# Patient Record
Sex: MALE | Race: BLACK OR AFRICAN AMERICAN | NOT HISPANIC OR LATINO | Employment: OTHER | URBAN - METROPOLITAN AREA
[De-identification: names, ages, dates, MRNs, and addresses within clinical notes are randomized per-mention and may not be internally consistent; named-entity substitution may affect disease eponyms.]

---

## 2019-10-23 ENCOUNTER — HOSPITAL ENCOUNTER (EMERGENCY)
Facility: HOSPITAL | Age: 64
Discharge: HOME | End: 2019-10-23
Attending: EMERGENCY MEDICINE
Payer: MEDICARE

## 2019-10-23 VITALS
BODY MASS INDEX: 21.34 KG/M2 | HEIGHT: 64 IN | OXYGEN SATURATION: 99 % | HEART RATE: 104 BPM | RESPIRATION RATE: 21 BRPM | SYSTOLIC BLOOD PRESSURE: 150 MMHG | WEIGHT: 125 LBS | DIASTOLIC BLOOD PRESSURE: 89 MMHG | TEMPERATURE: 99 F

## 2019-10-23 DIAGNOSIS — R40.4 TRANSIENT ALTERATION OF AWARENESS: ICD-10-CM

## 2019-10-23 DIAGNOSIS — R40.4 ALTERED CONSCIOUSNESS: Primary | ICD-10-CM

## 2019-10-23 LAB
ALBUMIN SERPL BCP-MCNC: 4.1 G/DL (ref 3.5–5.2)
ALBUMIN SERPL BCP-MCNC: 4.1 G/DL (ref 3.5–5.2)
ALP SERPL-CCNC: 95 U/L (ref 55–135)
ALP SERPL-CCNC: 95 U/L (ref 55–135)
ALT SERPL W/O P-5'-P-CCNC: 31 U/L (ref 10–44)
ALT SERPL W/O P-5'-P-CCNC: 31 U/L (ref 10–44)
ANION GAP SERPL CALC-SCNC: 12 MMOL/L (ref 8–16)
ANION GAP SERPL CALC-SCNC: 12 MMOL/L (ref 8–16)
AST SERPL-CCNC: 26 U/L (ref 10–40)
AST SERPL-CCNC: 26 U/L (ref 10–40)
BASOPHILS # BLD AUTO: 0.04 K/UL (ref 0–0.2)
BASOPHILS # BLD AUTO: 0.04 K/UL (ref 0–0.2)
BASOPHILS NFR BLD: 0.4 % (ref 0–1.9)
BASOPHILS NFR BLD: 0.4 % (ref 0–1.9)
BILIRUB SERPL-MCNC: 0.3 MG/DL (ref 0.1–1)
BILIRUB SERPL-MCNC: 0.3 MG/DL (ref 0.1–1)
BILIRUB UR QL STRIP: NEGATIVE
BUN SERPL-MCNC: 12 MG/DL (ref 8–23)
BUN SERPL-MCNC: 12 MG/DL (ref 8–23)
CALCIUM SERPL-MCNC: 10.2 MG/DL (ref 8.7–10.5)
CALCIUM SERPL-MCNC: 10.2 MG/DL (ref 8.7–10.5)
CHLORIDE SERPL-SCNC: 101 MMOL/L (ref 95–110)
CHLORIDE SERPL-SCNC: 101 MMOL/L (ref 95–110)
CHOLEST SERPL-MCNC: 151 MG/DL (ref 120–199)
CHOLEST SERPL-MCNC: 151 MG/DL (ref 120–199)
CHOLEST/HDLC SERPL: 2.3 {RATIO} (ref 2–5)
CHOLEST/HDLC SERPL: 2.3 {RATIO} (ref 2–5)
CLARITY UR: CLEAR
CO2 SERPL-SCNC: 26 MMOL/L (ref 23–29)
CO2 SERPL-SCNC: 26 MMOL/L (ref 23–29)
COLOR UR: YELLOW
CREAT SERPL-MCNC: 1.1 MG/DL (ref 0.5–1.4)
CREAT SERPL-MCNC: 1.2 MG/DL (ref 0.5–1.4)
CREAT SERPL-MCNC: 1.2 MG/DL (ref 0.5–1.4)
DIFFERENTIAL METHOD: ABNORMAL
DIFFERENTIAL METHOD: ABNORMAL
EOSINOPHIL # BLD AUTO: 0.2 K/UL (ref 0–0.5)
EOSINOPHIL # BLD AUTO: 0.2 K/UL (ref 0–0.5)
EOSINOPHIL NFR BLD: 2 % (ref 0–8)
EOSINOPHIL NFR BLD: 2 % (ref 0–8)
ERYTHROCYTE [DISTWIDTH] IN BLOOD BY AUTOMATED COUNT: 14.6 % (ref 11.5–14.5)
ERYTHROCYTE [DISTWIDTH] IN BLOOD BY AUTOMATED COUNT: 14.6 % (ref 11.5–14.5)
EST. GFR  (AFRICAN AMERICAN): >60 ML/MIN/1.73 M^2
EST. GFR  (AFRICAN AMERICAN): >60 ML/MIN/1.73 M^2
EST. GFR  (NON AFRICAN AMERICAN): >60 ML/MIN/1.73 M^2
EST. GFR  (NON AFRICAN AMERICAN): >60 ML/MIN/1.73 M^2
GLUCOSE SERPL-MCNC: 159 MG/DL (ref 70–110)
GLUCOSE SERPL-MCNC: 159 MG/DL (ref 70–110)
GLUCOSE UR QL STRIP: NEGATIVE
HCT VFR BLD AUTO: 40.4 % (ref 40–54)
HCT VFR BLD AUTO: 40.4 % (ref 40–54)
HDLC SERPL-MCNC: 65 MG/DL (ref 40–75)
HDLC SERPL-MCNC: 65 MG/DL (ref 40–75)
HDLC SERPL: 43 % (ref 20–50)
HDLC SERPL: 43 % (ref 20–50)
HGB BLD-MCNC: 12.7 G/DL (ref 14–18)
HGB BLD-MCNC: 12.7 G/DL (ref 14–18)
HGB UR QL STRIP: NEGATIVE
INR PPP: 1 (ref 0.8–1.2)
INR PPP: 1 (ref 0.8–1.2)
KETONES UR QL STRIP: NEGATIVE
LDLC SERPL CALC-MCNC: 74.8 MG/DL (ref 63–159)
LDLC SERPL CALC-MCNC: 74.8 MG/DL (ref 63–159)
LEUKOCYTE ESTERASE UR QL STRIP: NEGATIVE
LYMPHOCYTES # BLD AUTO: 3.2 K/UL (ref 1–4.8)
LYMPHOCYTES # BLD AUTO: 3.2 K/UL (ref 1–4.8)
LYMPHOCYTES NFR BLD: 30 % (ref 18–48)
LYMPHOCYTES NFR BLD: 30 % (ref 18–48)
MCH RBC QN AUTO: 31 PG (ref 27–31)
MCH RBC QN AUTO: 31 PG (ref 27–31)
MCHC RBC AUTO-ENTMCNC: 31.4 G/DL (ref 32–36)
MCHC RBC AUTO-ENTMCNC: 31.4 G/DL (ref 32–36)
MCV RBC AUTO: 99 FL (ref 82–98)
MCV RBC AUTO: 99 FL (ref 82–98)
MONOCYTES # BLD AUTO: 0.8 K/UL (ref 0.3–1)
MONOCYTES # BLD AUTO: 0.8 K/UL (ref 0.3–1)
MONOCYTES NFR BLD: 7 % (ref 4–15)
MONOCYTES NFR BLD: 7 % (ref 4–15)
NEUTROPHILS # BLD AUTO: 6.5 K/UL (ref 1.8–7.7)
NEUTROPHILS # BLD AUTO: 6.5 K/UL (ref 1.8–7.7)
NEUTROPHILS NFR BLD: 60.6 % (ref 38–73)
NEUTROPHILS NFR BLD: 60.6 % (ref 38–73)
NITRITE UR QL STRIP: NEGATIVE
NONHDLC SERPL-MCNC: 86 MG/DL
NONHDLC SERPL-MCNC: 86 MG/DL
PH UR STRIP: 6 [PH] (ref 5–8)
PLATELET # BLD AUTO: 236 K/UL (ref 150–350)
PLATELET # BLD AUTO: 236 K/UL (ref 150–350)
PMV BLD AUTO: 9.6 FL (ref 9.2–12.9)
PMV BLD AUTO: 9.6 FL (ref 9.2–12.9)
POC PTINR: 1 (ref 0.9–1.2)
POC PTWBT: 12.3 SEC (ref 9.7–14.3)
POCT GLUCOSE: 145 MG/DL (ref 70–110)
POTASSIUM SERPL-SCNC: 3.6 MMOL/L (ref 3.5–5.1)
POTASSIUM SERPL-SCNC: 3.6 MMOL/L (ref 3.5–5.1)
PROT SERPL-MCNC: 7.5 G/DL (ref 6–8.4)
PROT SERPL-MCNC: 7.5 G/DL (ref 6–8.4)
PROT UR QL STRIP: NEGATIVE
PROTHROMBIN TIME: 10.3 SEC (ref 9–12.5)
PROTHROMBIN TIME: 10.3 SEC (ref 9–12.5)
RBC # BLD AUTO: 4.1 M/UL (ref 4.6–6.2)
RBC # BLD AUTO: 4.1 M/UL (ref 4.6–6.2)
SAMPLE: NORMAL
SAMPLE: NORMAL
SODIUM SERPL-SCNC: 139 MMOL/L (ref 136–145)
SODIUM SERPL-SCNC: 139 MMOL/L (ref 136–145)
SP GR UR STRIP: 1.01 (ref 1–1.03)
TRIGL SERPL-MCNC: 56 MG/DL (ref 30–150)
TRIGL SERPL-MCNC: 56 MG/DL (ref 30–150)
TROPONIN I SERPL DL<=0.01 NG/ML-MCNC: 0.01 NG/ML (ref 0–0.03)
TSH SERPL DL<=0.005 MIU/L-ACNC: 0.74 UIU/ML (ref 0.4–4)
TSH SERPL DL<=0.005 MIU/L-ACNC: 0.74 UIU/ML (ref 0.4–4)
URN SPEC COLLECT METH UR: NORMAL
UROBILINOGEN UR STRIP-ACNC: NEGATIVE EU/DL
WBC # BLD AUTO: 10.69 K/UL (ref 3.9–12.7)
WBC # BLD AUTO: 10.69 K/UL (ref 3.9–12.7)

## 2019-10-23 PROCEDURE — 80053 COMPREHEN METABOLIC PANEL: CPT

## 2019-10-23 PROCEDURE — 63600175 PHARM REV CODE 636 W HCPCS: Performed by: EMERGENCY MEDICINE

## 2019-10-23 PROCEDURE — 99285 EMERGENCY DEPT VISIT HI MDM: CPT | Mod: 25

## 2019-10-23 PROCEDURE — 93005 ELECTROCARDIOGRAM TRACING: CPT

## 2019-10-23 PROCEDURE — 99900035 HC TECH TIME PER 15 MIN (STAT)

## 2019-10-23 PROCEDURE — 93010 ELECTROCARDIOGRAM REPORT: CPT | Mod: ,,, | Performed by: INTERNAL MEDICINE

## 2019-10-23 PROCEDURE — 85610 PROTHROMBIN TIME: CPT | Mod: 91

## 2019-10-23 PROCEDURE — G0425 PR INPT TELEHEALTH CONSULT 30M: ICD-10-PCS | Mod: GT,,, | Performed by: PSYCHIATRY & NEUROLOGY

## 2019-10-23 PROCEDURE — 96360 HYDRATION IV INFUSION INIT: CPT | Mod: 59

## 2019-10-23 PROCEDURE — 82565 ASSAY OF CREATININE: CPT | Mod: 91

## 2019-10-23 PROCEDURE — 81003 URINALYSIS AUTO W/O SCOPE: CPT

## 2019-10-23 PROCEDURE — G0425 INPT/ED TELECONSULT30: HCPCS | Mod: GT,,, | Performed by: PSYCHIATRY & NEUROLOGY

## 2019-10-23 PROCEDURE — 96361 HYDRATE IV INFUSION ADD-ON: CPT

## 2019-10-23 PROCEDURE — 93010 EKG 12-LEAD: ICD-10-PCS | Mod: ,,, | Performed by: INTERNAL MEDICINE

## 2019-10-23 PROCEDURE — 96372 THER/PROPH/DIAG INJ SC/IM: CPT | Mod: 59

## 2019-10-23 PROCEDURE — 84484 ASSAY OF TROPONIN QUANT: CPT

## 2019-10-23 PROCEDURE — 85025 COMPLETE CBC W/AUTO DIFF WBC: CPT

## 2019-10-23 PROCEDURE — 25500020 PHARM REV CODE 255: Performed by: EMERGENCY MEDICINE

## 2019-10-23 PROCEDURE — 80061 LIPID PANEL: CPT

## 2019-10-23 PROCEDURE — 85610 PROTHROMBIN TIME: CPT

## 2019-10-23 PROCEDURE — 84443 ASSAY THYROID STIM HORMONE: CPT

## 2019-10-23 RX ORDER — LISINOPRIL 20 MG/1
20 TABLET ORAL DAILY
COMMUNITY
End: 2023-01-01 | Stop reason: ALTCHOICE

## 2019-10-23 RX ORDER — ATORVASTATIN CALCIUM 40 MG/1
40 TABLET, FILM COATED ORAL NIGHTLY
COMMUNITY
End: 2023-01-01 | Stop reason: ALTCHOICE

## 2019-10-23 RX ORDER — TRAZODONE HYDROCHLORIDE 100 MG/1
100 TABLET ORAL NIGHTLY
COMMUNITY
End: 2023-01-01 | Stop reason: ALTCHOICE

## 2019-10-23 RX ORDER — MULTIVITAMIN
1 TABLET ORAL DAILY
COMMUNITY

## 2019-10-23 RX ORDER — OLANZAPINE 10 MG/1
10 TABLET ORAL 2 TIMES DAILY
COMMUNITY
End: 2023-01-01 | Stop reason: ALTCHOICE

## 2019-10-23 RX ORDER — ZIPRASIDONE MESYLATE 20 MG/ML
10 INJECTION, POWDER, LYOPHILIZED, FOR SOLUTION INTRAMUSCULAR
Status: COMPLETED | OUTPATIENT
Start: 2019-10-23 | End: 2019-10-23

## 2019-10-23 RX ORDER — SODIUM CHLORIDE 9 MG/ML
1000 INJECTION, SOLUTION INTRAVENOUS
Status: COMPLETED | OUTPATIENT
Start: 2019-10-23 | End: 2019-10-23

## 2019-10-23 RX ORDER — ZIPRASIDONE MESYLATE 20 MG/ML
10 INJECTION, POWDER, LYOPHILIZED, FOR SOLUTION INTRAMUSCULAR EVERY 12 HOURS PRN
COMMUNITY
End: 2023-01-01 | Stop reason: ALTCHOICE

## 2019-10-23 RX ORDER — BUSPIRONE HYDROCHLORIDE 5 MG/1
10 TABLET ORAL 3 TIMES DAILY
COMMUNITY
End: 2023-01-01 | Stop reason: ALTCHOICE

## 2019-10-23 RX ADMIN — ZIPRASIDONE MESYLATE 10 MG: 20 INJECTION, POWDER, LYOPHILIZED, FOR SOLUTION INTRAMUSCULAR at 10:10

## 2019-10-23 RX ADMIN — SODIUM CHLORIDE 1000 ML: 0.9 INJECTION, SOLUTION INTRAVENOUS at 11:10

## 2019-10-23 RX ADMIN — IOHEXOL 100 ML: 350 INJECTION, SOLUTION INTRAVENOUS at 11:10

## 2019-10-23 NOTE — ED NOTES
Pt is awake, alert, speaking, answering questing somewhat appropriately, and asking for food. Dr. Lefort informed.

## 2019-10-23 NOTE — ED NOTES
Spoke with nurse Radha from [facility]. Reports pt ambulates independently and does not require WC transportation. This RN ambulated with pt. Pt ambulated independently with no assistance and steady gait.

## 2019-10-23 NOTE — CONSULTS
[facility]  Vascular Neurology  Comprehensive Stroke Center  Tele-Consultation Note      Consults    Consulting Provider: LEFORT, GUY J.  Current Providers  No providers found    Patient Location:  [facility] EMERGENCY DEPARTMENT Emergency Department  Spoke hospital nurse at bedside with patient assisting consultant.     Patient information was obtained from relative(s), EMS personnel and ED staff.         Assessment/Plan:   63 y/o with HTN, HLD, PTSD, schizophrenia, brought in due altered mental status and L face droop.  NIHSS 26, CTH without acute abnormality.  Suspect encephalopathy, thus will not treat with iv alteplase.  Recommend STAT CTA head and neck.  Admit. MRI brain. Neurology consult.          STROKE DOCUMENTATION     Acute Stroke Times:   Acute Stroke Times   Last Known Normal Date: 10/23/19  Last Known Normal Time: 0800  Symptom Onset Date: 10/23/19  Symptom Onset Time: 0800  Stroke Team Called Date: 10/23/19  Stroke Team Called Time: 1003  Stroke Team Arrival Date: 10/23/19  Stroke Team Arrival Time: 1006  CT Interpretation Time: 1006  Decision to Treat Time for Alteplase: (No IV alteplase)  Decision to Treat Time for IR: (No IR candidate)    NIH Scale:  Interval: baseline  1a. Level of Consciousness: 0-->Alert, keenly responsive  1b. LOC Questions: 2-->Answers neither question correctly  1c. LOC Commands: 2-->Performs neither task correctly  2. Best Gaze: 0-->Normal  3. Visual: 0-->No visual loss  4. Facial Palsy: 1-->Minor paralysis (flattened nasolabial fold, asymmetry on smiling)  5a. Motor Arm, Left: 4-->No movement  5b. Motor Arm, Right: 4-->No movement  6a. Motor Leg, Left: 4-->No movement  6b. Motor Leg, Right: 4-->No movement  7. Limb Ataxia: 0-->Absent  8. Sensory: 0-->Normal, no sensory loss  9. Best Language: 3-->Mute, global aphasia, no usable speech or auditory comprehension  10. Dysarthria: 2-->Severe dysarthria, patients speech is so slurred as to be  "unintelligible in the absence of or out of proportion to any dysphasia, or is mute/anarthric  11. Extinction and Inattention (formerly Neglect): 0-->No abnormality  Total (NIH Stroke Scale): 26     Modified Isabela Score: 1  [place] Coma Scale:3   ABCD2 Score:    [id number] Score:   HAS -BLED Score:   ICH Score:   Hunt & Santiago Classification:       Diagnoses: altered mental status.  No new Assessment & Plan notes have been filed under this hospital service since the last note was generated.  Service: Vascular Neurology      Temperature 98.4 °F (36.9 °C), temperature source Oral, height 5' 4" (1.626 m), weight 56.7 kg (125 lb).  Alteplase Eligible?: No  Alteplase Recommendation: Alteplase not recommended due to Suspected stroke mimic   Possible Interventional Revascularization Candidate? suspected encephalopathy    Disposition Recommendation: admit to inpatient    Subjective:     History of Present Illness: 65 y/o with HTN, HLD, PTSD, schizophrenia, brought in due altered mental status and L face droop.      No notes on file      Woke up with symptoms?: no    Recent bleeding noted: unknown  Does the patient take any Blood Thinners? no  Medications: Antiplatelets:  none and Anticoagulants:  none      Past Medical History: hypertension, hyperlipidemia and PTSD, schizophrenia    Past Surgical History: unable to obtain    Family History: no relevant history    Social History: unable to obtain    Allergies: No Known Allergies No known drug allergies    Review of Systems   Unable to perform ROS: Patient unresponsive     Objective:   Vitals: Height 5' 4" (1.626 m), weight 56.7 kg (125 lb). BP: 191/86, Respiratory Rate: 17 and Heart Rate: 78    CT READ: Yes  No hemmorhage. No mass effect. No early infarct signs.     Physical Exam   Constitutional: He appears well-developed and well-nourished.   HENT:   Head: Normocephalic and atraumatic.   Eyes: Pupils are equal, round, and reactive to light. EOM are normal.   Neck: Normal " range of motion. Neck supple.   Cardiovascular: Regular rhythm.   Pulmonary/Chest: No respiratory distress.   Abdominal: He exhibits no mass.   Genitourinary:   Genitourinary Comments: No performed   Musculoskeletal: He exhibits no edema or deformity.   Neurological: A cranial nerve deficit is present.   Open eyes, unresponsive, no spontaneous movements   Skin: No rash noted. He is not diaphoretic. No erythema.   Psychiatric:   Unable to test     Nursing note and vitals reviewed.            Recommended the emergency room physician to have a brief discussion with the patient and/or family if available regarding the risks and benefits of treatment, and to briefly document the occurrence of that discussion in his clinical encounter note.     The attending portion of this evaluation, treatment, and documentation was performed per Power Juárez MD via audiovisual.    Billing code:  (non-intervention mild to moderate stroke, TIA, some mimics)    · This patient has a critical neurological condition/illness, with some potential for high morbidity and mortality.  · There is a moderate probability for acute neurological change leading to clinical and possibly life-threatening deterioration requiring highest level of physician preparedness for urgent intervention.  · Care was coordinated with other physicians involved in the patient's care.  · Radiologic studies and laboratory data were reviewed and interpreted, and plan of care was re-assessed based on the results.  · Diagnosis, treatment options and prognosis may have been discussed with the patient and/or family members or caregiver.      In your opinion, this was a: Tier 1 N/A    Consult End Time: 1022 am    Power Juárez MD  Comprehensive Stroke Center  Vascular Neurology   [facility]

## 2019-10-23 NOTE — ED NOTES
Pt removing cardiac monitor and blood pressure cuff. Spoke with nurse Radha from [facility] pt did not receive daily medication prior to departure of facility. Dr. Lefort informed. Orders to be placed.

## 2019-10-23 NOTE — SUBJECTIVE & OBJECTIVE
"  Woke up with symptoms?: no    Recent bleeding noted: unknown  Does the patient take any Blood Thinners? no  Medications: Antiplatelets:  none and Anticoagulants:  none      Past Medical History: hypertension, hyperlipidemia and PTSD, schizophrenia    Past Surgical History: unable to obtain    Family History: no relevant history    Social History: unable to obtain    Allergies: No Known Allergies No known drug allergies    Review of Systems   Unable to perform ROS: Patient unresponsive     Objective:   Vitals: Height 5' 4" (1.626 m), weight 56.7 kg (125 lb). BP: 191/86, Respiratory Rate: 17 and Heart Rate: 78    CT READ: Yes  No hemmorhage. No mass effect. No early infarct signs.     Physical Exam   Constitutional: He appears well-developed and well-nourished.   HENT:   Head: Normocephalic and atraumatic.   Eyes: Pupils are equal, round, and reactive to light. EOM are normal.   Neck: Normal range of motion. Neck supple.   Cardiovascular: Regular rhythm.   Pulmonary/Chest: No respiratory distress.   Abdominal: He exhibits no mass.   Genitourinary:   Genitourinary Comments: No performed   Musculoskeletal: He exhibits no edema or deformity.   Neurological: A cranial nerve deficit is present.   Open eyes, unresponsive, no spontaneous movements   Skin: No rash noted. He is not diaphoretic. No erythema.   Psychiatric:   Unable to test     Nursing note and vitals reviewed.        "

## 2019-10-23 NOTE — ED NOTES
SPD arrived for transportation. Informed pt is CEC'd and a flight risk. Security x 2 at bedside. Pt escorted in WC to secured vehicle.

## 2019-10-23 NOTE — ED PROVIDER NOTES
Encounter Date: 10/23/2019    SCRIBE #1 NOTE: I, Christina Noguera , am scribing for, and in the presence of,  Dr. Lefort . I have scribed the entire note.       History     Chief Complaint   Patient presents with    Facial Droop     LKW 0930. Acute AMS with L side facial droop. Sent from [facility] with PEC in place.      Time seen by provider: 9: 53 AM    This is a 64 y.o. male who presents with complaint of left-sided facial droop that started PTA this morning. EMS reports the pt is currently PEC'd at [facility]. Per care staff, pt was last known well at 9:30 AM this morning. Staff notes the pt exhibits sudden onset confusion.    The history is provided by the EMS personnel. The history is limited by the condition of the patient (pt is unreponsive ).     Review of patient's allergies indicates:  No Known Allergies  Past Medical History:   Diagnosis Date    Hyperlipidemia     Hypertension     PTSD (post-traumatic stress disorder)     Schizophrenia      No past surgical history on file.  No family history on file.  Social History     Tobacco Use    Smoking status: Current Every Day Smoker   Substance Use Topics    Alcohol use: Not Currently     Frequency: Never    Drug use: Not Currently     Review of Systems   Unable to perform ROS: Patient unresponsive       Physical Exam     Initial Vitals   BP Pulse Resp Temp SpO2   10/23/19 1013 10/23/19 1013 10/23/19 1013 10/23/19 1020 10/23/19 1016   (!) 191/86 (!) 130 (!) 24 98.4 °F (36.9 °C) 100 %      MAP       --                Physical Exam    Nursing note and vitals reviewed.  Constitutional: He appears well-developed and well-nourished. He is not diaphoretic. No distress.   Largely non-verbal    HENT:   Head: Normocephalic and atraumatic.   Nose: Nose normal.   Eyes: Conjunctivae and EOM are normal.   Neck: Normal range of motion. Neck supple.   Cardiovascular: Regular rhythm.   No murmur heard.  Tachycardic   Pulmonary/Chest: Breath sounds normal. No  respiratory distress. He has no wheezes.   Abdominal: Soft. He exhibits no distension. There is no tenderness.   Musculoskeletal:   Moves all extremities without difficulty   Neurological: A cranial nerve deficit is present.   subtle left facial weakness when smiling   Skin: Skin is warm and dry. No rash noted.         ED Course   Critical Care  Date/Time: 10/23/2019 10:43 AM  Performed by: Guy J. Lefort, MD  Authorized by: Guy J. Lefort, MD   Total critical care time (exclusive of procedural time) : 30 minutes  Critical care was necessary to treat or prevent imminent or life-threatening deterioration of the following conditions: CNS failure or compromise.  Critical care was time spent personally by me on the following activities: development of treatment plan with patient or surrogate, discussions with consultants, interpretation of cardiac output measurements, evaluation of patient's response to treatment, examination of patient, obtaining history from patient or surrogate, ordering and performing treatments and interventions, ordering and review of laboratory studies, ordering and review of radiographic studies, review of old charts and re-evaluation of patient's condition.        Labs Reviewed   CBC W/ AUTO DIFFERENTIAL - Abnormal; Notable for the following components:       Result Value    RBC 4.10 (*)     Hemoglobin 12.7 (*)     Mean Corpuscular Volume 99 (*)     Mean Corpuscular Hemoglobin Conc 31.4 (*)     RDW 14.6 (*)     All other components within normal limits   COMPREHENSIVE METABOLIC PANEL - Abnormal; Notable for the following components:    Glucose 159 (*)     All other components within normal limits   CBC W/ AUTO DIFFERENTIAL - Abnormal; Notable for the following components:    RBC 4.10 (*)     Hemoglobin 12.7 (*)     Mean Corpuscular Volume 99 (*)     Mean Corpuscular Hemoglobin Conc 31.4 (*)     RDW 14.6 (*)     All other components within normal limits   COMPREHENSIVE METABOLIC PANEL - Abnormal;  Notable for the following components:    Glucose 159 (*)     All other components within normal limits   POCT GLUCOSE - Abnormal; Notable for the following components:    POCT Glucose 145 (*)     All other components within normal limits   PROTIME-INR   TSH   LIPID PANEL   PROTIME-INR   TSH   LIPID PANEL   TROPONIN I   URINALYSIS, REFLEX TO URINE CULTURE    Narrative:     Preferred Collection Type->Urine, Clean Catch   ISTAT PROCEDURE   ISTAT CREATININE     EKG Readings: (Independently Interpreted)   Sinus Tachycardia; rate of 130 bpm; no ectopy; no STEMI        Imaging Results          CTA Head and Neck (xpd) (Final result)  Result time 10/23/19 13:09:52   Procedure changed from CTA Brain     Final result by Gaurav Thomas MD (10/23/19 13:09:52)                 Impression:      Bilateral carotid bifurcation and proximal internal carotid artery soft and calcified atherosclerosis, more prominent on the left.    Otherwise, unremarkable MRA head and neck.    No acute intracranial abnormality.      Electronically signed by: Gaurav Thomas MD  Date:    10/23/2019  Time:    13:09             Narrative:    EXAMINATION:  CTA HEAD AND NECK (XPD)    CLINICAL HISTORY:  Stroke;    TECHNIQUE:  Non contrast low dose axial images were obtained through the head.  CT angiogram was performed from the level of the asiya to the top of the head following the IV administration of 100mL of Omnipaque 350.   Sagittal and coronal reconstructions and maximum intensity projection reconstructions were performed. Arterial stenosis percentages are based on NASCET measurement criteria.    COMPARISON:  CT head October 23, 2019    FINDINGS:  No intracranial hemorrhage or acute infarction.  No intracranial mass or mass effect.  No hydrocephalus.    No abnormal intracranial enhancement on the postcontrast images.    Limited images of the upper chest demonstrate bilateral centrilobular emphysema.    No acute bone abnormality.    CTA neck:    The  left carotid bifurcation and proximal left internal carotid artery demonstrate soft and calcified atherosclerosis with approximately 50% stenosis.    The right carotid bifurcation and proximal right internal carotid artery demonstrate soft and calcified atherosclerosis with less than 50% stenosis.    The bilateral vertebral arteries are patent without significant flow-limiting stenosis.    CTA head:    No significant flow-limiting stenosis in the intracranial arteries.  No intracranial aneurysm or vascular malformation.                               X-Ray Chest AP Portable (Final result)  Result time 10/23/19 10:53:45    Final result by Bjorn Suarez MD (10/23/19 10:53:45)                 Impression:      As above.      Electronically signed by: Bjorn Suarez  Date:    10/23/2019  Time:    10:53             Narrative:    EXAMINATION:  XR CHEST AP PORTABLE    CLINICAL HISTORY:  Stroke;    TECHNIQUE:  Single frontal view of the chest was performed.    COMPARISON:  None available    FINDINGS:  Increased pulmonary interstitial attenuation.  No lobar consolidation, pleural effusion, or pneumothorax.  Cardiomediastinal silhouette is within normal limits.  No acute osseous abnormality.  Metallic clips noted along the right neck.                               CT Head Without Contrast (Final result)  Result time 10/23/19 10:36:30    Final result by Bjorn Suarez MD (10/23/19 10:36:30)                 Impression:      No acute intracranial abnormality.    Prominent right subinsular CSF space versus remote lacune.      Electronically signed by: Bjorn Suarez  Date:    10/23/2019  Time:    10:36             Narrative:    EXAMINATION:  CT HEAD WITHOUT CONTRAST    CLINICAL HISTORY:  Focal neuro deficit, new, fixed or worsening, <6 hours;    TECHNIQUE:  Low dose axial CT images obtained throughout the head without intravenous contrast. Sagittal and coronal reconstructions were performed.    COMPARISON:  None  available    FINDINGS:  Hypoattenuating 6 mm right subinsular focus, possible remote lacune versus prominent CSF space (series 400, image 60).  Otherwise, gray-white differentiation appears maintained.  No midline shift, mass effect or extra-axial collection.  No abnormal ventricular enlargement.  Paranasal sinuses and bilateral mastoid air cells are clear.  No acute calvarial abnormality.                                 Medical Decision Making:   Differential Diagnosis:   Differential Diagnosis includes, but is not limited to:  CVA/TIA, intracranial mass/hemorrhage, head trauma, seizure, status epilepticus, post-ictal state, meningitis/encephalitis, sepsis, MI/ACS, arrhythmia, syncope,   anaphylaxis, thyroid disease, neuroleptic malignant syndrome, serotonin syndrome, CO poisoning, hypoxia/hypercapnea, uremic/hepatic encephalopathy, medication reaction, intentional overdose, metabolic derangement, psychiatric disturbance, substance abuse, alcohol intoxication/withdrawal, hypoglycemia/hyperglycemia, complicated migraine.    Independently Interpreted Test(s):   I have ordered and independently interpreted X-rays - see prior notes.  I have ordered and independently interpreted EKG Reading(s) - see prior notes  Clinical Tests:   Lab Tests: Ordered and Reviewed  Radiological Study: Ordered and Reviewed  Medical Tests: Ordered and Reviewed                   ED Course as of Oct 23 1347   Wed Oct 23, 2019   1010 Spoke to Dr. Simon (neurology) who states the patient is not a tPA candidate. He recommends CTA of the head and neck.     [AJ]   1333 Patient received CTA head and neck which was negative. Discussed with Dr. Kent who recommends PT/OT at [facility] and discharge of patient in stable condition.    [AJ]      ED Course User Index  [AJ] Christina Noguera     Clinical Impression:       ICD-10-CM ICD-9-CM   1. Altered consciousness R40.4 780.09   2. Transient alteration of awareness R40.4 780.02     Scribe Attestation I,  Dr. Guy Lefort, personally performed the services described in this documentation. All medical record entries made by the scribe were at my direction and in my presence. I have reviewed the chart and agree that the record reflects my personal performance and is accurate and complete. Guy Lefort, MD.  2:42 AM 10/25/2019      Disposition:   Disposition: Discharged  Condition: Stable                        Guy J. Lefort, MD  10/25/19 0243

## 2021-10-27 ENCOUNTER — HOSPITAL ENCOUNTER (OUTPATIENT)
Dept: MEDSURG UNIT | Facility: HOSPITAL | Age: 66
End: 2021-10-29
Attending: INTERNAL MEDICINE | Admitting: INTERNAL MEDICINE

## 2021-10-27 LAB
ABS NEUT (OLG): 6.66 X10(3)/MCL (ref 2.1–9.2)
ABS NEUT (OLG): 7.03 X10(3)/MCL (ref 2.1–9.2)
ALBUMIN SERPL-MCNC: 3.3 GM/DL (ref 3.4–4.8)
ALBUMIN SERPL-MCNC: 3.5 GM/DL (ref 3.4–4.8)
ALBUMIN/GLOB SERPL: 1 RATIO (ref 1.1–2)
ALBUMIN/GLOB SERPL: 1 RATIO (ref 1.1–2)
ALP SERPL-CCNC: 73 UNIT/L (ref 40–150)
ALP SERPL-CCNC: 83 UNIT/L (ref 40–150)
ALT SERPL-CCNC: 14 UNIT/L (ref 0–55)
ALT SERPL-CCNC: 16 UNIT/L (ref 0–55)
APPEARANCE, UA: CLEAR
AST SERPL-CCNC: 38 UNIT/L (ref 5–34)
AST SERPL-CCNC: 41 UNIT/L (ref 5–34)
BACTERIA #/AREA URNS AUTO: ABNORMAL /HPF
BASOPHILS # BLD AUTO: 0 X10(3)/MCL (ref 0–0.2)
BASOPHILS # BLD AUTO: 0.1 X10(3)/MCL (ref 0–0.2)
BASOPHILS NFR BLD AUTO: 0 %
BASOPHILS NFR BLD AUTO: 0 %
BILIRUB SERPL-MCNC: 0.3 MG/DL
BILIRUB SERPL-MCNC: 0.4 MG/DL
BILIRUB UR QL STRIP: NEGATIVE
BILIRUBIN DIRECT+TOT PNL SERPL-MCNC: 0.1 MG/DL (ref 0–0.8)
BILIRUBIN DIRECT+TOT PNL SERPL-MCNC: 0.2 MG/DL (ref 0–0.5)
BILIRUBIN DIRECT+TOT PNL SERPL-MCNC: 0.2 MG/DL (ref 0–0.5)
BILIRUBIN DIRECT+TOT PNL SERPL-MCNC: 0.2 MG/DL (ref 0–0.8)
BUN SERPL-MCNC: 11.5 MG/DL (ref 8.4–25.7)
BUN SERPL-MCNC: 14.3 MG/DL (ref 8.4–25.7)
CALCIUM SERPL-MCNC: 10 MG/DL (ref 8.7–10.5)
CALCIUM SERPL-MCNC: 9.7 MG/DL (ref 8.7–10.5)
CHLORIDE SERPL-SCNC: 104 MMOL/L (ref 98–107)
CHLORIDE SERPL-SCNC: 104 MMOL/L (ref 98–107)
CO2 SERPL-SCNC: 24 MMOL/L (ref 23–31)
CO2 SERPL-SCNC: 26 MMOL/L (ref 23–31)
COLOR UR: ABNORMAL
CREAT SERPL-MCNC: 0.99 MG/DL (ref 0.73–1.18)
CREAT SERPL-MCNC: 1.09 MG/DL (ref 0.73–1.18)
EOSINOPHIL # BLD AUTO: 0.2 X10(3)/MCL (ref 0–0.9)
EOSINOPHIL # BLD AUTO: 0.3 X10(3)/MCL (ref 0–0.9)
EOSINOPHIL NFR BLD AUTO: 2 %
EOSINOPHIL NFR BLD AUTO: 2 %
ERYTHROCYTE [DISTWIDTH] IN BLOOD BY AUTOMATED COUNT: 13.8 % (ref 11.5–14.5)
ERYTHROCYTE [DISTWIDTH] IN BLOOD BY AUTOMATED COUNT: 13.9 % (ref 11.5–14.5)
GLOBULIN SER-MCNC: 3.2 GM/DL (ref 2.4–3.5)
GLOBULIN SER-MCNC: 3.4 GM/DL (ref 2.4–3.5)
GLUCOSE (UA): NEGATIVE
GLUCOSE SERPL-MCNC: 83 MG/DL (ref 82–115)
GLUCOSE SERPL-MCNC: 97 MG/DL (ref 82–115)
HCT VFR BLD AUTO: 36 % (ref 40–51)
HCT VFR BLD AUTO: 37.5 % (ref 40–51)
HGB BLD-MCNC: 11.9 GM/DL (ref 13.5–17.5)
HGB BLD-MCNC: 12.9 GM/DL (ref 13.5–17.5)
HGB UR QL STRIP: 0.5
HYALINE CASTS #/AREA URNS LPF: ABNORMAL /LPF
IMM GRANULOCYTES # BLD AUTO: 0.03 10*3/UL
IMM GRANULOCYTES # BLD AUTO: 0.03 10*3/UL
IMM GRANULOCYTES NFR BLD AUTO: 0 %
IMM GRANULOCYTES NFR BLD AUTO: 0 %
INR PPP: 1.07 (ref 0.9–1.2)
KETONES UR QL STRIP: NEGATIVE
LEUKOCYTE ESTERASE UR QL STRIP: 250 LEU/UL
LYMPHOCYTES # BLD AUTO: 3.3 X10(3)/MCL (ref 0.6–4.6)
LYMPHOCYTES # BLD AUTO: 3.4 X10(3)/MCL (ref 0.6–4.6)
LYMPHOCYTES NFR BLD AUTO: 29 %
LYMPHOCYTES NFR BLD AUTO: 30 %
MAGNESIUM SERPL-MCNC: 1.9 MG/DL (ref 1.6–2.6)
MCH RBC QN AUTO: 30.9 PG (ref 26–34)
MCH RBC QN AUTO: 31.3 PG (ref 26–34)
MCHC RBC AUTO-ENTMCNC: 33.1 GM/DL (ref 31–37)
MCHC RBC AUTO-ENTMCNC: 34.4 GM/DL (ref 31–37)
MCV RBC AUTO: 91 FL (ref 80–100)
MCV RBC AUTO: 93.5 FL (ref 80–100)
MONOCYTES # BLD AUTO: 0.8 X10(3)/MCL (ref 0.1–1.3)
MONOCYTES # BLD AUTO: 0.8 X10(3)/MCL (ref 0.1–1.3)
MONOCYTES NFR BLD AUTO: 7 %
MONOCYTES NFR BLD AUTO: 7 %
NEUTROPHILS # BLD AUTO: 6.66 X10(3)/MCL (ref 2.1–9.2)
NEUTROPHILS # BLD AUTO: 7.03 X10(3)/MCL (ref 2.1–9.2)
NEUTROPHILS NFR BLD AUTO: 60 %
NEUTROPHILS NFR BLD AUTO: 61 %
NITRITE UR QL STRIP: NEGATIVE
NRBC BLD AUTO-RTO: 0 % (ref 0–0.2)
NRBC BLD AUTO-RTO: 0 % (ref 0–0.2)
PH UR STRIP: 5.5 [PH] (ref 4.5–8)
PLATELET # BLD AUTO: 195 X10(3)/MCL (ref 130–400)
PLATELET # BLD AUTO: 214 X10(3)/MCL (ref 130–400)
PMV BLD AUTO: 9.5 FL (ref 7.4–10.4)
PMV BLD AUTO: 9.8 FL (ref 7.4–10.4)
POTASSIUM SERPL-SCNC: 4.2 MMOL/L (ref 3.5–5.1)
POTASSIUM SERPL-SCNC: 4.2 MMOL/L (ref 3.5–5.1)
PROT SERPL-MCNC: 6.5 GM/DL (ref 5.8–7.6)
PROT SERPL-MCNC: 6.9 GM/DL (ref 5.8–7.6)
PROT UR QL STRIP: NEGATIVE
PROTHROMBIN TIME: 13.7 SECOND(S) (ref 11.9–14.4)
RBC # BLD AUTO: 3.85 X10(6)/MCL (ref 4.5–5.9)
RBC # BLD AUTO: 4.12 X10(6)/MCL (ref 4.5–5.9)
RBC #/AREA URNS AUTO: ABNORMAL /HPF
SARS-COV-2 AG RESP QL IA.RAPID: NEGATIVE
SODIUM SERPL-SCNC: 136 MMOL/L (ref 136–145)
SODIUM SERPL-SCNC: 140 MMOL/L (ref 136–145)
SP GR UR STRIP: 1 (ref 1–1.03)
SQUAMOUS #/AREA URNS LPF: ABNORMAL /LPF
TROPONIN I SERPL-MCNC: 0.02 NG/ML (ref 0–0.04)
UROBILINOGEN UR STRIP-ACNC: NORMAL
WBC # SPEC AUTO: 11.1 X10(3)/MCL (ref 4.5–11)
WBC # SPEC AUTO: 11.5 X10(3)/MCL (ref 4.5–11)
WBC #/AREA URNS AUTO: ABNORMAL /HPF

## 2021-10-28 LAB
ABS NEUT (OLG): 5.62 X10(3)/MCL (ref 2.1–9.2)
ALBUMIN SERPL-MCNC: 3.4 GM/DL (ref 3.4–4.8)
ALBUMIN/GLOB SERPL: 1 RATIO (ref 1.1–2)
ALP SERPL-CCNC: 76 UNIT/L (ref 40–150)
ALT SERPL-CCNC: 18 UNIT/L (ref 0–55)
AST SERPL-CCNC: 38 UNIT/L (ref 5–34)
BASOPHILS # BLD AUTO: 0.1 X10(3)/MCL (ref 0–0.2)
BASOPHILS NFR BLD AUTO: 1 %
BILIRUB SERPL-MCNC: 0.6 MG/DL
BILIRUBIN DIRECT+TOT PNL SERPL-MCNC: 0.3 MG/DL (ref 0–0.5)
BILIRUBIN DIRECT+TOT PNL SERPL-MCNC: 0.3 MG/DL (ref 0–0.8)
BUN SERPL-MCNC: 9.6 MG/DL (ref 8.4–25.7)
CALCIUM SERPL-MCNC: 9.4 MG/DL (ref 8.7–10.5)
CHLORIDE SERPL-SCNC: 103 MMOL/L (ref 98–107)
CO2 SERPL-SCNC: 26 MMOL/L (ref 23–31)
CREAT SERPL-MCNC: 0.98 MG/DL (ref 0.73–1.18)
EOSINOPHIL # BLD AUTO: 0.4 X10(3)/MCL (ref 0–0.9)
EOSINOPHIL NFR BLD AUTO: 4 %
ERYTHROCYTE [DISTWIDTH] IN BLOOD BY AUTOMATED COUNT: 14.1 % (ref 11.5–14.5)
GLOBULIN SER-MCNC: 3.4 GM/DL (ref 2.4–3.5)
GLUCOSE SERPL-MCNC: 107 MG/DL (ref 82–115)
HAV IGM SERPL QL IA: NONREACTIVE
HBV CORE IGM SERPL QL IA: NONREACTIVE
HBV SURFACE AG SERPL QL IA: NONREACTIVE
HCT VFR BLD AUTO: 38.1 % (ref 40–51)
HCV AB SERPL QL IA: NONREACTIVE
HGB BLD-MCNC: 12.6 GM/DL (ref 13.5–17.5)
HIV 1+2 AB+HIV1 P24 AG SERPL QL IA: NONREACTIVE
IMM GRANULOCYTES # BLD AUTO: 0.03 10*3/UL
IMM GRANULOCYTES NFR BLD AUTO: 0 %
LYMPHOCYTES # BLD AUTO: 3.3 X10(3)/MCL (ref 0.6–4.6)
LYMPHOCYTES NFR BLD AUTO: 33 %
MCH RBC QN AUTO: 30.9 PG (ref 26–34)
MCHC RBC AUTO-ENTMCNC: 33.1 GM/DL (ref 31–37)
MCV RBC AUTO: 93.4 FL (ref 80–100)
MONOCYTES # BLD AUTO: 0.6 X10(3)/MCL (ref 0.1–1.3)
MONOCYTES NFR BLD AUTO: 6 %
NEUTROPHILS # BLD AUTO: 5.62 X10(3)/MCL (ref 2.1–9.2)
NEUTROPHILS NFR BLD AUTO: 56 %
NRBC BLD AUTO-RTO: 0 % (ref 0–0.2)
PLATELET # BLD AUTO: 211 X10(3)/MCL (ref 130–400)
PMV BLD AUTO: 9.6 FL (ref 7.4–10.4)
POTASSIUM SERPL-SCNC: 4.1 MMOL/L (ref 3.5–5.1)
PROT SERPL-MCNC: 6.8 GM/DL (ref 5.8–7.6)
RBC # BLD AUTO: 4.08 X10(6)/MCL (ref 4.5–5.9)
RPR SER QL: REACTIVE
SODIUM SERPL-SCNC: 139 MMOL/L (ref 136–145)
T PALLIDUM AB SER QL: REACTIVE
T4 FREE SERPL-MCNC: 1.3 NG/DL (ref 0.7–1.48)
TSH SERPL-ACNC: 1.21 UIU/ML (ref 0.35–4.94)
WBC # SPEC AUTO: 10 X10(3)/MCL (ref 4.5–11)

## 2021-10-29 LAB
ABS NEUT (OLG): 3.77 X10(3)/MCL (ref 2.1–9.2)
ALBUMIN SERPL-MCNC: 2.9 GM/DL (ref 3.4–4.8)
ALBUMIN/GLOB SERPL: 1 RATIO (ref 1.1–2)
ALP SERPL-CCNC: 60 UNIT/L (ref 40–150)
ALT SERPL-CCNC: 13 UNIT/L (ref 0–55)
AST SERPL-CCNC: 30 UNIT/L (ref 5–34)
BASOPHILS # BLD AUTO: 0.1 X10(3)/MCL (ref 0–0.2)
BASOPHILS NFR BLD AUTO: 1 %
BILIRUB SERPL-MCNC: 0.3 MG/DL
BILIRUBIN DIRECT+TOT PNL SERPL-MCNC: 0.1 MG/DL (ref 0–0.8)
BILIRUBIN DIRECT+TOT PNL SERPL-MCNC: 0.2 MG/DL (ref 0–0.5)
BUN SERPL-MCNC: 8.6 MG/DL (ref 8.4–25.7)
CALCIUM SERPL-MCNC: 8.7 MG/DL (ref 8.7–10.5)
CHLORIDE SERPL-SCNC: 106 MMOL/L (ref 98–107)
CO2 SERPL-SCNC: 26 MMOL/L (ref 23–31)
CREAT SERPL-MCNC: 0.83 MG/DL (ref 0.73–1.18)
EOSINOPHIL # BLD AUTO: 0.4 X10(3)/MCL (ref 0–0.9)
EOSINOPHIL NFR BLD AUTO: 5 %
ERYTHROCYTE [DISTWIDTH] IN BLOOD BY AUTOMATED COUNT: 14 % (ref 11.5–14.5)
GLOBULIN SER-MCNC: 2.9 GM/DL (ref 2.4–3.5)
GLUCOSE SERPL-MCNC: 91 MG/DL (ref 82–115)
HCT VFR BLD AUTO: 31.9 % (ref 40–51)
HGB BLD-MCNC: 10.4 GM/DL (ref 13.5–17.5)
IMM GRANULOCYTES # BLD AUTO: 0.02 10*3/UL
IMM GRANULOCYTES NFR BLD AUTO: 0 %
LYMPHOCYTES # BLD AUTO: 3 X10(3)/MCL (ref 0.6–4.6)
LYMPHOCYTES NFR BLD AUTO: 37 %
MCH RBC QN AUTO: 30.2 PG (ref 26–34)
MCHC RBC AUTO-ENTMCNC: 32.6 GM/DL (ref 31–37)
MCV RBC AUTO: 92.7 FL (ref 80–100)
MONOCYTES # BLD AUTO: 0.7 X10(3)/MCL (ref 0.1–1.3)
MONOCYTES NFR BLD AUTO: 9 %
NEUTROPHILS # BLD AUTO: 3.77 X10(3)/MCL (ref 2.1–9.2)
NEUTROPHILS NFR BLD AUTO: 47 %
NRBC BLD AUTO-RTO: 0 % (ref 0–0.2)
PLATELET # BLD AUTO: 182 X10(3)/MCL (ref 130–400)
PMV BLD AUTO: 10.1 FL (ref 7.4–10.4)
POTASSIUM SERPL-SCNC: 4.2 MMOL/L (ref 3.5–5.1)
PROT SERPL-MCNC: 5.8 GM/DL (ref 5.8–7.6)
RBC # BLD AUTO: 3.44 X10(6)/MCL (ref 4.5–5.9)
SODIUM SERPL-SCNC: 138 MMOL/L (ref 136–145)
WBC # SPEC AUTO: 8 X10(3)/MCL (ref 4.5–11)

## 2021-10-30 LAB — FINAL CULTURE: NORMAL

## 2021-11-01 LAB
FINAL CULTURE: NORMAL
FINAL CULTURE: NORMAL

## 2022-02-19 ENCOUNTER — HISTORICAL (OUTPATIENT)
Dept: ADMINISTRATIVE | Facility: HOSPITAL | Age: 67
End: 2022-02-19
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
## 2023-01-01 ENCOUNTER — PATIENT OUTREACH (OUTPATIENT)
Dept: ADMINISTRATIVE | Facility: HOSPITAL | Age: 68
End: 2023-01-01
Payer: MEDICARE

## 2023-01-01 ENCOUNTER — HOSPITAL ENCOUNTER (OUTPATIENT)
Dept: RADIOLOGY | Facility: HOSPITAL | Age: 68
Discharge: HOME | End: 2023-11-22
Payer: OTHER GOVERNMENT

## 2023-01-01 ENCOUNTER — OFFICE VISIT (OUTPATIENT)
Dept: VASCULAR SURGERY | Facility: CLINIC | Age: 68
End: 2023-01-01
Payer: MEDICARE

## 2023-01-01 ENCOUNTER — HOSPITAL ENCOUNTER (EMERGENCY)
Facility: HOSPITAL | Age: 68
Discharge: HOME | End: 2023-08-31
Attending: INTERNAL MEDICINE
Payer: OTHER GOVERNMENT

## 2023-01-01 ENCOUNTER — HOSPITAL ENCOUNTER (OUTPATIENT)
Dept: RADIOLOGY | Facility: HOSPITAL | Age: 68
Discharge: HOME | End: 2023-08-31
Attending: STUDENT IN AN ORGANIZED HEALTH CARE EDUCATION/TRAINING PROGRAM
Payer: MEDICARE

## 2023-01-01 ENCOUNTER — OFFICE VISIT (OUTPATIENT)
Dept: FAMILY MEDICINE | Facility: CLINIC | Age: 68
End: 2023-01-01
Payer: MEDICARE

## 2023-01-01 ENCOUNTER — OFFICE VISIT (OUTPATIENT)
Dept: VASCULAR SURGERY | Facility: CLINIC | Age: 68
End: 2023-01-01
Payer: OTHER GOVERNMENT

## 2023-01-01 ENCOUNTER — PATIENT OUTREACH (OUTPATIENT)
Dept: ADMINISTRATIVE | Facility: HOSPITAL | Age: 68
End: 2023-01-01
Payer: OTHER GOVERNMENT

## 2023-01-01 VITALS
HEART RATE: 61 BPM | BODY MASS INDEX: 20.96 KG/M2 | HEIGHT: 65 IN | DIASTOLIC BLOOD PRESSURE: 69 MMHG | OXYGEN SATURATION: 100 % | TEMPERATURE: 98 F | SYSTOLIC BLOOD PRESSURE: 106 MMHG | RESPIRATION RATE: 18 BRPM | WEIGHT: 125.81 LBS

## 2023-01-01 VITALS
HEIGHT: 64 IN | HEART RATE: 76 BPM | WEIGHT: 128.38 LBS | BODY MASS INDEX: 21.92 KG/M2 | OXYGEN SATURATION: 98 % | RESPIRATION RATE: 18 BRPM | DIASTOLIC BLOOD PRESSURE: 68 MMHG | TEMPERATURE: 98 F | SYSTOLIC BLOOD PRESSURE: 106 MMHG

## 2023-01-01 VITALS
RESPIRATION RATE: 16 BRPM | DIASTOLIC BLOOD PRESSURE: 74 MMHG | WEIGHT: 131 LBS | SYSTOLIC BLOOD PRESSURE: 121 MMHG | HEART RATE: 62 BPM | TEMPERATURE: 98 F | OXYGEN SATURATION: 100 % | BODY MASS INDEX: 21.8 KG/M2

## 2023-01-01 VITALS
WEIGHT: 129 LBS | HEIGHT: 65 IN | TEMPERATURE: 98 F | OXYGEN SATURATION: 98 % | DIASTOLIC BLOOD PRESSURE: 57 MMHG | RESPIRATION RATE: 20 BRPM | HEART RATE: 90 BPM | BODY MASS INDEX: 21.49 KG/M2 | SYSTOLIC BLOOD PRESSURE: 88 MMHG

## 2023-01-01 VITALS
HEIGHT: 64 IN | OXYGEN SATURATION: 100 % | DIASTOLIC BLOOD PRESSURE: 65 MMHG | SYSTOLIC BLOOD PRESSURE: 98 MMHG | BODY MASS INDEX: 22.36 KG/M2 | HEART RATE: 89 BPM | RESPIRATION RATE: 17 BRPM | TEMPERATURE: 98 F | WEIGHT: 131 LBS

## 2023-01-01 VITALS
TEMPERATURE: 98 F | RESPIRATION RATE: 18 BRPM | HEIGHT: 65 IN | HEART RATE: 93 BPM | OXYGEN SATURATION: 99 % | SYSTOLIC BLOOD PRESSURE: 97 MMHG | WEIGHT: 126.81 LBS | BODY MASS INDEX: 21.13 KG/M2 | DIASTOLIC BLOOD PRESSURE: 65 MMHG

## 2023-01-01 VITALS
SYSTOLIC BLOOD PRESSURE: 127 MMHG | WEIGHT: 126 LBS | OXYGEN SATURATION: 100 % | DIASTOLIC BLOOD PRESSURE: 82 MMHG | HEIGHT: 65 IN | BODY MASS INDEX: 20.99 KG/M2 | HEART RATE: 74 BPM | TEMPERATURE: 98 F | RESPIRATION RATE: 19 BRPM

## 2023-01-01 DIAGNOSIS — E78.5 HYPERLIPIDEMIA, UNSPECIFIED HYPERLIPIDEMIA TYPE: ICD-10-CM

## 2023-01-01 DIAGNOSIS — I71.40 ABDOMINAL AORTIC ANEURYSM (AAA) WITHOUT RUPTURE, UNSPECIFIED PART: ICD-10-CM

## 2023-01-01 DIAGNOSIS — Z00.00 HEALTH MAINTENANCE EXAMINATION: ICD-10-CM

## 2023-01-01 DIAGNOSIS — I71.43 INFRARENAL ABDOMINAL AORTIC ANEURYSM (AAA) WITHOUT RUPTURE: Primary | ICD-10-CM

## 2023-01-01 DIAGNOSIS — I10 HYPERTENSION, UNSPECIFIED TYPE: ICD-10-CM

## 2023-01-01 DIAGNOSIS — I71.40 ABDOMINAL AORTIC ANEURYSM (AAA) WITHOUT RUPTURE, UNSPECIFIED PART: Primary | ICD-10-CM

## 2023-01-01 DIAGNOSIS — Z00.00 HEALTH MAINTENANCE EXAMINATION: Primary | ICD-10-CM

## 2023-01-01 DIAGNOSIS — G62.9 PERIPHERAL POLYNEUROPATHY: Primary | ICD-10-CM

## 2023-01-01 DIAGNOSIS — Z86.79 HISTORY OF ABDOMINAL AORTIC ANEURYSM (AAA): ICD-10-CM

## 2023-01-01 DIAGNOSIS — G62.9 PERIPHERAL POLYNEUROPATHY: ICD-10-CM

## 2023-01-01 DIAGNOSIS — I71.43 INFRARENAL ABDOMINAL AORTIC ANEURYSM (AAA) WITHOUT RUPTURE: ICD-10-CM

## 2023-01-01 DIAGNOSIS — H25.13 AGE-RELATED NUCLEAR CATARACT, BILATERAL: Primary | ICD-10-CM

## 2023-01-01 DIAGNOSIS — L21.9 SEBORRHEIC DERMATITIS: ICD-10-CM

## 2023-01-01 DIAGNOSIS — Z86.79 HISTORY OF ABDOMINAL AORTIC ANEURYSM (AAA): Primary | ICD-10-CM

## 2023-01-01 DIAGNOSIS — F17.200 NEEDS SMOKING CESSATION EDUCATION: ICD-10-CM

## 2023-01-01 DIAGNOSIS — F25.0 SCHIZOAFFECTIVE DISORDER, BIPOLAR TYPE: ICD-10-CM

## 2023-01-01 DIAGNOSIS — R73.01 IMPAIRED FASTING GLUCOSE: Primary | ICD-10-CM

## 2023-01-01 LAB
ALBUMIN SERPL-MCNC: 3.6 G/DL (ref 3.4–4.8)
ALBUMIN/GLOB SERPL: 0.8 RATIO (ref 1.1–2)
ALP SERPL-CCNC: 70 UNIT/L (ref 40–150)
ALT SERPL-CCNC: 17 UNIT/L (ref 0–55)
AST SERPL-CCNC: 24 UNIT/L (ref 5–34)
BASOPHILS # BLD AUTO: 0.06 X10(3)/MCL
BASOPHILS NFR BLD AUTO: 0.7 %
BILIRUB SERPL-MCNC: 0.3 MG/DL
BUN SERPL-MCNC: 15.4 MG/DL (ref 8.4–25.7)
CALCIUM SERPL-MCNC: 9.7 MG/DL (ref 8.8–10)
CHLORIDE SERPL-SCNC: 102 MMOL/L (ref 98–107)
CHOLEST SERPL-MCNC: 107 MG/DL
CHOLEST/HDLC SERPL: 3 {RATIO} (ref 0–5)
CO2 SERPL-SCNC: 28 MMOL/L (ref 23–31)
CREAT SERPL-MCNC: 1.33 MG/DL (ref 0.73–1.18)
CREAT SERPL-MCNC: 1.5 MG/DL (ref 0.5–1.4)
EOSINOPHIL # BLD AUTO: 0.78 X10(3)/MCL (ref 0–0.9)
EOSINOPHIL NFR BLD AUTO: 9.2 %
ERYTHROCYTE [DISTWIDTH] IN BLOOD BY AUTOMATED COUNT: 13.8 % (ref 11.5–17)
EST. AVERAGE GLUCOSE BLD GHB EST-MCNC: 108.3 MG/DL
GFR SERPLBLD CREATININE-BSD FMLA CKD-EPI: 58 MLS/MIN/1.73/M2
GLOBULIN SER-MCNC: 4.4 GM/DL (ref 2.4–3.5)
GLUCOSE SERPL-MCNC: 107 MG/DL (ref 82–115)
HBA1C MFR BLD: 5.4 %
HCT VFR BLD AUTO: 37.5 % (ref 42–52)
HDLC SERPL-MCNC: 33 MG/DL (ref 35–60)
HGB BLD-MCNC: 11.5 G/DL (ref 14–18)
HIV 1+2 AB+HIV1 P24 AG SERPL QL IA: NONREACTIVE
IMM GRANULOCYTES # BLD AUTO: 0.03 X10(3)/MCL (ref 0–0.04)
IMM GRANULOCYTES NFR BLD AUTO: 0.4 %
LDLC SERPL CALC-MCNC: 61 MG/DL (ref 50–140)
LYMPHOCYTES # BLD AUTO: 2.36 X10(3)/MCL (ref 0.6–4.6)
LYMPHOCYTES NFR BLD AUTO: 28 %
MCH RBC QN AUTO: 29 PG (ref 27–31)
MCHC RBC AUTO-ENTMCNC: 30.7 G/DL (ref 33–36)
MCV RBC AUTO: 94.7 FL (ref 80–94)
MONOCYTES # BLD AUTO: 0.73 X10(3)/MCL (ref 0.1–1.3)
MONOCYTES NFR BLD AUTO: 8.6 %
NEUTROPHILS # BLD AUTO: 4.48 X10(3)/MCL (ref 2.1–9.2)
NEUTROPHILS NFR BLD AUTO: 53.1 %
NRBC BLD AUTO-RTO: 0 %
PLATELET # BLD AUTO: 219 X10(3)/MCL (ref 130–400)
PMV BLD AUTO: 11 FL (ref 7.4–10.4)
POTASSIUM SERPL-SCNC: 4.2 MMOL/L (ref 3.5–5.1)
PROT SERPL-MCNC: 8 GM/DL (ref 5.8–7.6)
PSA SERPL-MCNC: 2.04 NG/ML
RBC # BLD AUTO: 3.96 X10(6)/MCL (ref 4.7–6.1)
RPR SER QL: NORMAL
RPR SER-TITR: NORMAL {TITER}
SAMPLE: ABNORMAL
SODIUM SERPL-SCNC: 139 MMOL/L (ref 136–145)
T PALLIDUM AB SER QL: REACTIVE
T PALLIDUM AB SER QL: REACTIVE
TRIGL SERPL-MCNC: 65 MG/DL (ref 34–140)
VLDLC SERPL CALC-MCNC: 13 MG/DL
WBC # SPEC AUTO: 8.44 X10(3)/MCL (ref 4.5–11.5)

## 2023-01-01 PROCEDURE — 83036 HEMOGLOBIN GLYCOSYLATED A1C: CPT | Performed by: STUDENT IN AN ORGANIZED HEALTH CARE EDUCATION/TRAINING PROGRAM

## 2023-01-01 PROCEDURE — 36415 COLL VENOUS BLD VENIPUNCTURE: CPT

## 2023-01-01 PROCEDURE — 99214 OFFICE O/P EST MOD 30 MIN: CPT | Mod: PBBFAC

## 2023-01-01 PROCEDURE — 99281 EMR DPT VST MAYX REQ PHY/QHP: CPT

## 2023-01-01 PROCEDURE — 36415 COLL VENOUS BLD VENIPUNCTURE: CPT | Performed by: STUDENT IN AN ORGANIZED HEALTH CARE EDUCATION/TRAINING PROGRAM

## 2023-01-01 PROCEDURE — 87389 HIV-1 AG W/HIV-1&-2 AB AG IA: CPT | Performed by: STUDENT IN AN ORGANIZED HEALTH CARE EDUCATION/TRAINING PROGRAM

## 2023-01-01 PROCEDURE — 86780 TREPONEMA PALLIDUM: CPT | Performed by: STUDENT IN AN ORGANIZED HEALTH CARE EDUCATION/TRAINING PROGRAM

## 2023-01-01 PROCEDURE — 75635 CT ANGIO ABDOMINAL ARTERIES: CPT | Mod: TC

## 2023-01-01 PROCEDURE — 99215 OFFICE O/P EST HI 40 MIN: CPT | Mod: PBBFAC | Performed by: STUDENT IN AN ORGANIZED HEALTH CARE EDUCATION/TRAINING PROGRAM

## 2023-01-01 PROCEDURE — 80061 LIPID PANEL: CPT | Performed by: STUDENT IN AN ORGANIZED HEALTH CARE EDUCATION/TRAINING PROGRAM

## 2023-01-01 PROCEDURE — 85025 COMPLETE CBC W/AUTO DIFF WBC: CPT | Performed by: STUDENT IN AN ORGANIZED HEALTH CARE EDUCATION/TRAINING PROGRAM

## 2023-01-01 PROCEDURE — 80053 COMPREHEN METABOLIC PANEL: CPT | Performed by: STUDENT IN AN ORGANIZED HEALTH CARE EDUCATION/TRAINING PROGRAM

## 2023-01-01 PROCEDURE — 84153 ASSAY OF PSA TOTAL: CPT

## 2023-01-01 PROCEDURE — 76775 US EXAM ABDO BACK WALL LIM: CPT | Mod: TC

## 2023-01-01 PROCEDURE — 86592 SYPHILIS TEST NON-TREP QUAL: CPT | Performed by: STUDENT IN AN ORGANIZED HEALTH CARE EDUCATION/TRAINING PROGRAM

## 2023-01-01 PROCEDURE — 25500020 PHARM REV CODE 255

## 2023-01-01 PROCEDURE — 99215 OFFICE O/P EST HI 40 MIN: CPT | Mod: PBBFAC

## 2023-01-01 RX ORDER — ROSUVASTATIN CALCIUM 20 MG/1
20 TABLET, COATED ORAL DAILY
COMMUNITY
End: 2023-01-01 | Stop reason: SDUPTHER

## 2023-01-01 RX ORDER — ROSUVASTATIN CALCIUM 20 MG/1
20 TABLET, COATED ORAL DAILY
Qty: 90 TABLET | Refills: 1 | Status: SHIPPED | OUTPATIENT
Start: 2023-01-01 | End: 2024-04-14

## 2023-01-01 RX ORDER — GABAPENTIN 300 MG/1
300 CAPSULE ORAL NIGHTLY
COMMUNITY
End: 2023-01-01 | Stop reason: SDUPTHER

## 2023-01-01 RX ORDER — OLANZAPINE 20 MG/1
0.5 TABLET ORAL 2 TIMES DAILY
Status: ON HOLD | COMMUNITY
End: 2024-01-01 | Stop reason: HOSPADM

## 2023-01-01 RX ORDER — GABAPENTIN 300 MG/1
300 CAPSULE ORAL NIGHTLY
Qty: 90 CAPSULE | Refills: 1 | Status: SHIPPED | OUTPATIENT
Start: 2023-01-01 | End: 2024-04-14

## 2023-01-01 RX ORDER — METOPROLOL SUCCINATE 50 MG/1
50 TABLET, EXTENDED RELEASE ORAL DAILY
Qty: 90 TABLET | Refills: 1 | Status: SHIPPED | OUTPATIENT
Start: 2023-01-01 | End: 2023-01-01 | Stop reason: SDUPTHER

## 2023-01-01 RX ORDER — GABAPENTIN 300 MG/1
300 CAPSULE ORAL NIGHTLY
Qty: 90 CAPSULE | Refills: 1 | Status: SHIPPED | OUTPATIENT
Start: 2023-01-01 | End: 2023-01-01 | Stop reason: SDUPTHER

## 2023-01-01 RX ORDER — HALOPERIDOL DECANOATE 100 MG/ML
1.5 INJECTION INTRAMUSCULAR
COMMUNITY

## 2023-01-01 RX ORDER — LORATADINE 10 MG/1
10 TABLET ORAL DAILY
COMMUNITY
Start: 2021-10-29

## 2023-01-01 RX ORDER — ROSUVASTATIN CALCIUM 20 MG/1
20 TABLET, COATED ORAL DAILY
Qty: 90 TABLET | Refills: 1 | Status: SHIPPED | OUTPATIENT
Start: 2023-01-01 | End: 2023-01-01 | Stop reason: SDUPTHER

## 2023-01-01 RX ORDER — METOPROLOL SUCCINATE 50 MG/1
50 TABLET, EXTENDED RELEASE ORAL DAILY
Qty: 90 TABLET | Refills: 1 | Status: SHIPPED | OUTPATIENT
Start: 2023-01-01 | End: 2024-04-14

## 2023-01-01 RX ORDER — NAPROXEN SODIUM 220 MG/1
81 TABLET, FILM COATED ORAL DAILY
COMMUNITY
Start: 2021-10-29

## 2023-01-01 RX ORDER — BENZTROPINE MESYLATE 1 MG/1
0.5 TABLET ORAL 2 TIMES DAILY
COMMUNITY

## 2023-01-01 RX ORDER — LISINOPRIL 10 MG/1
5 TABLET ORAL
COMMUNITY
Start: 2022-12-21 | End: 2023-01-01 | Stop reason: SDUPTHER

## 2023-01-01 RX ORDER — METOPROLOL SUCCINATE 50 MG/1
50 TABLET, EXTENDED RELEASE ORAL DAILY
COMMUNITY
End: 2023-01-01 | Stop reason: SDUPTHER

## 2023-01-01 RX ADMIN — IOPAMIDOL 150 ML: 755 INJECTION, SOLUTION INTRAVENOUS at 11:11

## 2023-05-23 PROBLEM — F43.10 POSTTRAUMATIC STRESS DISORDER: Status: ACTIVE | Noted: 2023-01-01

## 2023-05-23 PROBLEM — Z72.0 TOBACCO USER: Status: ACTIVE | Noted: 2023-01-01

## 2023-05-23 PROBLEM — Z91.89 AT RISK FOR KNOWLEDGE DEFICIT: Status: ACTIVE | Noted: 2023-01-01

## 2023-05-23 PROBLEM — F99 CHRONIC MENTAL DISORDER: Status: ACTIVE | Noted: 2023-01-01

## 2023-05-23 NOTE — PROGRESS NOTES
[facility] Progress Note    ID:  Red Aaron Jr.   MRN:  [id number]     5/23/2023    Chief Complaint:      History of Present Illness:  Red Aaron Jr. is a 68 y.o. male who presents to [facility] FM clinic for     Interval Hx:  Pt requested referral to outside provider was [facility].    Acute Issues:  Denies    Chronic Issues:  Schizophrenia/PTSD- Dr. Wong with [facility] in [place], [place]. On haloperidol injection, Zyprexa 10 mg b.i.d., Cogentin 0.5 mg b.i.d.  HTN- med compliant with lisinopril 10 and metoprolol 50 mg b.i.d.  HLD- med compliant with rosuvastatin 20 mg daily  History of AAA- most recent available imaging CTA 2017 4.7 cm, includes infrarenal. Pt thinks he had abdominal US 1-2 years ago  Tobacco Use- 50+ pack-year smoker as well as daily chew. Not ready to quit  BL- cataracts. Scheduled for right cataract removal 06/01/2023    Assistive devices- in need of dentures     (per pt)  Colorectal cancer screening 2019, Dr. Brenda sethi gastro. ?10 year follow up  PSA-informed denial   CT lung cancer- informed refusal today  AAA- see above  Immunizations- needs PCV and zoster (did not discuss today)      Health Maintenance   Topic Date Due    TETANUS VACCINE  Never done    Abdominal Aortic Aneurysm Screening  Never done    Lipid Panel  10/23/2024    Hepatitis C Screening  Completed       Past Medical History:   Diagnosis Date    Hyperlipidemia     Hypertension     PTSD (post-traumatic stress disorder)     Schizophrenia        Past Surgical History:   Procedure Laterality Date    TONSILLECTOMY         Social History     Tobacco Use    Smoking status: Every Day     Packs/day: 1.00     Types: Cigarettes     Start date: 1973    Smokeless tobacco: Current     Types: Chew   Substance Use Topics    Alcohol use: Yes     Alcohol/week: 1.0 standard drink     Types: 1 Glasses of wine per week    Drug use: Not Currently     Frequency: 2.0 times per week     Types: Marijuana       History  "reviewed. No pertinent family history.      Current Outpatient Medications:     aspirin 81 MG Chew, Take 81 mg by mouth once daily., Disp: , Rfl:     benztropine (COGENTIN) 1 MG tablet, Take 0.5 mg by mouth 2 (two) times a day., Disp: , Rfl:     haloperidol decanoate (HALDOL DECANOATE) 100 mg/mL injection, Inject 1.5 mLs into the muscle every 21 days., Disp: , Rfl:     loratadine (CLARITIN) 10 mg tablet, Take 10 mg by mouth once daily., Disp: , Rfl:     OLANZapine (ZYPREXA) 20 MG tablet, Take 0.5 tablets by mouth 2 (two) times a day., Disp: , Rfl:     gabapentin (NEURONTIN) 300 MG capsule, Take 1 capsule (300 mg total) by mouth nightly., Disp: 90 capsule, Rfl: 1    metoprolol succinate (TOPROL-XL) 50 MG 24 hr tablet, Take 1 tablet (50 mg total) by mouth once daily., Disp: 90 tablet, Rfl: 1    multivitamin (ONE DAILY MULTIVITAMIN) per tablet, Take 1 tablet by mouth once daily., Disp: , Rfl:     rosuvastatin (CRESTOR) 20 MG tablet, Take 1 tablet (20 mg total) by mouth once daily., Disp: 90 tablet, Rfl: 1    Review of patient's allergies indicates:  No Known Allergies      Review of Systems:  See HPI      Physical Exam:  BP 98/65 (BP Location: Right arm, Patient Position: Sitting, BP Method: Medium (Automatic))   Pulse 89   Temp 98.1 °F (36.7 °C) (Oral)   Resp 17   Ht 5' 4.02" (1.626 m)   Wt 59.4 kg (131 lb)   SpO2 100%   BMI 22.47 kg/m²     Gen- appears comfortable  HEENT- PERRLA, EOM intact.  Bilateral cataracts.  Nares patent without rhinorrhea.  Oropharynx clear without oropharyngeal erythema or edema.  Edentulous  CV- regular rate and rhythm no murmurs rubs or gallops, pulsatile abdominal aorta with bruit.  Palpable radial and DP pulses bilaterally  Resp- lungs CTA bilaterally  GI- abdomen is soft, nontender, nondistended  - no suprapubic tenderness  Neuro- alert and responding appropriately to questions commands  Skin- yellow oily scale bl eyebrows, dry skin bilateral lower legs and " feet      Assessment/Plan:    1. History of abdominal aortic aneurysm (AAA)  - see above, no recent imaging since 2017  - Plans for follow up abdominal CTA pending evaluation of Creatine     2. Hyperlipidemia, unspecified hyperlipidemia type  - [facility]  - med refills sent upon request  - rosuvastatin (CRESTOR) 20 MG tablet; Take 1 tablet (20 mg total) by mouth once daily.  Dispense: 90 tablet; Refill: 1    3. Hypertension, unspecified type  - Stop lisinopril as BP low range today  - metoprolol succinate (TOPROL-XL) 50 MG 24 hr tablet; Take 1 tablet (50 mg total) by mouth once daily.  Dispense: 90 tablet; Refill: 1    4. Peripheral polyneuropathy  - well controlled  - meds reviewed and appropriate, refills as below   - gabapentin (NEURONTIN) 300 MG capsule; Take 1 capsule (300 mg total) by mouth nightly.  Dispense: 90 capsule; Refill: 1    5. Seborrheic dermatitis  - prescribed ketoconazole cream    HM  See above      Rip Ansari MD  [employer]  Resident HO2

## 2023-05-24 NOTE — PROGRESS NOTES
Faculty Attestation: Red Aaron Jr.  was seen in Family Medicine Clinic. Discussed with resident at the time of the visit. History of Present Illness, Physical Exam, and Assessment and Plan reviewed. Treatment plan is reasonable and appropriate. Compliance with treatment recommendations is important.        Danilo Griggs MD

## 2023-06-14 PROBLEM — R73.01 IMPAIRED FASTING GLUCOSE: Status: ACTIVE | Noted: 2023-01-01

## 2023-06-14 PROBLEM — H25.13 AGE-RELATED NUCLEAR CATARACT, BILATERAL: Status: ACTIVE | Noted: 2023-01-01

## 2023-06-14 PROBLEM — F99 CHRONIC MENTAL DISORDER: Status: RESOLVED | Noted: 2023-01-01 | Resolved: 2023-01-01

## 2023-06-14 PROBLEM — Z91.89 AT RISK FOR KNOWLEDGE DEFICIT: Status: RESOLVED | Noted: 2023-01-01 | Resolved: 2023-01-01

## 2023-06-14 PROBLEM — I25.10 ARTERIOSCLEROSIS OF CORONARY ARTERY: Status: ACTIVE | Noted: 2023-01-01

## 2023-06-14 PROBLEM — F17.200 TOBACCO DEPENDENCE SYNDROME: Status: ACTIVE | Noted: 2023-01-01

## 2023-06-14 PROBLEM — Z77.090 H/O: ASBESTOS EXPOSURE: Status: ACTIVE | Noted: 2023-01-01

## 2023-06-14 PROBLEM — I10 HYPERTENSION: Status: ACTIVE | Noted: 2023-01-01

## 2023-06-14 PROBLEM — F31.0: Status: ACTIVE | Noted: 2023-01-01

## 2023-06-14 PROBLEM — F25.0 SCHIZOAFFECTIVE DISORDER, BIPOLAR TYPE: Status: ACTIVE | Noted: 2023-01-01

## 2023-06-14 PROBLEM — E78.5 HYPERLIPIDEMIA: Status: ACTIVE | Noted: 2023-01-01

## 2023-06-14 NOTE — PROGRESS NOTES
Population Health Outreach.  [facility] Outreach   Spoke with friend Hayley, she will call [facility] and give PCP name, Rip Ansari.

## 2023-06-14 NOTE — PROGRESS NOTES
[facility] Clinic Note    Subjective:     Patient ID: Red Aaron Jr. is a 68 y.o. male    Chief Complaint:   Chief Complaint   Patient presents with    Surgery Clearance         HPI  69 yo M presents for Surgery Clearance for Cataract Surgery    Acute Concerns:  Patient accompanied by wife, who gives Hx. Wife reports patient will be scheduled for cataract surgery once he receives surgical clearance    Patient denies any cardiovascular history, no history of MI or CVA per patient. He does have Hx of HTN and tobacco use, reports trying to quit smoking now. Denies any chest pain at rest or with exertion, dyspnea at rest or with exertion. Able to perform all ADLs with minimal to no assistance.      Review of Systems  As per HPI    Objective:     Vitals:    06/14/23 1026   BP: 106/68   Pulse: 76   Resp: 18   Temp: 98.1 °F (36.7 °C)       Physical Exam    General: Well developed, no acute distress  CV: Regular rate rhythm, no edema, 2+ peripheral pulses  Resp: Non-labored breathing, symmetrical chest expansion bilaterally  Abd: soft, non-distended, non-tender to palpation, +BS, no organomegaly  MSK: no gross deformities  Skin: warm, dry  Neuro: AAOx4, no focal motor deficits, no focal sensory deficits    Assessment:     Problem List Items Addressed This Visit          Unprioritized    Age-related nuclear cataract, bilateral - Primary       Plan:       Surgical Clearance for Cataract Surgery  - Patient is low risk for major adverse  cardiac event for this low risk procedure  - Patient may continue with daily 81mg Aspirin  - Advised to continue with smoking cessation issues      RTC in 1 month for already scheduled routine follow up with PCP    Keara Johansen MD  [employer]   PGY-3

## 2023-07-17 NOTE — PROGRESS NOTES
"[facility] OFFICE VISIT NOTE  Red Aaron Jr.  [id number]  07/18/2023    Chief Complaint   Patient presents with    Follow-up    Hypertension     6 month follow-up  No c/o voiced.       Red Aaron Jr. is a 68 y.o. male  presenting to [facility] for follow up. Reports no issues    Chronic issues  Schizophrenia/PTSD  -[facility] [place] [place] On haloperidol injection, Zyprexa 10 mg b.i.d., Cogentin 0.5 mg b.i.d.  -Denies visual/auditory hallucinations    HTN  -On  lisinopril 10 and metoprolol 50 mg b.i.d.  -Home BP reading 120's/70's  -Denies chest pain, SOB, HA, vision changes    HLD  -On rosuvastatin 20 mg daily  -Denies myalgias    HM  -History of AAA- most recent available imaging CTA 2017 4.7 cm, includes infrarenal. Has abdominal US ordered, pending completion  -[facility]      Review of Systems  Const: no fever or chills  Heart: no chest pain or palpitations  Lungs: no SOB, cough  Abd: no nausea, vomiting, diarrhea  Neuro: no visual/auditory hallucinations  Skin: no rash      Blood pressure 106/69, pulse 61, temperature 98.1 °F (36.7 °C), temperature source Oral, resp. rate 18, height 5' 5" (1.651 m), weight 57.1 kg (125 lb 12.8 oz), SpO2 100 %.   Physical Exam  Gen: no acute distress  CVS: RRR, no r/g/m  Lungs: CTABL  Abd: NTND, +BS  Ext: no edema  Neuro: no HA, dizziness      Current Medications:   Current Outpatient Medications   Medication Sig Dispense Refill    aspirin 81 MG Chew Take 81 mg by mouth once daily.      benztropine (COGENTIN) 1 MG tablet Take 0.5 mg by mouth 2 (two) times a day.      gabapentin (NEURONTIN) 300 MG capsule Take 1 capsule (300 mg total) by mouth nightly. 90 capsule 1    haloperidol decanoate (HALDOL DECANOATE) 100 mg/mL injection Inject 1.5 mLs into the muscle every 21 days.      lisinopriL 10 MG tablet 5 mg.      loratadine (CLARITIN) 10 mg tablet Take 10 mg by mouth once daily.      metoprolol succinate (TOPROL-XL) 50 MG " 24 hr tablet Take 1 tablet (50 mg total) by mouth once daily. 90 tablet 1    multivitamin (ONE DAILY MULTIVITAMIN) per tablet Take 1 tablet by mouth once daily.      OLANZapine (ZYPREXA) 20 MG tablet Take 0.5 tablets by mouth 2 (two) times a day.      rosuvastatin (CRESTOR) 20 MG tablet Take 1 tablet (20 mg total) by mouth once daily. 90 tablet 1     No current facility-administered medications for this visit.       Assessment:   1. Health maintenance examination    2. Hypertension, unspecified type    3. Hyperlipidemia, unspecified hyperlipidemia type    4. Schizoaffective disorder, bipolar type        Plan:  -CT abd/pelvis 1/2017: heterogeneous enlarged prostate which encroaches the base of the urinary bladder, malignancy can not be excluded. PSA ordered today's visit negative  -No labs today, awaiting [facility]  -Has abd  scheduled august, 2023    Orders Placed This Encounter    PSA, Screening       Return to clinic in 3 months for follow up, or sooner if needed.     Kristian Lawson  [facility] Resident

## 2023-07-18 NOTE — PROGRESS NOTES
Discussed with resident at time of encounter 07-17-23.  [facility]  Hx. of infrarenal aortic aneurysm.  Prostate enlargement noted on past imaging study.    Resident's note reviewed 07-18-23.  Agree with assessment; plan of care appropriate.  Professional services provided in an outpatient primary care center affiliated with a teaching institution.

## 2023-08-31 NOTE — ED PROVIDER NOTES
Encounter Date: 8/31/2023       History     Chief Complaint   Patient presents with    abnormal ultrasound     Pt brought down from US for LARGE AAA.  PT VOICES NO COMPLAINTS. VSS.      The history is provided by the patient and a relative.       68 year old male with history of Schizophrenia/PTSD, HTN, HLD, extensive smoking history presents to ED ultrasound results today showing an enlarged AAA. Patient denies any chest pain, shortness of breath, abdominal pain, rectal bleeding, hematuria. US Abdominal Aorta report today shows infrarenal abdominal aortic aneurysm measuring up to 6.4 cm. On the prior CT the abdominal aorta measured up to 5.2 cm distally.    Review of patient's allergies indicates:  No Known Allergies  Past Medical History:   Diagnosis Date    Hyperlipidemia     Hypertension     PTSD (post-traumatic stress disorder)     Schizophrenia      Past Surgical History:   Procedure Laterality Date    TONSILLECTOMY       History reviewed. No pertinent family history.  Social History     Tobacco Use    Smoking status: Every Day     Current packs/day: 0.50     Average packs/day: 0.5 packs/day for 50.7 years (25.3 ttl pk-yrs)     Types: Cigarettes     Start date: 1973    Smokeless tobacco: Former     Types: Chew   Substance Use Topics    Alcohol use: Not Currently     Alcohol/week: 1.0 standard drink of alcohol     Types: 1 Glasses of wine per week    Drug use: Not Currently     Frequency: 2.0 times per week     Types: Marijuana     Review of Systems   Constitutional:  Negative for fever.   Respiratory:  Negative for chest tightness and shortness of breath.    Cardiovascular:  Negative for chest pain, palpitations and leg swelling.   Gastrointestinal:  Negative for abdominal distention, abdominal pain, anal bleeding, blood in stool, constipation, diarrhea, nausea, rectal pain and vomiting.   Genitourinary:  Negative for dysuria and hematuria.   Neurological:  Negative for dizziness, light-headedness and  headaches.   All other systems reviewed and are negative.      Physical Exam     Initial Vitals [08/31/23 0734]   BP Pulse Resp Temp SpO2   113/74 69 16 97.9 °F (36.6 °C) 99 %      MAP       --         Physical Exam    Nursing note and vitals reviewed.  Constitutional: He appears well-developed and well-nourished. No distress.   HENT:   Head: Normocephalic and atraumatic.   Eyes: Conjunctivae and EOM are normal. Pupils are equal, round, and reactive to light.   Neck: Neck supple. No JVD present.   Normal range of motion.  Cardiovascular:  Normal rate, regular rhythm, normal heart sounds and intact distal pulses.           No murmur heard.  Pulmonary/Chest: Breath sounds normal. No respiratory distress. He has no wheezes. He exhibits no tenderness.   Abdominal: Abdomen is soft. Bowel sounds are normal. He exhibits no distension. There is no abdominal tenderness.   Pulsatile mass appreciated diffusely in lower abdominal area There is no rebound and no guarding.   Musculoskeletal:         General: No edema. Normal range of motion.      Cervical back: Normal range of motion and neck supple.     Neurological: He is alert and oriented to person, place, and time. He has normal strength. GCS score is 15. GCS eye subscore is 4. GCS verbal subscore is 5. GCS motor subscore is 6.   Skin: Skin is warm and dry. No rash noted.   Psychiatric: He has a normal mood and affect. His behavior is normal. Judgment and thought content normal.         ED Course   Procedures  Labs Reviewed - No data to display       Imaging Results    None          Medications - No data to display  Medical Decision Making  68 year old male with history of HTN, HLD, and extensive smoking presents from US abd session for enlarged AAA. Patient denies any symptoms including chest pain, shortness of breath, rectal bleeding. Physical examination was unremarkable other than a palpable pulsatile mass over the lower abdominal area. US Abdominal Aorta report today  shows infrarenal abdominal aortic aneurysm measuring up to 6.4 cm. On the prior CT the abdominal aorta measured up to 5.2 cm distally. Surgery consulted and will follow up with patient. VSS, patient stable for discharge for follow up.       Amount and/or Complexity of Data Reviewed  External Data Reviewed: radiology.     Details: US Abdominal Aorta report today shows infrarenal abdominal aortic aneurysm measuring up to 6.4 cm. Prior CT of abdominal aorta in 2017 measured up to 5.2 cm distally  Radiology: independent interpretation performed.  Discussion of management or test interpretation with external provider(s): Discussed case with Dr. Wright (general surgery) who agrees to see patient.     Risk  Prescription drug management.              Attending Attestation:   Physician Attestation Statement for Resident:  As the supervising MD   Physician Attestation Statement: I have personally seen and examined this patient.   I agree with the above history.  -:   As the supervising MD I agree with the above PE.     As the supervising MD I agree with the above treatment, course, plan, and disposition.     I have reviewed the following: old records at this facility.                                Clinical Impression:   Final diagnoses:  [I71.40] Abdominal aortic aneurysm (AAA) without rupture, unspecified part (Primary)        ED Disposition Condition    Discharge Stable          ED Prescriptions    None       Follow-up Information       Follow up With Specialties Details Why Contact Info    Rip Ansari MD Family Medicine In 2 weeks  [facility] [facility] [address] [phone]      [facility] Emergency Medicine  If symptoms worsen [address] [place] [id number] [phone]    [facility] Vascular Surgery Schedule an appointment as soon as possible for a visit in 1 month  [address] [place] [phone]             Jonny  MD Timmy  Resident  08/31/23 0913       Murray Servin MD  08/31/23 1823

## 2023-09-13 NOTE — PROGRESS NOTES
Population Health Outreach.  Follow up [facility] PCP correction, spoke with Hayley, stated she will call [facility] again and give correct PCP info.

## 2023-10-17 NOTE — PROGRESS NOTES
[facility] FM Clinic Progress Note    ID:  Red Aaron Jr.   MRN:  [id number]     10/17/2023    Chief Complaint:      History of Present Illness:  Red Aaron Jr. is a 68 y.o. male who presents to [facility] FM clinic for     Interval Hx:  Pt requested referral to outside provider [facility]    Acute Issues:  Denies    Chronic Issues Addressed today:    HTN- med compliant with lisinopril 5 and metoprolol 50 mg b.i.d.  HLD- med compliant with rosuvastatin 20 mg daily, no muscle aches  History of AAA- See US report. Appt with Vasc surgery upcoming 10/24/23.   Peripheral Neuropathy- well controlled with gabapentin 300      Sch[facility]ia[facility] [place], [place]. On haloperidol injection, Zyprexa 10 mg b.i.d., Cogentin 0.5 mg b.i.d.  Tobacco Use- 50+ pack-year smoker as well as daily chew. Not ready to quit  BL- cataracts. Scheduled for right cataract removal 06/01/2023    Assistive devices- in need of dentures    HM (per pt)  Colorectal cancer screening 2019, Dr. Brenda sethi gastro. ?10 year follow up  PSA- wnl 7/2023   CT lung cancer- informed refusal today  AAA- see above  Immunizations- UTD      Health Maintenance   Topic Date Due    Colorectal Cancer Screening  Never done    LDCT Lung Screen  Never done    Shingles Vaccine (1 of 2) Never done    TETANUS VACCINE  07/21/2021    Lipid Panel  10/23/2024    Hepatitis C Screening  Completed    Abdominal Aortic Aneurysm Screening  Completed       Past Medical History:   Diagnosis Date    Hyperlipidemia     Hypertension     PTSD (post-traumatic stress disorder)     Schizophrenia        Past Surgical History:   Procedure Laterality Date    TONSILLECTOMY         Social History     Tobacco Use    Smoking status: Every Day     Current packs/day: 0.50     Average packs/day: 0.5 packs/day for 50.8 years (25.4 ttl pk-yrs)     Types: Cigarettes     Start date: 1973    Smokeless tobacco: Former     Types: Chew   Substance Use Topics     "Alcohol use: Not Currently     Alcohol/week: 1.0 standard drink of alcohol     Types: 1 Glasses of wine per week    Drug use: Not Currently     Frequency: 2.0 times per week     Types: Marijuana       No family history on file.      Current Outpatient Medications:     aspirin 81 MG Chew, Take 81 mg by mouth once daily., Disp: , Rfl:     benztropine (COGENTIN) 1 MG tablet, Take 0.5 mg by mouth 2 (two) times a day., Disp: , Rfl:     gabapentin (NEURONTIN) 300 MG capsule, Take 1 capsule (300 mg total) by mouth nightly., Disp: 90 capsule, Rfl: 1    haloperidol decanoate (HALDOL DECANOATE) 100 mg/mL injection, Inject 1.5 mLs into the muscle every 21 days., Disp: , Rfl:     lisinopriL (PRINIVIL,ZESTRIL) 5 MG Tab, Take 1 tablet (5 mg total) by mouth once daily., Disp: 90 tablet, Rfl: 1    loratadine (CLARITIN) 10 mg tablet, Take 10 mg by mouth once daily., Disp: , Rfl:     metoprolol succinate (TOPROL-XL) 50 MG 24 hr tablet, Take 1 tablet (50 mg total) by mouth once daily., Disp: 90 tablet, Rfl: 1    multivitamin (ONE DAILY MULTIVITAMIN) per tablet, Take 1 tablet by mouth once daily., Disp: , Rfl:     OLANZapine (ZYPREXA) 20 MG tablet, Take 0.5 tablets by mouth 2 (two) times a day., Disp: , Rfl:     rosuvastatin (CRESTOR) 20 MG tablet, Take 1 tablet (20 mg total) by mouth once daily., Disp: 90 tablet, Rfl: 1    Review of patient's allergies indicates:  No Known Allergies      Review of Systems:  See HPI      Physical Exam:  BP 97/65 (BP Location: Right arm, Patient Position: Sitting, BP Method: Medium (Automatic))   Pulse 93   Temp 97.5 °F (36.4 °C) (Oral)   Resp 18   Ht 5' 5" (1.651 m)   Wt 57.5 kg (126 lb 12.8 oz)   SpO2 99%   BMI 21.10 kg/m²     Gen- appears comfortable  HEENT- PERRLA, EOM intact.  Bilateral cataracts.  Nares patent without rhinorrhea.  Oropharynx clear without oropharyngeal erythema or edema.  Edentulous  CV- regular rate and rhythm no murmurs rubs or gallops, pulsatile abdominal aorta with " bruit.  Palpable radial and DP pulses bilaterally  Resp- lungs CTA bilaterally  GI- abdomen is soft, nontender, nondistended. Pulsatile abdomen, AA roughly 6 cm, no bruit  - no suprapubic tenderness  Neuro- alert and responding appropriately to questions commands  Skin- No wound or rash      Assessment/Plan:    1. History of abdominal aortic aneurysm (AAA)  - Increased size of AAA, see report from prior US  - discussed importance of smoking cessation, blood pressure control, avoidance of strenuous activity including heavy lifting  - keep scheduled appointment with vascular surgery clinic on 10/24/2023    2. Hyperlipidemia, unspecified hyperlipidemia type  -no recent lab were   -panel today   -Crestor 20 mg refill, continue current dose    3. Hypertension, unspecified type  Blood pressure well controlled   -continue metoprolol succinate 50 mg daily, lisinopril 5 mg daily  -BMP today    4. Peripheral polyneuropathy  - well controlled  - meds reviewed and appropriate, refills as below   - gabapentin (NEURONTIN) 300 MG capsule; Take 1 capsule (300 mg total) by mouth nightly.  Dispense: 90 capsule; Refill: 1    HM  See above  HIV, RPR screening    Rip Ansari MD  [employer] FM Resident HO3

## 2023-10-24 NOTE — PROGRESS NOTES
[facility] GENERAL SURGERY   Clinic Note     HPI:   Red Aaron Jr. is a 68 y.o. male with PMHx of HTN, dementia, HLD, neuropathy who presents to clinic for evaluation of AAA. Abdominal US 8/31/2023 showed infrarenal abdominal aortic aneurysm measuring up to 6.4 cm. He reports intermittent mild flank pain bilaterally. He denies chest pain, SOB, dizziness, abdominal pain, leg pain, leg numbness. He reports he is able to walk long distances without leg pain or dizziness. He has cut down to smoking 3-4 cigarettes per day. No surgical history.    Review of Systems:  10 point review of systems denied unless otherwise indicated above HPI    Allergies:   Review of patient's allergies indicates:  No Known Allergies    Meds:     Current Outpatient Medications:     aspirin 81 MG Chew, Take 81 mg by mouth once daily., Disp: , Rfl:     benztropine (COGENTIN) 1 MG tablet, Take 0.5 mg by mouth 2 (two) times a day., Disp: , Rfl:     gabapentin (NEURONTIN) 300 MG capsule, Take 1 capsule (300 mg total) by mouth nightly., Disp: 90 capsule, Rfl: 1    haloperidol decanoate (HALDOL DECANOATE) 100 mg/mL injection, Inject 1.5 mLs into the muscle every 21 days., Disp: , Rfl:     lisinopriL (PRINIVIL,ZESTRIL) 5 MG Tab, Take 1 tablet (5 mg total) by mouth once daily., Disp: 90 tablet, Rfl: 1    loratadine (CLARITIN) 10 mg tablet, Take 10 mg by mouth once daily., Disp: , Rfl:     metoprolol succinate (TOPROL-XL) 50 MG 24 hr tablet, Take 1 tablet (50 mg total) by mouth once daily., Disp: 90 tablet, Rfl: 1    multivitamin (ONE DAILY MULTIVITAMIN) per tablet, Take 1 tablet by mouth once daily., Disp: , Rfl:     OLANZapine (ZYPREXA) 20 MG tablet, Take 0.5 tablets by mouth 2 (two) times a day., Disp: , Rfl:     rosuvastatin (CRESTOR) 20 MG tablet, Take 1 tablet (20 mg total) by mouth once daily., Disp: 90 tablet, Rfl: 1    PMH:   Past Medical History:   Diagnosis Date    Hyperlipidemia     Hypertension     PTSD (post-traumatic stress disorder)      Schizophrenia         Social History:   Social History     Tobacco Use    Smoking status: Every Day     Current packs/day: 0.50     Average packs/day: 0.5 packs/day for 50.8 years (25.4 ttl pk-yrs)     Types: Cigarettes     Start date: 1973    Smokeless tobacco: Former     Types: Chew   Substance Use Topics    Alcohol use: Not Currently     Alcohol/week: 1.0 standard drink of alcohol     Types: 1 Glasses of wine per week    Drug use: Not Currently     Frequency: 2.0 times per week     Types: Marijuana       Family History:   History reviewed. No pertinent family history.    Surgical History:   Past Surgical History:   Procedure Laterality Date    TONSILLECTOMY         Objective:  Vitals:  Vitals:    10/24/23 1524   BP: 127/82   Pulse: 74   Resp: 19   Temp: 98.2 °F (36.8 °C)        Physical Exam:  Gen: NAD  Neuro: awake, alert, answering questions appropriately  CV: RRR  Resp: non-labored breathing  Abd: soft, ND, NT; palpable abdominal aortic aneurysm on abdominal exam; pulsatile abdominal mass visible when patient lies flat  Ext: moves all 4 spontaneously and purposefully; 2+ femoral pulses bilaterally   Skin: warm, well perfused    Pertinent Labs:  No new pertinent labs    Imaging:  Abdominal US 8/31/2023  Infrarenal abdominal aortic aneurysm measuring up to 6.4 cm.  Previously measured up to 5.2 cm.    Assessment/Plan:  Red Aaron Jr. is a 68 y.o. with PMHx of HTN, dementia, HLD, neuropathy who presents to clinic for evaluation of AAA. Abdominal US 8/31/2023 showed infrarenal abdominal aortic aneurysm measuring up to 6.4 cm. Patient exceeds size criteria for repair but need further imaging prior to endovascular or open repair.    - ordered CTA stat   - RTC with CTA as soon as possible     Anita Montilla, MS3  [facility]  10/24/2023 4:03 PM      PGY-1 Attestation:  Patient seen and examined with student doctor. Student doctor note reviewed and edited as necessary. Agree with  plan as above.    Moses Martinez MD  [facility] General Surgery PGY-1

## 2023-11-02 NOTE — PROGRESS NOTES
Faculty addendum: Patient discussed with resident. Chart was reviewed including vitals, labs, etc. Care provided reasonable and necessary. I participated in the management of the patient and was immediately available throughout the encounter. Services were furnished in a primary care center located in the outpatient department of a [facility] hospital. I agree with the resident's findings and plan as documented in the resident's note.

## 2023-11-21 NOTE — PROGRESS NOTES
I have reviewed the notes, assessments, and/or procedures performed this visit, and I concur with the documentation.  PT requires CTA for full anatomic evaluation of AAA and RTC asap after imaging.

## 2023-11-22 NOTE — PROGRESS NOTES
Population Health Outreach.   Patient called my number in Population Health by mistake, trying to verify it he had a CT scan today.  Per Snap shot appt. he has a CT [facility] 11:[facility].  Pt. Informed and verified understanding.

## 2023-12-05 PROBLEM — I71.40 ABDOMINAL AORTIC ANEURYSM (AAA) WITHOUT RUPTURE: Status: ACTIVE | Noted: 2023-01-01

## 2023-12-05 NOTE — PROGRESS NOTES
[facility] General Surgery Clinic Note    HPI: Red Aaron is a 68 y.o. patient with a history of HTN, hyperlipidemia, schizophrenia and PTSD presenting to clinic for 2 week follow-up after CT Abdomen/Pelvic/bilateral LE. CT observed AAA size increase to 6.7x7cm from 5.5x6.4cm on abdominal US (08/31/23). Patient denies any dizziness, headaches, syncopal episodes, SOB, cramping or weakness of extremities. No difficulty with walking long periods of time or distance. Per family patient experienced abdominal pain around 2 weeks ago which they treated with aspirin as well as cold/hot compresses. Patient is also concerned about significant weight loss of 7-8 pounds in the last month and a half without decrease in appetite. Reports smoking 9 cigarettes a week, but no alcohol or other substance use. Family requested to have AAA surgery after the holidays and after his cataract surgery in January.      PMH:   Past Medical History:   Diagnosis Date    Hyperlipidemia     Hypertension     PTSD (post-traumatic stress disorder)     Schizophrenia       Meds:   Current Outpatient Medications:     aspirin 81 MG Chew, Take 81 mg by mouth once daily., Disp: , Rfl:     benztropine (COGENTIN) 1 MG tablet, Take 0.5 mg by mouth 2 (two) times a day., Disp: , Rfl:     gabapentin (NEURONTIN) 300 MG capsule, Take 1 capsule (300 mg total) by mouth nightly., Disp: 90 capsule, Rfl: 1    haloperidol decanoate (HALDOL DECANOATE) 100 mg/mL injection, Inject 1.5 mLs into the muscle every 21 days., Disp: , Rfl:     lisinopriL (PRINIVIL,ZESTRIL) 5 MG Tab, Take 1 tablet (5 mg total) by mouth once daily., Disp: 90 tablet, Rfl: 1    loratadine (CLARITIN) 10 mg tablet, Take 10 mg by mouth once daily., Disp: , Rfl:     metoprolol succinate (TOPROL-XL) 50 MG 24 hr tablet, Take 1 tablet (50 mg total) by mouth once daily., Disp: 90 tablet, Rfl: 1    multivitamin (ONE DAILY MULTIVITAMIN) per tablet, Take 1 tablet by mouth once daily., Disp: , Rfl:      OLANZapine (ZYPREXA) 20 MG tablet, Take 0.5 tablets by mouth 2 (two) times a day., Disp: , Rfl:     rosuvastatin (CRESTOR) 20 MG tablet, Take 1 tablet (20 mg total) by mouth once daily., Disp: 90 tablet, Rfl: 1  Allergies: Review of patient's allergies indicates:  No Known Allergies  Social History:   Social History     Tobacco Use    Smoking status: Every Day     Current packs/day: 0.50     Average packs/day: 0.5 packs/day for 50.9 years (25.5 ttl pk-yrs)     Types: Cigarettes     Start date: 1973    Smokeless tobacco: Former     Types: Chew   Substance Use Topics    Alcohol use: Not Currently     Alcohol/week: 1.0 standard drink of alcohol     Types: 1 Glasses of wine per week    Drug use: Not Currently     Frequency: 2.0 times per week     Types: Marijuana     Family History: No family history on file.  Surgical History:   Past Surgical History:   Procedure Laterality Date    TONSILLECTOMY       Review of Systems:  Negative unless specified in HPI above.    Objective:    Vitals:  There were no vitals filed for this visit.     Physical Exam:  Gen: NAD  Neuro: awake, alert, answering questions appropriately  CV: RRR, 2+ radial, femoral, popliteal pulses, 1+ DP and PT pulses, No carotid bruits auscultated.   Resp: non-labored breathing, MENDY  Abd: soft, ND, NT. Mid abdominal palpable pulse.  Ext: moves all 4 spontaneously and purposefully  Skin: warm, well perfused    Pertinent Labs:  ISTAT Creatinine (11/22/23): 1.5    Imaging:  CTA runoff ABD Pel Bilat Lower Ext (11/22/23):  -There is an abdominal aortic aneurysm seen.  The aneurysm is infrarenal and fusiform in appearance.  Maximum thickness of the aneurysm is 6.7 x 7 cm.  There is a significant amount of mural thrombus.  The celiac axis is patent with some calcified plaque at the origin and approximately a 40% stenosis.  The superior mesenteric artery is patent.  No significant stenosis is seen.  There are single renal arteries bilaterally.  No significant renal  artery stenosis is seen.  The CHEYANNE is not patent.     Aneurysmal dilatation extends into the common iliac artery on the right side.  Maximum size of the common iliac artery on the right side is 2.9 cm.  The right external iliac artery is patent with mild amount atherosclerotic plaque.  No high-grade stenosis is seen.  The internal iliac artery on the right side appears to be occluded.  Some collateral flow is seen in the right pelvis from the left iliac arterial system.  There is evidence of high-grade stenosis in the common iliac artery on the left side with areas of stenosis measuring 60 is 70% due to atherosclerotic plaque.  External iliac artery on the left side has multifocal areas of stenosis measuring 40-50%.  It is patent.     Common femoral artery on the right side is patent with a 40% stenosis secondary to atherosclerotic plaque.  Multifocal areas of plaque are seen scattered throughout the superficial femoral artery on the right side with areas stenosing measuring up to 50-60%.  There is some calcified plaque at the popliteal artery on the right side with high-grade area of stenosis measuring approximately 80% at the level of the knee.  There is calcified plaque at the tibioperoneal trunk but there is three-vessel runoff to the right lower extremity.     The left common femoral artery has some calcified plaque within it with areas of stenosis measuring approximately 40%.  There is 80% stenosis of the proximal superficial femoral artery on the left side with multifocal areas of plaque seen throughout the superficial femoral artery on the left side some measuring 60-70%.  There is calcified plaque in the popliteal artery on the left side with a 70-80% stenosis at the level of the knee.  Calcified plaque is seen in the tibioperoneal trunk but there is three-vessel runoff seen.     Source images: There are changes consistent with COPD and emphysema in the lung bases bilaterally.  The liver appears normal.  No  liver mass or lesion is seen. Gallbladder appears normal. The pancreas appears grossly unremarkable. Spleen appears normal.  The adrenal glands appear normal.  No renal abnormality seen. Visualized portions of the bowel appear grossly unremarkable. Urinary bladder appears normal.    US abdomen (8/31/23):  Transabdominal scanning targeting the aorta.  The abdominal aorta measures up to 2.1 cm proximally, 5.5 cm in its midportion and 6.4 cm distally.  On the prior CT the abdominal aorta measured up to 5.2 cm distally.  Dense atherosclerotic disease within the aorta.     Micro/Path/Other:  None    Assessment/Plan:  Red Aaron is a 68 y.o. patient with a history of HTN, hyperlipidemia, schizophrenia and PTSD presenting to clinic for 2 week follow-up after CT Abdomen/Pelvic/bilateral LE. CT observed AAA size of 6.7x7cm, an increase from 5.5x6.4cm on abdominal US (08/31/23).    -Schedule AAA repair at [facility] 01/11/24    [facility] General Surgery MS3  12/05/2023 12:37 PM     RESIDENT ATTESTATION    I have seen and  evaluated the patient with the student doctor. Agree with assessment and plan with following changes:    Assessment/Plan:  Red Aaron Jr. is a 68 y.o. male with PMHx of HTN, HLD, PTSD, and schizophrenia who presented to clinic for follow up of a AAA after CT Abdomen/pelvis. Abdominal U/s (8/31) showing infrarenal AAA 6.4x5.5 cm, follow up CT abdomen/pelvis (11/22) showing AAA now 6.7x7.0 cm. Patient denies abdominal pain, CP, SOB, claudication, or peripheral neuropathy. Discussed risks and benefits of EVAR vs open aneurysm repair and patient is amenable to open repair. Will schedule for open AAA repair at [facility] for 01/11/2024.    Billy Briggs MD  [facility] General Surgery PGY-1  1:46 PM

## 2023-12-05 NOTE — PROGRESS NOTES
Patient seen by Dr. Gatica. Patient will receive a call form [facility] with SX date, time, and instructions.  Discharge instructions given. Consent to be sign morning of surgery per Dr. Gatica.

## 2023-12-11 NOTE — PROGRESS NOTES
I have reviewed the notes, assessments, and/or procedures performed by the resident, I concur with her/his documentation of Red Aaron Jr..  Date of Service: 12/5/2023    The patient has a large infrarenal aortic aneurysm.  The aneurysm has been increasing in size since last evaluation.  The patient is currently asymptomatic.  I do not think the patient is a good candidate for an endovascular repair because of a short neck.  I recommend open direct repair of the aneurysm.  We are unable to perform this procedure at [facility] because of lack of cell Saver and need for an intensive care unit that could handle this level acuity of patient care.  I will schedule the patient at [facility] for 11 January 2024.  The procedure was discussed in detail including the risks benefits and alternative therapies.  The patient agrees and wishes to proceed.

## 2024-01-01 ENCOUNTER — PATIENT OUTREACH (OUTPATIENT)
Dept: ADMINISTRATIVE | Facility: CLINIC | Age: 69
End: 2024-01-01
Payer: OTHER GOVERNMENT

## 2024-01-01 ENCOUNTER — HOSPITAL ENCOUNTER (EMERGENCY)
Facility: HOSPITAL | Age: 69
End: 2024-02-13
Attending: STUDENT IN AN ORGANIZED HEALTH CARE EDUCATION/TRAINING PROGRAM
Payer: OTHER GOVERNMENT

## 2024-01-01 ENCOUNTER — ANESTHESIA (OUTPATIENT)
Dept: SURGERY | Facility: HOSPITAL | Age: 69
DRG: 268 | End: 2024-01-01
Payer: OTHER GOVERNMENT

## 2024-01-01 ENCOUNTER — HOSPITAL ENCOUNTER (INPATIENT)
Facility: HOSPITAL | Age: 69
LOS: 8 days | Discharge: HOME HEALTH | DRG: 268 | End: 2024-01-19
Attending: SPECIALIST | Admitting: HOSPITALIST
Payer: OTHER GOVERNMENT

## 2024-01-01 ENCOUNTER — OFFICE VISIT (OUTPATIENT)
Dept: FAMILY MEDICINE | Facility: CLINIC | Age: 69
End: 2024-01-01
Payer: OTHER GOVERNMENT

## 2024-01-01 ENCOUNTER — ANESTHESIA EVENT (OUTPATIENT)
Dept: SURGERY | Facility: HOSPITAL | Age: 69
DRG: 268 | End: 2024-01-01
Payer: OTHER GOVERNMENT

## 2024-01-01 ENCOUNTER — HOSPITAL ENCOUNTER (OUTPATIENT)
Dept: RADIOLOGY | Facility: HOSPITAL | Age: 69
Discharge: HOME | DRG: 268 | End: 2024-01-10
Attending: NURSE PRACTITIONER
Payer: OTHER GOVERNMENT

## 2024-01-01 VITALS
TEMPERATURE: 99 F | SYSTOLIC BLOOD PRESSURE: 101 MMHG | HEIGHT: 67 IN | BODY MASS INDEX: 18.89 KG/M2 | DIASTOLIC BLOOD PRESSURE: 65 MMHG | HEART RATE: 104 BPM | WEIGHT: 120.38 LBS

## 2024-01-01 VITALS
SYSTOLIC BLOOD PRESSURE: 105 MMHG | OXYGEN SATURATION: 99 % | TEMPERATURE: 99 F | BODY MASS INDEX: 19.35 KG/M2 | HEIGHT: 67 IN | WEIGHT: 123.25 LBS | RESPIRATION RATE: 18 BRPM | DIASTOLIC BLOOD PRESSURE: 64 MMHG | HEART RATE: 100 BPM

## 2024-01-01 VITALS
DIASTOLIC BLOOD PRESSURE: 24 MMHG | RESPIRATION RATE: 20 BRPM | SYSTOLIC BLOOD PRESSURE: 53 MMHG | OXYGEN SATURATION: 51 %

## 2024-01-01 DIAGNOSIS — I10 PRIMARY HYPERTENSION: ICD-10-CM

## 2024-01-01 DIAGNOSIS — I46.9 CARDIAC ARREST: Primary | ICD-10-CM

## 2024-01-01 DIAGNOSIS — Z01.818 PREOP TESTING: ICD-10-CM

## 2024-01-01 DIAGNOSIS — Z98.890 S/P AAA REPAIR: ICD-10-CM

## 2024-01-01 DIAGNOSIS — Z86.79 S/P AAA REPAIR: ICD-10-CM

## 2024-01-01 DIAGNOSIS — I71.40 ABDOMINAL AORTIC ANEURYSM (AAA) WITHOUT RUPTURE, UNSPECIFIED PART: Primary | ICD-10-CM

## 2024-01-01 DIAGNOSIS — R19.7 DIARRHEA, UNSPECIFIED TYPE: ICD-10-CM

## 2024-01-01 DIAGNOSIS — I71.40 AAA (ABDOMINAL AORTIC ANEURYSM): ICD-10-CM

## 2024-01-01 DIAGNOSIS — Z23 IMMUNIZATION DUE: Primary | ICD-10-CM

## 2024-01-01 DIAGNOSIS — I71.43 INFRARENAL ABDOMINAL AORTIC ANEURYSM (AAA) WITHOUT RUPTURE: Primary | ICD-10-CM

## 2024-01-01 DIAGNOSIS — I95.9 HYPOTENSION: ICD-10-CM

## 2024-01-01 DIAGNOSIS — Z09 HOSPITAL DISCHARGE FOLLOW-UP: ICD-10-CM

## 2024-01-01 DIAGNOSIS — F43.10 POSTTRAUMATIC STRESS DISORDER: ICD-10-CM

## 2024-01-01 DIAGNOSIS — I25.10 ARTERIOSCLEROSIS OF CORONARY ARTERY: ICD-10-CM

## 2024-01-01 LAB
ABO + RH BLD: NORMAL
ABORH RETYPE: NORMAL
ALBUMIN SERPL-MCNC: 1.3 G/DL (ref 3.4–4.8)
ALBUMIN SERPL-MCNC: 2 G/DL (ref 3.4–4.8)
ALBUMIN SERPL-MCNC: 2.2 G/DL (ref 3.4–4.8)
ALBUMIN SERPL-MCNC: 2.3 G/DL (ref 3.4–4.8)
ALBUMIN SERPL-MCNC: 2.6 G/DL (ref 3.4–4.8)
ALBUMIN SERPL-MCNC: 3.2 G/DL (ref 3.4–4.8)
ALBUMIN/GLOB SERPL: 0.4 RATIO (ref 1.1–2)
ALBUMIN/GLOB SERPL: 0.7 RATIO (ref 1.1–2)
ALBUMIN/GLOB SERPL: 0.7 RATIO (ref 1.1–2)
ALBUMIN/GLOB SERPL: 1.5 RATIO (ref 1.1–2)
ALLENS TEST BLOOD GAS (OHS): ABNORMAL
ALLENS TEST BLOOD GAS (OHS): ABNORMAL
ALLENS TEST BLOOD GAS (OHS): NO
ALP SERPL-CCNC: 106 UNIT/L (ref 40–150)
ALP SERPL-CCNC: 34 UNIT/L (ref 40–150)
ALP SERPL-CCNC: 39 UNIT/L (ref 40–150)
ALP SERPL-CCNC: 44 UNIT/L (ref 40–150)
ALP SERPL-CCNC: 51 UNIT/L (ref 40–150)
ALP SERPL-CCNC: 56 UNIT/L (ref 40–150)
ALT SERPL-CCNC: 22 UNIT/L (ref 0–55)
ALT SERPL-CCNC: 32 UNIT/L (ref 0–55)
ALT SERPL-CCNC: 5 UNIT/L (ref 0–55)
ALT SERPL-CCNC: 7 UNIT/L (ref 0–55)
ALT SERPL-CCNC: 9 UNIT/L (ref 0–55)
ALT SERPL-CCNC: 982 UNIT/L (ref 0–55)
ANION GAP SERPL CALC-SCNC: 6 MEQ/L
ANION GAP SERPL CALC-SCNC: 7 MEQ/L
ANION GAP SERPL CALC-SCNC: 8 MEQ/L
AST SERPL-CCNC: 16 UNIT/L (ref 5–34)
AST SERPL-CCNC: 17 UNIT/L (ref 5–34)
AST SERPL-CCNC: 1708 UNIT/L (ref 5–34)
AST SERPL-CCNC: 20 UNIT/L (ref 5–34)
AST SERPL-CCNC: 30 UNIT/L (ref 5–34)
AST SERPL-CCNC: 50 UNIT/L (ref 5–34)
BACTERIA SPEC CULT: NORMAL
BASE EXCESS BLD CALC-SCNC: -1.5 MMOL/L
BASE EXCESS BLD CALC-SCNC: -1.7 MMOL/L (ref -2–2)
BASE EXCESS BLD CALC-SCNC: 0.3 MMOL/L
BASOPHILS # BLD AUTO: 0.02 X10(3)/MCL
BASOPHILS # BLD AUTO: 0.03 X10(3)/MCL
BASOPHILS # BLD AUTO: 0.04 X10(3)/MCL
BASOPHILS # BLD AUTO: 0.04 X10(3)/MCL
BASOPHILS # BLD AUTO: 0.05 X10(3)/MCL
BASOPHILS NFR BLD AUTO: 0.2 %
BASOPHILS NFR BLD AUTO: 0.3 %
BASOPHILS NFR BLD AUTO: 0.3 %
BASOPHILS NFR BLD AUTO: 0.4 %
BILIRUB SERPL-MCNC: 0.3 MG/DL
BILIRUB SERPL-MCNC: 0.3 MG/DL
BILIRUB SERPL-MCNC: 0.5 MG/DL
BILIRUB SERPL-MCNC: 0.5 MG/DL
BILIRUB SERPL-MCNC: 0.6 MG/DL
BILIRUB SERPL-MCNC: 0.8 MG/DL
BLD PROD TYP BPU: NORMAL
BLOOD GAS SAMPLE TYPE (OHS): ABNORMAL
BLOOD UNIT EXPIRATION DATE: NORMAL
BLOOD UNIT TYPE CODE: 6200
BUN SERPL-MCNC: 11 MG/DL (ref 8.4–25.7)
BUN SERPL-MCNC: 11.1 MG/DL (ref 8.4–25.7)
BUN SERPL-MCNC: 11.7 MG/DL (ref 8.4–25.7)
BUN SERPL-MCNC: 12.6 MG/DL (ref 8.4–25.7)
BUN SERPL-MCNC: 14 MG/DL (ref 8.4–25.7)
BUN SERPL-MCNC: 33.2 MG/DL (ref 8.4–25.7)
BUN SERPL-MCNC: 7.8 MG/DL (ref 8.4–25.7)
BUN SERPL-MCNC: 8 MG/DL (ref 8.4–25.7)
BUN SERPL-MCNC: 9.3 MG/DL (ref 8.4–25.7)
C DIFF TOX A+B STL QL IA: NEGATIVE
CA-I BLD-SCNC: 1.13 MMOL/L (ref 1.12–1.23)
CA-I BLD-SCNC: 1.16 MMOL/L (ref 1.12–1.23)
CA-I BLD-SCNC: 1.27 MMOL/L (ref 1.12–1.23)
CALCIUM SERPL-MCNC: 10.2 MG/DL (ref 8.8–10)
CALCIUM SERPL-MCNC: 6.2 MG/DL (ref 8.8–10)
CALCIUM SERPL-MCNC: 7.5 MG/DL (ref 8.8–10)
CALCIUM SERPL-MCNC: 7.7 MG/DL (ref 8.8–10)
CALCIUM SERPL-MCNC: 8 MG/DL (ref 8.8–10)
CALCIUM SERPL-MCNC: 8.2 MG/DL (ref 8.8–10)
CALCIUM SERPL-MCNC: 8.8 MG/DL (ref 8.8–10)
CHLORIDE SERPL-SCNC: 100 MMOL/L (ref 98–107)
CHLORIDE SERPL-SCNC: 103 MMOL/L (ref 98–107)
CHLORIDE SERPL-SCNC: 105 MMOL/L (ref 98–107)
CHLORIDE SERPL-SCNC: 105 MMOL/L (ref 98–107)
CHLORIDE SERPL-SCNC: 106 MMOL/L (ref 98–107)
CHLORIDE SERPL-SCNC: 107 MMOL/L (ref 98–107)
CHLORIDE SERPL-SCNC: 113 MMOL/L (ref 98–107)
CHLORIDE SERPL-SCNC: 121 MMOL/L (ref 98–107)
CHLORIDE SERPL-SCNC: 99 MMOL/L (ref 98–107)
CLOSTRIDIUM DIFFICILE GDH ANTIGEN (OHS): NEGATIVE
CO2 BLDA-SCNC: 25.7 MMOL/L
CO2 BLDA-SCNC: 25.8 MMOL/L
CO2 BLDA-SCNC: 26.2 MMOL/L
CO2 SERPL-SCNC: 15 MMOL/L (ref 23–31)
CO2 SERPL-SCNC: 17 MMOL/L (ref 23–31)
CO2 SERPL-SCNC: 21 MMOL/L (ref 23–31)
CO2 SERPL-SCNC: 22 MMOL/L (ref 23–31)
CO2 SERPL-SCNC: 22 MMOL/L (ref 23–31)
CO2 SERPL-SCNC: 23 MMOL/L (ref 23–31)
CO2 SERPL-SCNC: 24 MMOL/L (ref 23–31)
CO2 SERPL-SCNC: 24 MMOL/L (ref 23–31)
CO2 SERPL-SCNC: 26 MMOL/L (ref 23–31)
COHGB MFR BLDA: 2.5 % (ref 0.5–1.5)
CREAT SERPL-MCNC: 0.67 MG/DL (ref 0.73–1.18)
CREAT SERPL-MCNC: 0.68 MG/DL (ref 0.73–1.18)
CREAT SERPL-MCNC: 0.77 MG/DL (ref 0.73–1.18)
CREAT SERPL-MCNC: 0.78 MG/DL (ref 0.73–1.18)
CREAT SERPL-MCNC: 0.84 MG/DL (ref 0.73–1.18)
CREAT SERPL-MCNC: 0.9 MG/DL (ref 0.73–1.18)
CREAT SERPL-MCNC: 1.11 MG/DL (ref 0.73–1.18)
CREAT SERPL-MCNC: 1.16 MG/DL (ref 0.73–1.18)
CREAT SERPL-MCNC: 2.97 MG/DL (ref 0.73–1.18)
CREAT/UREA NIT SERPL: 12
CREAT/UREA NIT SERPL: 12
CREAT/UREA NIT SERPL: 15
CROSSMATCH INTERPRETATION: NORMAL
DISPENSE STATUS: NORMAL
DRAWN BY BLOOD GAS (OHS): ABNORMAL
EOSINOPHIL # BLD AUTO: 0 X10(3)/MCL (ref 0–0.9)
EOSINOPHIL # BLD AUTO: 0.01 X10(3)/MCL (ref 0–0.9)
EOSINOPHIL # BLD AUTO: 0.07 X10(3)/MCL (ref 0–0.9)
EOSINOPHIL # BLD AUTO: 0.07 X10(3)/MCL (ref 0–0.9)
EOSINOPHIL # BLD AUTO: 0.13 X10(3)/MCL (ref 0–0.9)
EOSINOPHIL # BLD AUTO: 0.23 X10(3)/MCL (ref 0–0.9)
EOSINOPHIL NFR BLD AUTO: 0 %
EOSINOPHIL NFR BLD AUTO: 0.1 %
EOSINOPHIL NFR BLD AUTO: 0.5 %
EOSINOPHIL NFR BLD AUTO: 0.8 %
EOSINOPHIL NFR BLD AUTO: 1.1 %
EOSINOPHIL NFR BLD AUTO: 1.7 %
ERYTHROCYTE [DISTWIDTH] IN BLOOD BY AUTOMATED COUNT: 14.6 % (ref 11.5–17)
ERYTHROCYTE [DISTWIDTH] IN BLOOD BY AUTOMATED COUNT: 14.6 % (ref 11.5–17)
ERYTHROCYTE [DISTWIDTH] IN BLOOD BY AUTOMATED COUNT: 14.7 % (ref 11.5–17)
ERYTHROCYTE [DISTWIDTH] IN BLOOD BY AUTOMATED COUNT: 14.8 % (ref 11.5–17)
ERYTHROCYTE [DISTWIDTH] IN BLOOD BY AUTOMATED COUNT: 14.9 % (ref 11.5–17)
ERYTHROCYTE [DISTWIDTH] IN BLOOD BY AUTOMATED COUNT: 14.9 % (ref 11.5–17)
ERYTHROCYTE [DISTWIDTH] IN BLOOD BY AUTOMATED COUNT: 15.2 % (ref 11.5–17)
ERYTHROCYTE [DISTWIDTH] IN BLOOD BY AUTOMATED COUNT: 15.5 % (ref 11.5–17)
ERYTHROCYTE [DISTWIDTH] IN BLOOD BY AUTOMATED COUNT: 15.7 % (ref 11.5–17)
ERYTHROCYTE [DISTWIDTH] IN BLOOD BY AUTOMATED COUNT: 16.1 % (ref 11.5–17)
FIO2: 100
FIO2: 100
GFR SERPLBLD CREATININE-BSD FMLA CKD-EPI: 22 MLS/MIN/1.73/M2
GFR SERPLBLD CREATININE-BSD FMLA CKD-EPI: >60 MLS/MIN/1.73/M2
GLOBULIN SER-MCNC: 1.5 GM/DL (ref 2.4–3.5)
GLOBULIN SER-MCNC: 1.7 GM/DL (ref 2.4–3.5)
GLOBULIN SER-MCNC: 2.2 GM/DL (ref 2.4–3.5)
GLOBULIN SER-MCNC: 2.7 GM/DL (ref 2.4–3.5)
GLOBULIN SER-MCNC: 3.1 GM/DL (ref 2.4–3.5)
GLOBULIN SER-MCNC: 3.3 GM/DL (ref 2.4–3.5)
GLUCOSE SERPL-MCNC: 105 MG/DL (ref 82–115)
GLUCOSE SERPL-MCNC: 114 MG/DL (ref 82–115)
GLUCOSE SERPL-MCNC: 117 MG/DL (ref 70–110)
GLUCOSE SERPL-MCNC: 130 MG/DL (ref 70–110)
GLUCOSE SERPL-MCNC: 130 MG/DL (ref 82–115)
GLUCOSE SERPL-MCNC: 132 MG/DL (ref 82–115)
GLUCOSE SERPL-MCNC: 138 MG/DL (ref 82–115)
GLUCOSE SERPL-MCNC: 166 MG/DL (ref 82–115)
GLUCOSE SERPL-MCNC: 52 MG/DL (ref 82–115)
GLUCOSE SERPL-MCNC: 95 MG/DL (ref 82–115)
GLUCOSE SERPL-MCNC: 98 MG/DL (ref 82–115)
GROUP & RH: NORMAL
HCO3 BLDA-SCNC: 24.3 MMOL/L (ref 22–26)
HCO3 BLDA-SCNC: 24.6 MMOL/L (ref 22–26)
HCO3 BLDA-SCNC: 24.7 MMOL/L (ref 22–26)
HCO3 UR-SCNC: 23 MMOL/L (ref 24–28)
HCO3 UR-SCNC: 24.8 MMOL/L (ref 24–28)
HCT VFR BLD AUTO: 25.5 % (ref 42–52)
HCT VFR BLD AUTO: 25.6 % (ref 42–52)
HCT VFR BLD AUTO: 26.8 % (ref 42–52)
HCT VFR BLD AUTO: 26.9 % (ref 42–52)
HCT VFR BLD AUTO: 27.6 % (ref 42–52)
HCT VFR BLD AUTO: 28.4 % (ref 42–52)
HCT VFR BLD AUTO: 29 % (ref 42–52)
HCT VFR BLD AUTO: 29.1 % (ref 42–52)
HCT VFR BLD AUTO: 29.4 % (ref 42–52)
HCT VFR BLD AUTO: 29.6 % (ref 42–52)
HCT VFR BLD AUTO: 29.8 % (ref 42–52)
HCT VFR BLD AUTO: 29.8 % (ref 42–52)
HCT VFR BLD AUTO: 31 % (ref 42–52)
HCT VFR BLD AUTO: 32.3 % (ref 42–52)
HCT VFR BLD AUTO: 35.5 % (ref 42–52)
HCT VFR BLD AUTO: 36.3 % (ref 42–52)
HCT VFR BLD CALC: 23 %PCV (ref 36–54)
HCT VFR BLD CALC: 25 %PCV (ref 36–54)
HGB BLD-MCNC: 10.2 G/DL (ref 14–18)
HGB BLD-MCNC: 10.2 G/DL (ref 14–18)
HGB BLD-MCNC: 10.3 G/DL (ref 14–18)
HGB BLD-MCNC: 10.8 G/DL (ref 14–18)
HGB BLD-MCNC: 11.7 G/DL (ref 14–18)
HGB BLD-MCNC: 11.8 G/DL (ref 14–18)
HGB BLD-MCNC: 8 G/DL
HGB BLD-MCNC: 8.1 G/DL (ref 14–18)
HGB BLD-MCNC: 8.2 G/DL (ref 14–18)
HGB BLD-MCNC: 8.5 G/DL (ref 14–18)
HGB BLD-MCNC: 8.7 G/DL (ref 14–18)
HGB BLD-MCNC: 8.9 G/DL (ref 14–18)
HGB BLD-MCNC: 9 G/DL
HGB BLD-MCNC: 9.6 G/DL (ref 14–18)
HGB BLD-MCNC: 9.6 G/DL (ref 14–18)
HGB BLD-MCNC: 9.7 G/DL (ref 14–18)
IMM GRANULOCYTES # BLD AUTO: 0.03 X10(3)/MCL (ref 0–0.04)
IMM GRANULOCYTES # BLD AUTO: 0.04 X10(3)/MCL (ref 0–0.04)
IMM GRANULOCYTES # BLD AUTO: 0.05 X10(3)/MCL (ref 0–0.04)
IMM GRANULOCYTES # BLD AUTO: 0.08 X10(3)/MCL (ref 0–0.04)
IMM GRANULOCYTES # BLD AUTO: 0.09 X10(3)/MCL (ref 0–0.04)
IMM GRANULOCYTES NFR BLD AUTO: 0.3 %
IMM GRANULOCYTES NFR BLD AUTO: 0.4 %
IMM GRANULOCYTES NFR BLD AUTO: 0.6 %
IMM GRANULOCYTES NFR BLD AUTO: 0.7 %
INDIRECT COOMBS: NORMAL
INHALED O2 CONCENTRATION: 30 %
ITIME (SEC) (OHS): 1 SEC
LACTATE SERPL-SCNC: 1.3 MMOL/L (ref 0.5–2.2)
LYMPHOCYTES # BLD AUTO: 1.13 X10(3)/MCL (ref 0.6–4.6)
LYMPHOCYTES # BLD AUTO: 1.23 X10(3)/MCL (ref 0.6–4.6)
LYMPHOCYTES # BLD AUTO: 1.34 X10(3)/MCL (ref 0.6–4.6)
LYMPHOCYTES # BLD AUTO: 1.44 X10(3)/MCL (ref 0.6–4.6)
LYMPHOCYTES # BLD AUTO: 1.59 X10(3)/MCL (ref 0.6–4.6)
LYMPHOCYTES # BLD AUTO: 1.83 X10(3)/MCL (ref 0.6–4.6)
LYMPHOCYTES # BLD AUTO: 1.99 X10(3)/MCL (ref 0.6–4.6)
LYMPHOCYTES # BLD AUTO: 2.06 X10(3)/MCL (ref 0.6–4.6)
LYMPHOCYTES NFR BLD AUTO: 11.3 %
LYMPHOCYTES NFR BLD AUTO: 12 %
LYMPHOCYTES NFR BLD AUTO: 13.7 %
LYMPHOCYTES NFR BLD AUTO: 14.9 %
LYMPHOCYTES NFR BLD AUTO: 15.2 %
LYMPHOCYTES NFR BLD AUTO: 16.2 %
LYMPHOCYTES NFR BLD AUTO: 8.6 %
LYMPHOCYTES NFR BLD AUTO: 8.8 %
MAGNESIUM SERPL-MCNC: 1.4 MG/DL (ref 1.6–2.6)
MAGNESIUM SERPL-MCNC: 1.6 MG/DL (ref 1.6–2.6)
MAGNESIUM SERPL-MCNC: 1.8 MG/DL (ref 1.6–2.6)
MAGNESIUM SERPL-MCNC: 1.9 MG/DL (ref 1.6–2.6)
MAGNESIUM SERPL-MCNC: 2.7 MG/DL (ref 1.6–2.6)
MCH RBC QN AUTO: 29.8 PG (ref 27–31)
MCH RBC QN AUTO: 29.9 PG (ref 27–31)
MCH RBC QN AUTO: 30.1 PG (ref 27–31)
MCH RBC QN AUTO: 30.2 PG (ref 27–31)
MCH RBC QN AUTO: 30.3 PG (ref 27–31)
MCH RBC QN AUTO: 30.3 PG (ref 27–31)
MCH RBC QN AUTO: 30.4 PG (ref 27–31)
MCH RBC QN AUTO: 30.6 PG (ref 27–31)
MCH RBC QN AUTO: 30.7 PG (ref 27–31)
MCH RBC QN AUTO: 30.7 PG (ref 27–31)
MCHC RBC AUTO-ENTMCNC: 31.7 G/DL (ref 33–36)
MCHC RBC AUTO-ENTMCNC: 31.8 G/DL (ref 33–36)
MCHC RBC AUTO-ENTMCNC: 32 G/DL (ref 33–36)
MCHC RBC AUTO-ENTMCNC: 32.2 G/DL (ref 33–36)
MCHC RBC AUTO-ENTMCNC: 32.3 G/DL (ref 33–36)
MCHC RBC AUTO-ENTMCNC: 33 G/DL (ref 33–36)
MCHC RBC AUTO-ENTMCNC: 33 G/DL (ref 33–36)
MCHC RBC AUTO-ENTMCNC: 33.1 G/DL (ref 33–36)
MCHC RBC AUTO-ENTMCNC: 33.4 G/DL (ref 33–36)
MCHC RBC AUTO-ENTMCNC: 34.2 G/DL (ref 33–36)
MCV RBC AUTO: 89.9 FL (ref 80–94)
MCV RBC AUTO: 90 FL (ref 80–94)
MCV RBC AUTO: 91.8 FL (ref 80–94)
MCV RBC AUTO: 92.4 FL (ref 80–94)
MCV RBC AUTO: 93 FL (ref 80–94)
MCV RBC AUTO: 93.1 FL (ref 80–94)
MCV RBC AUTO: 93.6 FL (ref 80–94)
MCV RBC AUTO: 94.1 FL (ref 80–94)
MCV RBC AUTO: 94.4 FL (ref 80–94)
MCV RBC AUTO: 95.5 FL (ref 80–94)
MECH RR (OHS): 12 B/MIN
MECH RR (OHS): 20 B/MIN
MECH RR (OHS): 20 B/MIN
METHGB MFR BLDA: 1.5 % (ref 0.4–1.5)
MODE (OHS): ABNORMAL
MONOCYTES # BLD AUTO: 0.32 X10(3)/MCL (ref 0.1–1.3)
MONOCYTES # BLD AUTO: 0.8 X10(3)/MCL (ref 0.1–1.3)
MONOCYTES # BLD AUTO: 0.8 X10(3)/MCL (ref 0.1–1.3)
MONOCYTES # BLD AUTO: 0.95 X10(3)/MCL (ref 0.1–1.3)
MONOCYTES # BLD AUTO: 0.99 X10(3)/MCL (ref 0.1–1.3)
MONOCYTES # BLD AUTO: 1.02 X10(3)/MCL (ref 0.1–1.3)
MONOCYTES # BLD AUTO: 1.07 X10(3)/MCL (ref 0.1–1.3)
MONOCYTES # BLD AUTO: 1.08 X10(3)/MCL (ref 0.1–1.3)
MONOCYTES NFR BLD AUTO: 3.6 %
MONOCYTES NFR BLD AUTO: 5.9 %
MONOCYTES NFR BLD AUTO: 6 %
MONOCYTES NFR BLD AUTO: 7.5 %
MONOCYTES NFR BLD AUTO: 7.5 %
MONOCYTES NFR BLD AUTO: 7.7 %
MONOCYTES NFR BLD AUTO: 8 %
MONOCYTES NFR BLD AUTO: 8.4 %
NEUTROPHILS # BLD AUTO: 10.28 X10(3)/MCL (ref 2.1–9.2)
NEUTROPHILS # BLD AUTO: 10.36 X10(3)/MCL (ref 2.1–9.2)
NEUTROPHILS # BLD AUTO: 10.5 X10(3)/MCL (ref 2.1–9.2)
NEUTROPHILS # BLD AUTO: 10.51 X10(3)/MCL (ref 2.1–9.2)
NEUTROPHILS # BLD AUTO: 10.63 X10(3)/MCL (ref 2.1–9.2)
NEUTROPHILS # BLD AUTO: 11.91 X10(3)/MCL (ref 2.1–9.2)
NEUTROPHILS # BLD AUTO: 7.02 X10(3)/MCL (ref 2.1–9.2)
NEUTROPHILS # BLD AUTO: 9.34 X10(3)/MCL (ref 2.1–9.2)
NEUTROPHILS NFR BLD AUTO: 77.7 %
NEUTROPHILS NFR BLD AUTO: 77.7 %
NEUTROPHILS NFR BLD AUTO: 78.4 %
NEUTROPHILS NFR BLD AUTO: 78.4 %
NEUTROPHILS NFR BLD AUTO: 78.9 %
NEUTROPHILS NFR BLD AUTO: 78.9 %
NEUTROPHILS NFR BLD AUTO: 81.7 %
NEUTROPHILS NFR BLD AUTO: 83.2 %
NRBC BLD AUTO-RTO: 0 %
O2 HB BLOOD GAS (OHS): 95.5 % (ref 94–97)
OXYGEN DEVICE BLOOD GAS (OHS): ABNORMAL
OXYHGB MFR BLDA: 12 G/DL (ref 12–16)
PCO2 BLDA: 38 MMHG (ref 35–45)
PCO2 BLDA: 40.2 MMHG (ref 35–45)
PCO2 BLDA: 41.4 MMHG (ref 35–45)
PCO2 BLDA: 44 MMHG (ref 35–45)
PCO2 BLDA: 48 MMHG (ref 35–45)
PEEP RESPIRATORY: 5 CMH2O
PH BLDA: 7.32 [PH] (ref 7.35–7.45)
PH BLDA: 7.35 [PH] (ref 7.35–7.45)
PH BLDA: 7.42 [PH] (ref 7.35–7.45)
PH SMN: 7.37 [PH] (ref 7.35–7.45)
PH SMN: 7.39 [PH] (ref 7.35–7.45)
PHOSPHATE SERPL-MCNC: 2.1 MG/DL (ref 2.3–4.7)
PHOSPHATE SERPL-MCNC: 2.4 MG/DL (ref 2.3–4.7)
PLATELET # BLD AUTO: 104 X10(3)/MCL (ref 130–400)
PLATELET # BLD AUTO: 106 X10(3)/MCL (ref 130–400)
PLATELET # BLD AUTO: 111 X10(3)/MCL (ref 130–400)
PLATELET # BLD AUTO: 164 X10(3)/MCL (ref 130–400)
PLATELET # BLD AUTO: 172 X10(3)/MCL (ref 130–400)
PLATELET # BLD AUTO: 192 X10(3)/MCL (ref 130–400)
PLATELET # BLD AUTO: 198 X10(3)/MCL (ref 130–400)
PLATELET # BLD AUTO: 315 X10(3)/MCL (ref 130–400)
PLATELET # BLD AUTO: 82 X10(3)/MCL (ref 130–400)
PLATELET # BLD AUTO: 97 X10(3)/MCL (ref 130–400)
PLATELETS.RETICULATED NFR BLD AUTO: 2.8 % (ref 0.9–11.2)
PLATELETS.RETICULATED NFR BLD AUTO: 3.2 % (ref 0.9–11.2)
PLATELETS.RETICULATED NFR BLD AUTO: 3.3 % (ref 0.9–11.2)
PLATELETS.RETICULATED NFR BLD AUTO: 4.1 % (ref 0.9–11.2)
PMV BLD AUTO: 10.1 FL (ref 7.4–10.4)
PMV BLD AUTO: 10.5 FL (ref 7.4–10.4)
PMV BLD AUTO: 10.5 FL (ref 7.4–10.4)
PMV BLD AUTO: 10.8 FL (ref 7.4–10.4)
PMV BLD AUTO: 10.9 FL (ref 7.4–10.4)
PMV BLD AUTO: 11.3 FL (ref 7.4–10.4)
PMV BLD AUTO: 11.4 FL (ref 7.4–10.4)
PMV BLD AUTO: 9.3 FL (ref 7.4–10.4)
PMV BLD AUTO: 9.6 FL (ref 7.4–10.4)
PMV BLD AUTO: 9.8 FL (ref 7.4–10.4)
PO2 BLDA: 104 MMHG (ref 80–100)
PO2 BLDA: 132 MMHG (ref 80–100)
PO2 BLDA: 133 MMHG (ref 80–100)
PO2 BLDA: 298 MMHG (ref 80–100)
PO2 BLDA: 482 MMHG (ref 80–100)
POC BE: -2 MMOL/L
POC BE: 0 MMOL/L
POC IONIZED CALCIUM: 1.15 MMOL/L (ref 1.06–1.42)
POC IONIZED CALCIUM: 1.32 MMOL/L (ref 1.06–1.42)
POC PCO2 TEMP: 38.5 MMHG
POC PCO2 TEMP: 39.6 MMHG
POC PH TEMP: 7.38
POC PH TEMP: 7.4
POC PO2 TEMP: 293 MMHG
POC PO2 TEMP: 475 MMHG
POC SATURATED O2: 100 % (ref 95–100)
POC SATURATED O2: 100 % (ref 95–100)
POC TCO2: 24 MMOL/L (ref 23–27)
POC TCO2: 26 MMOL/L (ref 23–27)
POC TEMPERATURE: ABNORMAL
POC TEMPERATURE: ABNORMAL
POTASSIUM BLD-SCNC: 4 MMOL/L (ref 3.5–5.1)
POTASSIUM BLD-SCNC: 4.7 MMOL/L (ref 3.5–5.1)
POTASSIUM BLOOD GAS (OHS): 3.9 MMOL/L (ref 3.5–5)
POTASSIUM BLOOD GAS (OHS): 4.1 MMOL/L (ref 3.5–5)
POTASSIUM BLOOD GAS (OHS): 4.4 MMOL/L (ref 3.5–5)
POTASSIUM SERPL-SCNC: 2.7 MMOL/L (ref 3.5–5.1)
POTASSIUM SERPL-SCNC: 3.5 MMOL/L (ref 3.5–5.1)
POTASSIUM SERPL-SCNC: 3.6 MMOL/L (ref 3.5–5.1)
POTASSIUM SERPL-SCNC: 3.7 MMOL/L (ref 3.5–5.1)
POTASSIUM SERPL-SCNC: 3.9 MMOL/L (ref 3.5–5.1)
POTASSIUM SERPL-SCNC: 4 MMOL/L (ref 3.5–5.1)
POTASSIUM SERPL-SCNC: 4 MMOL/L (ref 3.5–5.1)
POTASSIUM SERPL-SCNC: 4.4 MMOL/L (ref 3.5–5.1)
POTASSIUM SERPL-SCNC: 5 MMOL/L (ref 3.5–5.1)
PROT SERPL-MCNC: 3.8 GM/DL (ref 5.8–7.6)
PROT SERPL-MCNC: 4.3 GM/DL (ref 5.8–7.6)
PROT SERPL-MCNC: 4.6 GM/DL (ref 5.8–7.6)
PROT SERPL-MCNC: 4.7 GM/DL (ref 5.8–7.6)
PROT SERPL-MCNC: 5.3 GM/DL (ref 5.8–7.6)
PROT SERPL-MCNC: 5.4 GM/DL (ref 5.8–7.6)
PS (OHS): 10 CMH2O
RBC # BLD AUTO: 2.67 X10(6)/MCL (ref 4.7–6.1)
RBC # BLD AUTO: 2.75 X10(6)/MCL (ref 4.7–6.1)
RBC # BLD AUTO: 2.84 X10(6)/MCL (ref 4.7–6.1)
RBC # BLD AUTO: 2.86 X10(6)/MCL (ref 4.7–6.1)
RBC # BLD AUTO: 2.95 X10(6)/MCL (ref 4.7–6.1)
RBC # BLD AUTO: 3.14 X10(6)/MCL (ref 4.7–6.1)
RBC # BLD AUTO: 3.16 X10(6)/MCL (ref 4.7–6.1)
RBC # BLD AUTO: 3.16 X10(6)/MCL (ref 4.7–6.1)
RBC # BLD AUTO: 3.17 X10(6)/MCL (ref 4.7–6.1)
RBC # BLD AUTO: 3.59 X10(6)/MCL (ref 4.7–6.1)
RBCS: NORMAL
SAMPLE SITE BLOOD GAS (OHS): ABNORMAL
SAMPLE: ABNORMAL
SAMPLE: ABNORMAL
SAO2 % BLDA: 98 %
SAO2 % BLDA: 98.8 %
SAO2 % BLDA: 99 %
SODIUM BLD-SCNC: 133 MMOL/L (ref 136–145)
SODIUM BLD-SCNC: 135 MMOL/L (ref 136–145)
SODIUM BLOOD GAS (OHS): 133 MMOL/L (ref 137–145)
SODIUM BLOOD GAS (OHS): 134 MMOL/L (ref 137–145)
SODIUM BLOOD GAS (OHS): 135 MMOL/L (ref 137–145)
SODIUM SERPL-SCNC: 130 MMOL/L (ref 136–145)
SODIUM SERPL-SCNC: 133 MMOL/L (ref 136–145)
SODIUM SERPL-SCNC: 135 MMOL/L (ref 136–145)
SODIUM SERPL-SCNC: 135 MMOL/L (ref 136–145)
SODIUM SERPL-SCNC: 136 MMOL/L (ref 136–145)
SODIUM SERPL-SCNC: 137 MMOL/L (ref 136–145)
SODIUM SERPL-SCNC: 139 MMOL/L (ref 136–145)
SODIUM SERPL-SCNC: 143 MMOL/L (ref 136–145)
SODIUM SERPL-SCNC: 146 MMOL/L (ref 136–145)
SPECIMEN OUTDATE: NORMAL
SPONT+MECH VT ON VENT: 500 ML
TROPONIN I SERPL-MCNC: 0.03 NG/ML (ref 0–0.04)
TROPONIN I SERPL-MCNC: 0.04 NG/ML (ref 0–0.04)
TSH SERPL-ACNC: 1.04 UIU/ML (ref 0.35–4.94)
TSH SERPL-ACNC: 2.79 UIU/ML (ref 0.35–4.94)
UNIT NUMBER: NORMAL
WBC # SPEC AUTO: 11.85 X10(3)/MCL (ref 4.5–11.5)
WBC # SPEC AUTO: 12.87 X10(3)/MCL (ref 4.5–11.5)
WBC # SPEC AUTO: 13.23 X10(3)/MCL (ref 4.5–11.5)
WBC # SPEC AUTO: 13.33 X10(3)/MCL (ref 4.5–11.5)
WBC # SPEC AUTO: 13.38 X10(3)/MCL (ref 4.5–11.5)
WBC # SPEC AUTO: 13.56 X10(3)/MCL (ref 4.5–11.5)
WBC # SPEC AUTO: 13.63 X10(3)/MCL (ref 4.5–11.5)
WBC # SPEC AUTO: 14.31 X10(3)/MCL (ref 4.5–11.5)
WBC # SPEC AUTO: 14.45 X10(3)/MCL (ref 4.5–11.5)
WBC # SPEC AUTO: 8.9 X10(3)/MCL (ref 4.5–11.5)

## 2024-01-01 PROCEDURE — 94761 N-INVAS EAR/PLS OXIMETRY MLT: CPT | Mod: XB

## 2024-01-01 PROCEDURE — 27000207 HC ISOLATION

## 2024-01-01 PROCEDURE — 83735 ASSAY OF MAGNESIUM: CPT | Performed by: SPECIALIST

## 2024-01-01 PROCEDURE — 80048 BASIC METABOLIC PNL TOTAL CA: CPT | Performed by: INTERNAL MEDICINE

## 2024-01-01 PROCEDURE — 03HB33Z INSERTION OF INFUSION DEVICE INTO RIGHT RADIAL ARTERY, PERCUTANEOUS APPROACH: ICD-10-PCS | Performed by: INTERNAL MEDICINE

## 2024-01-01 PROCEDURE — 82803 BLOOD GASES ANY COMBINATION: CPT

## 2024-01-01 PROCEDURE — 80048 BASIC METABOLIC PNL TOTAL CA: CPT | Performed by: STUDENT IN AN ORGANIZED HEALTH CARE EDUCATION/TRAINING PROGRAM

## 2024-01-01 PROCEDURE — 25000003 PHARM REV CODE 250

## 2024-01-01 PROCEDURE — 30233N1 TRANSFUSION OF NONAUTOLOGOUS RED BLOOD CELLS INTO PERIPHERAL VEIN, PERCUTANEOUS APPROACH: ICD-10-PCS | Performed by: INTERNAL MEDICINE

## 2024-01-01 PROCEDURE — 27100171 HC OXYGEN HIGH FLOW UP TO 24 HOURS

## 2024-01-01 PROCEDURE — 84484 ASSAY OF TROPONIN QUANT: CPT | Performed by: STUDENT IN AN ORGANIZED HEALTH CARE EDUCATION/TRAINING PROGRAM

## 2024-01-01 PROCEDURE — 83735 ASSAY OF MAGNESIUM: CPT | Performed by: INTERNAL MEDICINE

## 2024-01-01 PROCEDURE — P9016 RBC LEUKOCYTES REDUCED: HCPCS | Performed by: SPECIALIST

## 2024-01-01 PROCEDURE — 99900017 HC EXTUBATION W/PARAMETERS (STAT)

## 2024-01-01 PROCEDURE — 96365 THER/PROPH/DIAG IV INF INIT: CPT | Mod: 59

## 2024-01-01 PROCEDURE — 86923 COMPATIBILITY TEST ELECTRIC: CPT | Mod: 91 | Performed by: SPECIALIST

## 2024-01-01 PROCEDURE — 63600175 PHARM REV CODE 636 W HCPCS

## 2024-01-01 PROCEDURE — 86850 RBC ANTIBODY SCREEN: CPT | Performed by: SPECIALIST

## 2024-01-01 PROCEDURE — 80053 COMPREHEN METABOLIC PANEL: CPT | Performed by: STUDENT IN AN ORGANIZED HEALTH CARE EDUCATION/TRAINING PROGRAM

## 2024-01-01 PROCEDURE — 63600175 PHARM REV CODE 636 W HCPCS: Mod: JZ,JG | Performed by: SPECIALIST

## 2024-01-01 PROCEDURE — 85018 HEMOGLOBIN: CPT | Performed by: INTERNAL MEDICINE

## 2024-01-01 PROCEDURE — 85027 COMPLETE CBC AUTOMATED: CPT | Performed by: STUDENT IN AN ORGANIZED HEALTH CARE EDUCATION/TRAINING PROGRAM

## 2024-01-01 PROCEDURE — 25000003 PHARM REV CODE 250: Performed by: INTERNAL MEDICINE

## 2024-01-01 PROCEDURE — 85025 COMPLETE CBC W/AUTO DIFF WBC: CPT | Performed by: SPECIALIST

## 2024-01-01 PROCEDURE — 84443 ASSAY THYROID STIM HORMONE: CPT | Performed by: STUDENT IN AN ORGANIZED HEALTH CARE EDUCATION/TRAINING PROGRAM

## 2024-01-01 PROCEDURE — 85025 COMPLETE CBC W/AUTO DIFF WBC: CPT | Performed by: STUDENT IN AN ORGANIZED HEALTH CARE EDUCATION/TRAINING PROGRAM

## 2024-01-01 PROCEDURE — 21400001 HC TELEMETRY ROOM

## 2024-01-01 PROCEDURE — 85025 COMPLETE CBC W/AUTO DIFF WBC: CPT | Performed by: INTERNAL MEDICINE

## 2024-01-01 PROCEDURE — 99900035 HC TECH TIME PER 15 MIN (STAT)

## 2024-01-01 PROCEDURE — 25000003 PHARM REV CODE 250: Performed by: STUDENT IN AN ORGANIZED HEALTH CARE EDUCATION/TRAINING PROGRAM

## 2024-01-01 PROCEDURE — 63600175 PHARM REV CODE 636 W HCPCS: Performed by: STUDENT IN AN ORGANIZED HEALTH CARE EDUCATION/TRAINING PROGRAM

## 2024-01-01 PROCEDURE — 94761 N-INVAS EAR/PLS OXIMETRY MLT: CPT

## 2024-01-01 PROCEDURE — 85014 HEMATOCRIT: CPT | Performed by: INTERNAL MEDICINE

## 2024-01-01 PROCEDURE — 85014 HEMATOCRIT: CPT | Performed by: HOSPITALIST

## 2024-01-01 PROCEDURE — C1768 GRAFT, VASCULAR: HCPCS | Performed by: SPECIALIST

## 2024-01-01 PROCEDURE — 85014 HEMATOCRIT: CPT | Performed by: STUDENT IN AN ORGANIZED HEALTH CARE EDUCATION/TRAINING PROGRAM

## 2024-01-01 PROCEDURE — 97530 THERAPEUTIC ACTIVITIES: CPT

## 2024-01-01 PROCEDURE — 83735 ASSAY OF MAGNESIUM: CPT | Performed by: NURSE PRACTITIONER

## 2024-01-01 PROCEDURE — 80053 COMPREHEN METABOLIC PANEL: CPT | Performed by: NURSE PRACTITIONER

## 2024-01-01 PROCEDURE — 86901 BLOOD TYPING SEROLOGIC RH(D): CPT | Performed by: SPECIALIST

## 2024-01-01 PROCEDURE — P9047 ALBUMIN (HUMAN), 25%, 50ML: HCPCS | Mod: JZ,JG

## 2024-01-01 PROCEDURE — 25000003 PHARM REV CODE 250: Performed by: SPECIALIST

## 2024-01-01 PROCEDURE — 04R00JZ REPLACEMENT OF ABDOMINAL AORTA WITH SYNTHETIC SUBSTITUTE, OPEN APPROACH: ICD-10-PCS | Performed by: SPECIALIST

## 2024-01-01 PROCEDURE — 93005 ELECTROCARDIOGRAM TRACING: CPT | Mod: 59

## 2024-01-01 PROCEDURE — 04100JG BYPASS ABDOMINAL AORTA TO BILATERAL EXTERNAL ILIAC ARTERIES WITH SYNTHETIC SUBSTITUTE, OPEN APPROACH: ICD-10-PCS | Performed by: SPECIALIST

## 2024-01-01 PROCEDURE — 20000000 HC ICU ROOM

## 2024-01-01 PROCEDURE — 90677 PCV20 VACCINE IM: CPT | Mod: PBBFAC

## 2024-01-01 PROCEDURE — D9220A PRA ANESTHESIA: Mod: ANES,,, | Performed by: ANESTHESIOLOGY

## 2024-01-01 PROCEDURE — 85018 HEMOGLOBIN: CPT | Performed by: HOSPITALIST

## 2024-01-01 PROCEDURE — 43753 TX GASTRO INTUB W/ASP: CPT

## 2024-01-01 PROCEDURE — 25000003 PHARM REV CODE 250: Performed by: HOSPITALIST

## 2024-01-01 PROCEDURE — 84100 ASSAY OF PHOSPHORUS: CPT | Performed by: INTERNAL MEDICINE

## 2024-01-01 PROCEDURE — 71046 X-RAY EXAM CHEST 2 VIEWS: CPT | Mod: TC

## 2024-01-01 PROCEDURE — 27000221 HC OXYGEN, UP TO 24 HOURS

## 2024-01-01 PROCEDURE — 93010 ELECTROCARDIOGRAM REPORT: CPT | Mod: ,,, | Performed by: INTERNAL MEDICINE

## 2024-01-01 PROCEDURE — 90471 IMMUNIZATION ADMIN: CPT | Mod: PBBFAC

## 2024-01-01 PROCEDURE — 80053 COMPREHEN METABOLIC PANEL: CPT | Performed by: INTERNAL MEDICINE

## 2024-01-01 PROCEDURE — 99900031 HC PATIENT EDUCATION (STAT)

## 2024-01-01 PROCEDURE — 99214 OFFICE O/P EST MOD 30 MIN: CPT | Mod: PBBFAC | Performed by: STUDENT IN AN ORGANIZED HEALTH CARE EDUCATION/TRAINING PROGRAM

## 2024-01-01 PROCEDURE — 84100 ASSAY OF PHOSPHORUS: CPT | Performed by: NURSE PRACTITIONER

## 2024-01-01 PROCEDURE — 31500 INSERT EMERGENCY AIRWAY: CPT

## 2024-01-01 PROCEDURE — 25500020 PHARM REV CODE 255: Performed by: HOSPITALIST

## 2024-01-01 PROCEDURE — 5A1935Z RESPIRATORY VENTILATION, LESS THAN 24 CONSECUTIVE HOURS: ICD-10-PCS | Performed by: SPECIALIST

## 2024-01-01 PROCEDURE — 80053 COMPREHEN METABOLIC PANEL: CPT | Performed by: SPECIALIST

## 2024-01-01 PROCEDURE — 11000001 HC ACUTE MED/SURG PRIVATE ROOM

## 2024-01-01 PROCEDURE — 94002 VENT MGMT INPAT INIT DAY: CPT

## 2024-01-01 PROCEDURE — 37000009 HC ANESTHESIA EA ADD 15 MINS: Performed by: SPECIALIST

## 2024-01-01 PROCEDURE — 36620 INSERTION CATHETER ARTERY: CPT | Mod: 59,,,

## 2024-01-01 PROCEDURE — 63600175 PHARM REV CODE 636 W HCPCS: Performed by: SPECIALIST

## 2024-01-01 PROCEDURE — 97116 GAIT TRAINING THERAPY: CPT

## 2024-01-01 PROCEDURE — 85018 HEMOGLOBIN: CPT | Performed by: STUDENT IN AN ORGANIZED HEALTH CARE EDUCATION/TRAINING PROGRAM

## 2024-01-01 PROCEDURE — C9248 INJ, CLEVIDIPINE BUTYRATE: HCPCS | Performed by: STUDENT IN AN ORGANIZED HEALTH CARE EDUCATION/TRAINING PROGRAM

## 2024-01-01 PROCEDURE — 87045 FECES CULTURE AEROBIC BACT: CPT | Performed by: STUDENT IN AN ORGANIZED HEALTH CARE EDUCATION/TRAINING PROGRAM

## 2024-01-01 PROCEDURE — 85025 COMPLETE CBC W/AUTO DIFF WBC: CPT | Performed by: NURSE PRACTITIONER

## 2024-01-01 PROCEDURE — 27200966 HC CLOSED SUCTION SYSTEM

## 2024-01-01 PROCEDURE — 83605 ASSAY OF LACTIC ACID: CPT | Performed by: STUDENT IN AN ORGANIZED HEALTH CARE EDUCATION/TRAINING PROGRAM

## 2024-01-01 PROCEDURE — 93005 ELECTROCARDIOGRAM TRACING: CPT

## 2024-01-01 PROCEDURE — 37000008 HC ANESTHESIA 1ST 15 MINUTES: Performed by: SPECIALIST

## 2024-01-01 PROCEDURE — 36000709 HC OR TIME LEV III EA ADD 15 MIN: Performed by: SPECIALIST

## 2024-01-01 PROCEDURE — 94003 VENT MGMT INPAT SUBQ DAY: CPT

## 2024-01-01 PROCEDURE — 37799 UNLISTED PX VASCULAR SURGERY: CPT

## 2024-01-01 PROCEDURE — 86318 IA INFECTIOUS AGENT ANTIBODY: CPT | Performed by: STUDENT IN AN ORGANIZED HEALTH CARE EDUCATION/TRAINING PROGRAM

## 2024-01-01 PROCEDURE — D9220A PRA ANESTHESIA: Mod: CRNA,,,

## 2024-01-01 PROCEDURE — 27201423 OPTIME MED/SURG SUP & DEVICES STERILE SUPPLY: Performed by: SPECIALIST

## 2024-01-01 PROCEDURE — 83735 ASSAY OF MAGNESIUM: CPT | Performed by: STUDENT IN AN ORGANIZED HEALTH CARE EDUCATION/TRAINING PROGRAM

## 2024-01-01 PROCEDURE — S5010 5% DEXTROSE AND 0.45% SALINE: HCPCS | Performed by: SPECIALIST

## 2024-01-01 PROCEDURE — 97162 PT EVAL MOD COMPLEX 30 MIN: CPT

## 2024-01-01 PROCEDURE — 36620 INSERTION CATHETER ARTERY: CPT

## 2024-01-01 PROCEDURE — 87077 CULTURE AEROBIC IDENTIFY: CPT | Performed by: STUDENT IN AN ORGANIZED HEALTH CARE EDUCATION/TRAINING PROGRAM

## 2024-01-01 PROCEDURE — 99291 CRITICAL CARE FIRST HOUR: CPT

## 2024-01-01 PROCEDURE — 36000708 HC OR TIME LEV III 1ST 15 MIN: Performed by: SPECIALIST

## 2024-01-01 PROCEDURE — 86900 BLOOD TYPING SEROLOGIC ABO: CPT | Performed by: SPECIALIST

## 2024-01-01 RX ORDER — LISINOPRIL 5 MG/1
5 TABLET ORAL DAILY
Status: CANCELLED | OUTPATIENT
Start: 2024-02-14

## 2024-01-01 RX ORDER — FENTANYL CITRATE 50 UG/ML
INJECTION, SOLUTION INTRAMUSCULAR; INTRAVENOUS
Status: DISCONTINUED | OUTPATIENT
Start: 2024-01-01 | End: 2024-01-01

## 2024-01-01 RX ORDER — NOREPINEPHRINE BITARTRATE/D5W 8 MG/250ML
0-3 PLASTIC BAG, INJECTION (ML) INTRAVENOUS CONTINUOUS
Status: DISCONTINUED | OUTPATIENT
Start: 2024-01-01 | End: 2024-01-01

## 2024-01-01 RX ORDER — MAGNESIUM SULFATE HEPTAHYDRATE 40 MG/ML
4 INJECTION, SOLUTION INTRAVENOUS ONCE
Status: COMPLETED | OUTPATIENT
Start: 2024-01-01 | End: 2024-01-01

## 2024-01-01 RX ORDER — FENTANYL CITRATE 50 UG/ML
50 INJECTION, SOLUTION INTRAMUSCULAR; INTRAVENOUS
Status: ACTIVE | OUTPATIENT
Start: 2024-01-01 | End: 2024-01-01

## 2024-01-01 RX ORDER — CALCIUM CHLORIDE INJECTION 100 MG/ML
INJECTION, SOLUTION INTRAVENOUS
Status: DISCONTINUED | OUTPATIENT
Start: 2024-01-01 | End: 2024-01-01

## 2024-01-01 RX ORDER — POTASSIUM CHLORIDE 7.45 MG/ML
40 INJECTION INTRAVENOUS
Status: CANCELLED | OUTPATIENT
Start: 2024-01-01

## 2024-01-01 RX ORDER — DEXTROSE MONOHYDRATE AND SODIUM CHLORIDE 5; .45 G/100ML; G/100ML
INJECTION, SOLUTION INTRAVENOUS CONTINUOUS
Status: DISCONTINUED | OUTPATIENT
Start: 2024-01-01 | End: 2024-01-01 | Stop reason: HOSPADM

## 2024-01-01 RX ORDER — OLANZAPINE 5 MG/1
10 TABLET ORAL NIGHTLY
Status: DISCONTINUED | OUTPATIENT
Start: 2024-01-01 | End: 2024-01-01 | Stop reason: HOSPADM

## 2024-01-01 RX ORDER — SODIUM CHLORIDE 9 MG/ML
INJECTION, SOLUTION INTRAVENOUS CONTINUOUS
Status: DISCONTINUED | OUTPATIENT
Start: 2024-01-01 | End: 2024-01-01 | Stop reason: HOSPADM

## 2024-01-01 RX ORDER — OLANZAPINE 10 MG/1
10 TABLET ORAL NIGHTLY
Qty: 30 TABLET | Refills: 11 | Status: SHIPPED | OUTPATIENT
Start: 2024-01-01 | End: 2025-01-16

## 2024-01-01 RX ORDER — CALCIUM GLUCONATE 20 MG/ML
3 INJECTION, SOLUTION INTRAVENOUS
Status: CANCELLED | OUTPATIENT
Start: 2024-01-01

## 2024-01-01 RX ORDER — PROPOFOL 10 MG/ML
VIAL (ML) INTRAVENOUS
Status: DISCONTINUED | OUTPATIENT
Start: 2024-01-01 | End: 2024-01-01

## 2024-01-01 RX ORDER — OLANZAPINE 5 MG/1
10 TABLET ORAL 2 TIMES DAILY
Status: DISCONTINUED | OUTPATIENT
Start: 2024-01-01 | End: 2024-01-01

## 2024-01-01 RX ORDER — ATORVASTATIN CALCIUM 40 MG/1
80 TABLET, FILM COATED ORAL NIGHTLY
Status: CANCELLED | OUTPATIENT
Start: 2024-01-01

## 2024-01-01 RX ORDER — POTASSIUM CHLORIDE 14.9 MG/ML
20 INJECTION INTRAVENOUS
Status: ACTIVE | OUTPATIENT
Start: 2024-01-01 | End: 2024-01-01

## 2024-01-01 RX ORDER — HEPARIN SODIUM 5000 [USP'U]/ML
INJECTION, SOLUTION INTRAVENOUS; SUBCUTANEOUS
Status: DISCONTINUED | OUTPATIENT
Start: 2024-01-01 | End: 2024-01-01 | Stop reason: HOSPADM

## 2024-01-01 RX ORDER — ONDANSETRON HYDROCHLORIDE 2 MG/ML
4 INJECTION, SOLUTION INTRAVENOUS EVERY 6 HOURS PRN
Status: DISCONTINUED | OUTPATIENT
Start: 2024-01-01 | End: 2024-01-01 | Stop reason: HOSPADM

## 2024-01-01 RX ORDER — DEXAMETHASONE SODIUM PHOSPHATE 4 MG/ML
INJECTION, SOLUTION INTRA-ARTICULAR; INTRALESIONAL; INTRAMUSCULAR; INTRAVENOUS; SOFT TISSUE
Status: DISCONTINUED | OUTPATIENT
Start: 2024-01-01 | End: 2024-01-01

## 2024-01-01 RX ORDER — LISINOPRIL 5 MG/1
5 TABLET ORAL DAILY
Status: DISCONTINUED | OUTPATIENT
Start: 2024-01-01 | End: 2024-01-01 | Stop reason: HOSPADM

## 2024-01-01 RX ORDER — NOREPINEPHRINE BITARTRATE/D5W 8 MG/250ML
PLASTIC BAG, INJECTION (ML) INTRAVENOUS
Status: DISCONTINUED
Start: 2024-01-01 | End: 2024-02-14 | Stop reason: HOSPADM

## 2024-01-01 RX ORDER — VASOPRESSIN 20 [USP'U]/ML
INJECTION, SOLUTION INTRAMUSCULAR; SUBCUTANEOUS
Status: DISCONTINUED
Start: 2024-01-01 | End: 2024-02-14 | Stop reason: HOSPADM

## 2024-01-01 RX ORDER — FUROSEMIDE 10 MG/ML
40 INJECTION INTRAMUSCULAR; INTRAVENOUS ONCE
Status: COMPLETED | OUTPATIENT
Start: 2024-01-01 | End: 2024-01-01

## 2024-01-01 RX ORDER — BENZTROPINE MESYLATE 0.5 MG/1
0.5 TABLET ORAL 2 TIMES DAILY
Status: DISCONTINUED | OUTPATIENT
Start: 2024-01-01 | End: 2024-01-01 | Stop reason: HOSPADM

## 2024-01-01 RX ORDER — PROTAMINE SULFATE 10 MG/ML
INJECTION, SOLUTION INTRAVENOUS
Status: DISCONTINUED | OUTPATIENT
Start: 2024-01-01 | End: 2024-01-01

## 2024-01-01 RX ORDER — HEPARIN SODIUM 1000 [USP'U]/ML
INJECTION, SOLUTION INTRAVENOUS; SUBCUTANEOUS
Status: DISCONTINUED | OUTPATIENT
Start: 2024-01-01 | End: 2024-01-01

## 2024-01-01 RX ORDER — ALBUMIN HUMAN 250 G/1000ML
SOLUTION INTRAVENOUS
Status: DISCONTINUED | OUTPATIENT
Start: 2024-01-01 | End: 2024-01-01

## 2024-01-01 RX ORDER — SODIUM BICARBONATE 1 MEQ/ML
SYRINGE (ML) INTRAVENOUS CODE/TRAUMA/SEDATION MEDICATION
Status: COMPLETED | OUTPATIENT
Start: 2024-01-01 | End: 2024-01-01

## 2024-01-01 RX ORDER — PHENYLEPHRINE HYDROCHLORIDE 10 MG/ML
INJECTION INTRAVENOUS CONTINUOUS PRN
Status: DISCONTINUED | OUTPATIENT
Start: 2024-01-01 | End: 2024-01-01

## 2024-01-01 RX ORDER — CEFAZOLIN SODIUM 1 G/3ML
INJECTION, POWDER, FOR SOLUTION INTRAMUSCULAR; INTRAVENOUS
Status: DISCONTINUED | OUTPATIENT
Start: 2024-01-01 | End: 2024-01-01 | Stop reason: HOSPADM

## 2024-01-01 RX ORDER — LOPERAMIDE HYDROCHLORIDE 2 MG/1
2 CAPSULE ORAL 4 TIMES DAILY PRN
Status: DISCONTINUED | OUTPATIENT
Start: 2024-01-01 | End: 2024-01-01 | Stop reason: HOSPADM

## 2024-01-01 RX ORDER — HYDROCODONE BITARTRATE AND ACETAMINOPHEN 7.5; 325 MG/1; MG/1
1 TABLET ORAL EVERY 4 HOURS PRN
COMMUNITY
Start: 2024-01-01

## 2024-01-01 RX ORDER — SODIUM CHLORIDE 0.9 % (FLUSH) 0.9 %
10 SYRINGE (ML) INJECTION
Status: CANCELLED | OUTPATIENT
Start: 2024-01-01

## 2024-01-01 RX ORDER — FUROSEMIDE 10 MG/ML
INJECTION INTRAMUSCULAR; INTRAVENOUS
Status: DISCONTINUED | OUTPATIENT
Start: 2024-01-01 | End: 2024-01-01

## 2024-01-01 RX ORDER — HYDRALAZINE HYDROCHLORIDE 20 MG/ML
10 INJECTION INTRAMUSCULAR; INTRAVENOUS EVERY 6 HOURS PRN
Status: DISCONTINUED | OUTPATIENT
Start: 2024-01-01 | End: 2024-01-01 | Stop reason: HOSPADM

## 2024-01-01 RX ORDER — MAGNESIUM SULFATE HEPTAHYDRATE 40 MG/ML
4 INJECTION, SOLUTION INTRAVENOUS
Status: CANCELLED | OUTPATIENT
Start: 2024-01-01

## 2024-01-01 RX ORDER — CEFAZOLIN SODIUM 2 G/50ML
2 SOLUTION INTRAVENOUS
Status: COMPLETED | OUTPATIENT
Start: 2024-01-01 | End: 2024-01-01

## 2024-01-01 RX ORDER — CEFAZOLIN SODIUM 2 G/50ML
2 SOLUTION INTRAVENOUS
Status: DISCONTINUED | OUTPATIENT
Start: 2024-01-01 | End: 2024-01-01 | Stop reason: HOSPADM

## 2024-01-01 RX ORDER — LIDOCAINE HYDROCHLORIDE 20 MG/ML
INJECTION INTRAVENOUS
Status: DISCONTINUED | OUTPATIENT
Start: 2024-01-01 | End: 2024-01-01

## 2024-01-01 RX ORDER — CEFAZOLIN SODIUM 1 G/3ML
INJECTION, POWDER, FOR SOLUTION INTRAMUSCULAR; INTRAVENOUS
Status: DISCONTINUED | OUTPATIENT
Start: 2024-01-01 | End: 2024-01-01

## 2024-01-01 RX ORDER — MANNITOL 250 MG/ML
INJECTION, SOLUTION INTRAVENOUS
Status: DISCONTINUED | OUTPATIENT
Start: 2024-01-01 | End: 2024-01-01

## 2024-01-01 RX ORDER — POTASSIUM CHLORIDE 7.45 MG/ML
80 INJECTION INTRAVENOUS
Status: CANCELLED | OUTPATIENT
Start: 2024-01-01

## 2024-01-01 RX ORDER — GABAPENTIN 300 MG/1
300 CAPSULE ORAL NIGHTLY
Status: DISCONTINUED | OUTPATIENT
Start: 2024-01-01 | End: 2024-01-01 | Stop reason: HOSPADM

## 2024-01-01 RX ORDER — GLYCOPYRROLATE 0.2 MG/ML
INJECTION INTRAMUSCULAR; INTRAVENOUS
Status: DISCONTINUED | OUTPATIENT
Start: 2024-01-01 | End: 2024-01-01

## 2024-01-01 RX ORDER — PROPOFOL 10 MG/ML
0-50 INJECTION, EMULSION INTRAVENOUS CONTINUOUS
Status: DISCONTINUED | OUTPATIENT
Start: 2024-01-01 | End: 2024-01-01

## 2024-01-01 RX ORDER — NOREPINEPHRINE BITARTRATE/D5W 8 MG/250ML
PLASTIC BAG, INJECTION (ML) INTRAVENOUS
Status: COMPLETED
Start: 2024-01-01 | End: 2024-01-01

## 2024-01-01 RX ORDER — CALCIUM CHLORIDE INJECTION 100 MG/ML
INJECTION, SOLUTION INTRAVENOUS CODE/TRAUMA/SEDATION MEDICATION
Status: COMPLETED | OUTPATIENT
Start: 2024-01-01 | End: 2024-01-01

## 2024-01-01 RX ORDER — CALCIUM GLUCONATE 20 MG/ML
2 INJECTION, SOLUTION INTRAVENOUS
Status: CANCELLED | OUTPATIENT
Start: 2024-01-01

## 2024-01-01 RX ORDER — ROCURONIUM BROMIDE 10 MG/ML
INJECTION, SOLUTION INTRAVENOUS
Status: DISCONTINUED | OUTPATIENT
Start: 2024-01-01 | End: 2024-01-01

## 2024-01-01 RX ORDER — HEPARIN SODIUM 5000 [USP'U]/ML
5000 INJECTION, SOLUTION INTRAVENOUS; SUBCUTANEOUS EVERY 12 HOURS
Status: CANCELLED | OUTPATIENT
Start: 2024-01-01

## 2024-01-01 RX ORDER — METOPROLOL SUCCINATE 50 MG/1
50 TABLET, EXTENDED RELEASE ORAL DAILY
Status: DISCONTINUED | OUTPATIENT
Start: 2024-01-01 | End: 2024-01-01 | Stop reason: HOSPADM

## 2024-01-01 RX ORDER — CALCIUM GLUCONATE 20 MG/ML
1 INJECTION, SOLUTION INTRAVENOUS
Status: CANCELLED | OUTPATIENT
Start: 2024-01-01

## 2024-01-01 RX ORDER — EPINEPHRINE 0.1 MG/ML
INJECTION INTRAVENOUS CODE/TRAUMA/SEDATION MEDICATION
Status: COMPLETED | OUTPATIENT
Start: 2024-01-01 | End: 2024-01-01

## 2024-01-01 RX ORDER — MAGNESIUM SULFATE HEPTAHYDRATE 40 MG/ML
2 INJECTION, SOLUTION INTRAVENOUS
Status: CANCELLED | OUTPATIENT
Start: 2024-01-01

## 2024-01-01 RX ORDER — NAPROXEN SODIUM 220 MG/1
81 TABLET, FILM COATED ORAL DAILY
Status: CANCELLED | OUTPATIENT
Start: 2024-02-14

## 2024-01-01 RX ORDER — NOREPINEPHRINE BITARTRATE/D5W 8 MG/250ML
PLASTIC BAG, INJECTION (ML) INTRAVENOUS
Status: DISCONTINUED
Start: 2024-01-01 | End: 2024-01-01 | Stop reason: WASHOUT

## 2024-01-01 RX ORDER — MORPHINE SULFATE 4 MG/ML
2 INJECTION, SOLUTION INTRAMUSCULAR; INTRAVENOUS EVERY 4 HOURS PRN
Status: DISCONTINUED | OUTPATIENT
Start: 2024-01-01 | End: 2024-01-01 | Stop reason: HOSPADM

## 2024-01-01 RX ORDER — FENTANYL CITRATE 50 UG/ML
50 INJECTION, SOLUTION INTRAMUSCULAR; INTRAVENOUS
Status: DISCONTINUED | OUTPATIENT
Start: 2024-01-01 | End: 2024-01-01 | Stop reason: HOSPADM

## 2024-01-01 RX ORDER — HYDROCODONE BITARTRATE AND ACETAMINOPHEN 500; 5 MG/1; MG/1
TABLET ORAL
Status: DISCONTINUED | OUTPATIENT
Start: 2024-01-01 | End: 2024-01-01 | Stop reason: HOSPADM

## 2024-01-01 RX ORDER — MUPIROCIN 20 MG/G
OINTMENT TOPICAL 2 TIMES DAILY
Status: DISPENSED | OUTPATIENT
Start: 2024-01-01 | End: 2024-01-01

## 2024-01-01 RX ORDER — ONDANSETRON 2 MG/ML
INJECTION INTRAMUSCULAR; INTRAVENOUS
Status: DISCONTINUED | OUTPATIENT
Start: 2024-01-01 | End: 2024-01-01

## 2024-01-01 RX ORDER — EPINEPHRINE 1 MG/ML
INJECTION, SOLUTION, CONCENTRATE INTRAVENOUS
Status: DISCONTINUED
Start: 2024-01-01 | End: 2024-02-14 | Stop reason: HOSPADM

## 2024-01-01 RX ORDER — MIDAZOLAM HYDROCHLORIDE 1 MG/ML
INJECTION INTRAMUSCULAR; INTRAVENOUS
Status: DISCONTINUED | OUTPATIENT
Start: 2024-01-01 | End: 2024-01-01

## 2024-01-01 RX ORDER — MORPHINE SULFATE 10 MG/ML
2 INJECTION INTRAMUSCULAR; INTRAVENOUS; SUBCUTANEOUS EVERY 4 HOURS PRN
Status: DISCONTINUED | OUTPATIENT
Start: 2024-01-01 | End: 2024-01-01

## 2024-01-01 RX ORDER — POTASSIUM CHLORIDE 7.45 MG/ML
60 INJECTION INTRAVENOUS
Status: CANCELLED | OUTPATIENT
Start: 2024-01-01

## 2024-01-01 RX ORDER — SILVER NITRATE 38.21; 12.74 MG/1; MG/1
1 STICK TOPICAL ONCE
Status: COMPLETED | OUTPATIENT
Start: 2024-01-01 | End: 2024-01-01

## 2024-01-01 RX ORDER — EPINEPHRINE 0.1 MG/ML
INJECTION INTRAVENOUS
Status: DISCONTINUED
Start: 2024-01-01 | End: 2024-02-14 | Stop reason: HOSPADM

## 2024-01-01 RX ORDER — METOPROLOL TARTRATE 50 MG/1
50 TABLET ORAL 2 TIMES DAILY
Status: CANCELLED | OUTPATIENT
Start: 2024-01-01

## 2024-01-01 RX ADMIN — OLANZAPINE 10 MG: 5 TABLET, FILM COATED ORAL at 09:01

## 2024-01-01 RX ADMIN — PROPOFOL 100 MG: 10 INJECTION, EMULSION INTRAVENOUS at 07:01

## 2024-01-01 RX ADMIN — NOREPINEPHRINE BITARTRATE 0.35 MCG/KG/MIN: 8 INJECTION, SOLUTION INTRAVENOUS at 04:01

## 2024-01-01 RX ADMIN — ROCURONIUM BROMIDE 30 MG: 10 SOLUTION INTRAVENOUS at 10:01

## 2024-01-01 RX ADMIN — NOREPINEPHRINE BITARTRATE 0.05 MCG/KG/MIN: 8 INJECTION, SOLUTION INTRAVENOUS at 02:01

## 2024-01-01 RX ADMIN — IOHEXOL 100 ML: 350 INJECTION, SOLUTION INTRAVENOUS at 03:01

## 2024-01-01 RX ADMIN — CEFAZOLIN SODIUM 2 G: 2 SOLUTION INTRAVENOUS at 02:01

## 2024-01-01 RX ADMIN — MORPHINE SULFATE 2 MG: 4 INJECTION, SOLUTION INTRAMUSCULAR; INTRAVENOUS at 01:01

## 2024-01-01 RX ADMIN — FUROSEMIDE 40 MG: 10 INJECTION, SOLUTION INTRAMUSCULAR; INTRAVENOUS at 08:01

## 2024-01-01 RX ADMIN — CLEVIPIDINE 2 MG/HR: 0.5 EMULSION INTRAVENOUS at 04:01

## 2024-01-01 RX ADMIN — CLEVIPIDINE 3 MG/HR: 0.5 EMULSION INTRAVENOUS at 08:01

## 2024-01-01 RX ADMIN — BENZTROPINE MESYLATE 0.5 MG: 0.5 TABLET ORAL at 08:01

## 2024-01-01 RX ADMIN — ROCURONIUM BROMIDE 50 MG: 10 SOLUTION INTRAVENOUS at 09:01

## 2024-01-01 RX ADMIN — OLANZAPINE 10 MG: 5 TABLET, FILM COATED ORAL at 03:01

## 2024-01-01 RX ADMIN — METOPROLOL SUCCINATE 50 MG: 50 TABLET, EXTENDED RELEASE ORAL at 09:01

## 2024-01-01 RX ADMIN — GABAPENTIN 300 MG: 300 CAPSULE ORAL at 09:01

## 2024-01-01 RX ADMIN — CEFAZOLIN 2 G: 330 INJECTION, POWDER, FOR SOLUTION INTRAMUSCULAR; INTRAVENOUS at 07:01

## 2024-01-01 RX ADMIN — METOPROLOL SUCCINATE 50 MG: 50 TABLET, EXTENDED RELEASE ORAL at 08:01

## 2024-01-01 RX ADMIN — BENZTROPINE MESYLATE 0.5 MG: 0.5 TABLET ORAL at 09:01

## 2024-01-01 RX ADMIN — EPINEPHRINE 1 MG: 0.1 INJECTION INTRAVENOUS at 05:02

## 2024-01-01 RX ADMIN — FENTANYL CITRATE 50 MCG: 50 INJECTION, SOLUTION INTRAMUSCULAR; INTRAVENOUS at 10:01

## 2024-01-01 RX ADMIN — PHENYLEPHRINE HYDROCHLORIDE 44.72 MCG/KG/MIN: 10 INJECTION INTRAVENOUS at 09:01

## 2024-01-01 RX ADMIN — FENTANYL CITRATE 100 MCG: 50 INJECTION, SOLUTION INTRAMUSCULAR; INTRAVENOUS at 08:01

## 2024-01-01 RX ADMIN — SODIUM CHLORIDE: 9 INJECTION, SOLUTION INTRAVENOUS at 02:01

## 2024-01-01 RX ADMIN — SODIUM CHLORIDE: 9 INJECTION, SOLUTION INTRAVENOUS at 04:01

## 2024-01-01 RX ADMIN — LISINOPRIL 5 MG: 5 TABLET ORAL at 09:01

## 2024-01-01 RX ADMIN — MIDAZOLAM HYDROCHLORIDE 2 MG: 1 INJECTION, SOLUTION INTRAMUSCULAR; INTRAVENOUS at 07:01

## 2024-01-01 RX ADMIN — MUPIROCIN: 20 OINTMENT TOPICAL at 09:01

## 2024-01-01 RX ADMIN — ALBUMIN (HUMAN) 100 ML: 12.5 SOLUTION INTRAVENOUS at 09:01

## 2024-01-01 RX ADMIN — MANNITOL 100 G: 12.5 INJECTION, SOLUTION INTRAVENOUS at 08:01

## 2024-01-01 RX ADMIN — CALCIUM CHLORIDE INJECTION 1 G: 100 INJECTION, SOLUTION INTRAVENOUS at 05:02

## 2024-01-01 RX ADMIN — LISINOPRIL 5 MG: 5 TABLET ORAL at 10:01

## 2024-01-01 RX ADMIN — GABAPENTIN 300 MG: 300 CAPSULE ORAL at 08:01

## 2024-01-01 RX ADMIN — ONDANSETRON 4 MG: 2 INJECTION INTRAMUSCULAR; INTRAVENOUS at 11:01

## 2024-01-01 RX ADMIN — SODIUM CHLORIDE 1000 ML: 9 INJECTION, SOLUTION INTRAVENOUS at 04:01

## 2024-01-01 RX ADMIN — ROCURONIUM BROMIDE 50 MG: 10 SOLUTION INTRAVENOUS at 07:01

## 2024-01-01 RX ADMIN — BENZTROPINE MESYLATE 0.5 MG: 0.5 TABLET ORAL at 10:01

## 2024-01-01 RX ADMIN — CALCIUM CHLORIDE INJECTION 500 MG: 100 INJECTION, SOLUTION INTRAVENOUS at 09:01

## 2024-01-01 RX ADMIN — SODIUM CHLORIDE 1000 ML: 9 INJECTION, SOLUTION INTRAVENOUS at 06:02

## 2024-01-01 RX ADMIN — ROCURONIUM BROMIDE 20 MG: 10 SOLUTION INTRAVENOUS at 08:01

## 2024-01-01 RX ADMIN — MUPIROCIN: 20 OINTMENT TOPICAL at 08:01

## 2024-01-01 RX ADMIN — PROTAMINE SULFATE 60 MG: 10 INJECTION, SOLUTION INTRAVENOUS at 10:01

## 2024-01-01 RX ADMIN — SODIUM CHLORIDE 1000 ML: 9 INJECTION, SOLUTION INTRAVENOUS at 05:02

## 2024-01-01 RX ADMIN — PNEUMOCOCCAL 20-VALENT CONJUGATE VACCINE 0.5 ML
2.2; 2.2; 2.2; 2.2; 2.2; 2.2; 2.2; 2.2; 2.2; 2.2; 2.2; 2.2; 2.2; 2.2; 2.2; 2.2; 4.4; 2.2; 2.2; 2.2 INJECTION, SUSPENSION INTRAMUSCULAR at 10:01

## 2024-01-01 RX ADMIN — NOREPINEPHRINE BITARTRATE 8 MG: 8 INJECTION, SOLUTION INTRAVENOUS at 03:01

## 2024-01-01 RX ADMIN — CEFAZOLIN SODIUM 2 G: 2 SOLUTION INTRAVENOUS at 06:01

## 2024-01-01 RX ADMIN — SODIUM CHLORIDE, SODIUM GLUCONATE, SODIUM ACETATE, POTASSIUM CHLORIDE AND MAGNESIUM CHLORIDE: 526; 502; 368; 37; 30 INJECTION, SOLUTION INTRAVENOUS at 07:01

## 2024-01-01 RX ADMIN — BENZTROPINE MESYLATE 0.5 MG: 0.5 TABLET ORAL at 03:01

## 2024-01-01 RX ADMIN — SODIUM BICARBONATE 50 MEQ: 84 INJECTION INTRAVENOUS at 05:02

## 2024-01-01 RX ADMIN — LISINOPRIL 5 MG: 5 TABLET ORAL at 08:01

## 2024-01-01 RX ADMIN — SILVER NITRATE APPLICATORS 1 APPLICATOR: 25; 75 STICK TOPICAL at 11:01

## 2024-01-01 RX ADMIN — FENTANYL CITRATE 100 MCG: 50 INJECTION, SOLUTION INTRAMUSCULAR; INTRAVENOUS at 07:01

## 2024-01-01 RX ADMIN — OLANZAPINE 10 MG: 5 TABLET, FILM COATED ORAL at 08:01

## 2024-01-01 RX ADMIN — HEPARIN SODIUM 6000 UNITS: 1000 INJECTION, SOLUTION INTRAVENOUS; SUBCUTANEOUS at 08:01

## 2024-01-01 RX ADMIN — EPINEPHRINE 1 MG: 0.1 INJECTION INTRAVENOUS at 06:02

## 2024-01-01 RX ADMIN — DEXTROSE AND SODIUM CHLORIDE: 5; 450 INJECTION, SOLUTION INTRAVENOUS at 04:01

## 2024-01-01 RX ADMIN — GABAPENTIN 300 MG: 300 CAPSULE ORAL at 10:01

## 2024-01-01 RX ADMIN — PROPOFOL 10 MCG/KG/MIN: 10 INJECTION, EMULSION INTRAVENOUS at 12:01

## 2024-01-01 RX ADMIN — LIDOCAINE HYDROCHLORIDE 80 MG: 20 INJECTION INTRAVENOUS at 07:01

## 2024-01-01 RX ADMIN — DEXAMETHASONE SODIUM PHOSPHATE 4 MG: 4 INJECTION, SOLUTION INTRA-ARTICULAR; INTRALESIONAL; INTRAMUSCULAR; INTRAVENOUS; SOFT TISSUE at 07:01

## 2024-01-01 RX ADMIN — MORPHINE SULFATE 2 MG: 4 INJECTION, SOLUTION INTRAMUSCULAR; INTRAVENOUS at 08:01

## 2024-01-01 RX ADMIN — CALCIUM CHLORIDE INJECTION 500 MG: 100 INJECTION, SOLUTION INTRAVENOUS at 11:01

## 2024-01-01 RX ADMIN — LISINOPRIL 5 MG: 5 TABLET ORAL at 06:01

## 2024-01-01 RX ADMIN — MUPIROCIN: 20 OINTMENT TOPICAL at 10:01

## 2024-01-01 RX ADMIN — GLYCOPYRROLATE 0.2 MG: 0.2 INJECTION INTRAMUSCULAR; INTRAVENOUS at 07:01

## 2024-01-01 RX ADMIN — FENTANYL CITRATE 50 MCG: 50 INJECTION, SOLUTION INTRAMUSCULAR; INTRAVENOUS at 06:01

## 2024-01-01 RX ADMIN — VASOPRESSIN 0.04 UNITS/MIN: 20 INJECTION INTRAVENOUS at 06:02

## 2024-01-01 RX ADMIN — SODIUM CHLORIDE 1000 ML: 9 INJECTION, SOLUTION INTRAVENOUS at 12:01

## 2024-01-01 RX ADMIN — MORPHINE SULFATE 2 MG: 4 INJECTION, SOLUTION INTRAMUSCULAR; INTRAVENOUS at 04:01

## 2024-01-01 RX ADMIN — METOPROLOL SUCCINATE 50 MG: 50 TABLET, EXTENDED RELEASE ORAL at 04:01

## 2024-01-01 RX ADMIN — SODIUM CHLORIDE 1000 ML: 9 INJECTION, SOLUTION INTRAVENOUS at 01:01

## 2024-01-01 RX ADMIN — SODIUM CHLORIDE: 9 INJECTION, SOLUTION INTRAVENOUS at 12:01

## 2024-01-01 RX ADMIN — MAGNESIUM SULFATE IN WATER 4 G: 40 INJECTION, SOLUTION INTRAVENOUS at 02:01

## 2024-01-01 RX ADMIN — METOPROLOL SUCCINATE 50 MG: 50 TABLET, EXTENDED RELEASE ORAL at 10:01

## 2024-01-01 RX ADMIN — POTASSIUM PHOSPHATE, MONOBASIC POTASSIUM PHOSPHATE, DIBASIC 20 MMOL: 224; 236 INJECTION, SOLUTION, CONCENTRATE INTRAVENOUS at 02:01

## 2024-01-01 RX ADMIN — FUROSEMIDE 40 MG: 10 INJECTION, SOLUTION INTRAMUSCULAR; INTRAVENOUS at 10:01

## 2024-01-01 RX ADMIN — EPINEPHRINE 0.02 MCG/KG/MIN: 1 INJECTION INTRAMUSCULAR; INTRAVENOUS; SUBCUTANEOUS at 05:02

## 2024-01-01 RX ADMIN — SUGAMMADEX 200 MG: 100 INJECTION, SOLUTION INTRAVENOUS at 11:01

## 2024-01-01 RX ADMIN — CEFAZOLIN SODIUM 2 G: 2 SOLUTION INTRAVENOUS at 12:01

## 2024-01-01 RX ADMIN — LOPERAMIDE HYDROCHLORIDE 2 MG: 2 CAPSULE ORAL at 02:01

## 2024-01-01 RX ADMIN — LISINOPRIL 5 MG: 5 TABLET ORAL at 04:01

## 2024-01-01 RX ADMIN — ONDANSETRON 4 MG: 2 INJECTION INTRAMUSCULAR; INTRAVENOUS at 04:01

## 2024-01-11 NOTE — H&P
[facility] General Surgery Clinic Note     HPI: Red Aaron is a 68 y.o. patient with a history of HTN, hyperlipidemia, schizophrenia and PTSD presenting to clinic for 2 week follow-up after CT Abdomen/Pelvic/bilateral LE. CT observed AAA size increase to 6.7x7cm from 5.5x6.4cm on abdominal US (08/31/23). Patient denies any dizziness, headaches, syncopal episodes, SOB, cramping or weakness of extremities. No difficulty with walking long periods of time or distance. Per family patient experienced abdominal pain around 2 weeks ago which they treated with aspirin as well as cold/hot compresses. Patient is also concerned about significant weight loss of 7-8 pounds in the last month and a half without decrease in appetite. Reports smoking 9 cigarettes a week, but no alcohol or other substance use. Family requested to have AAA surgery after the holidays and after his cataract surgery in January.        PMH:        Past Medical History:   Diagnosis Date    Hyperlipidemia      Hypertension      PTSD (post-traumatic stress disorder)      Schizophrenia        Meds:   Current Outpatient Medications:     aspirin 81 MG Chew, Take 81 mg by mouth once daily., Disp: , Rfl:     benztropine (COGENTIN) 1 MG tablet, Take 0.5 mg by mouth 2 (two) times a day., Disp: , Rfl:     gabapentin (NEURONTIN) 300 MG capsule, Take 1 capsule (300 mg total) by mouth nightly., Disp: 90 capsule, Rfl: 1    haloperidol decanoate (HALDOL DECANOATE) 100 mg/mL injection, Inject 1.5 mLs into the muscle every 21 days., Disp: , Rfl:     lisinopriL (PRINIVIL,ZESTRIL) 5 MG Tab, Take 1 tablet (5 mg total) by mouth once daily., Disp: 90 tablet, Rfl: 1    loratadine (CLARITIN) 10 mg tablet, Take 10 mg by mouth once daily., Disp: , Rfl:     metoprolol succinate (TOPROL-XL) 50 MG 24 hr tablet, Take 1 tablet (50 mg total) by mouth once daily., Disp: 90 tablet, Rfl: 1    multivitamin (ONE DAILY MULTIVITAMIN) per tablet, Take 1 tablet by mouth once daily., Disp: , Rfl:      OLANZapine (ZYPREXA) 20 MG tablet, Take 0.5 tablets by mouth 2 (two) times a day., Disp: , Rfl:     rosuvastatin (CRESTOR) 20 MG tablet, Take 1 tablet (20 mg total) by mouth once daily., Disp: 90 tablet, Rfl: 1  Allergies: Review of patient's allergies indicates:  No Known Allergies  Social History:   Social History            Tobacco Use    Smoking status: Every Day       Current packs/day: 0.50       Average packs/day: 0.5 packs/day for 50.9 years (25.5 ttl pk-yrs)       Types: Cigarettes       Start date: 1973    Smokeless tobacco: Former       Types: Chew   Substance Use Topics    Alcohol use: Not Currently       Alcohol/week: 1.0 standard drink of alcohol       Types: 1 Glasses of wine per week    Drug use: Not Currently       Frequency: 2.0 times per week       Types: Marijuana      Family History: No family history on file.  Surgical History:         Past Surgical History:   Procedure Laterality Date    TONSILLECTOMY          Review of Systems:  Negative unless specified in HPI above.     Objective:     Vitals:  There were no vitals filed for this visit.      Physical Exam:  Gen: NAD  Neuro: awake, alert, answering questions appropriately  CV: RRR, 2+ radial, femoral, popliteal pulses, 1+ DP and PT pulses, No carotid bruits auscultated.   Resp: non-labored breathing, MENDY  Abd: soft, ND, NT. Mid abdominal palpable pulse.  Ext: moves all 4 spontaneously and purposefully  Skin: warm, well perfused     Pertinent Labs:  ISTAT Creatinine (11/22/23): 1.5     Imaging:  CTA runoff ABD Pel Bilat Lower Ext (11/22/23):  -There is an abdominal aortic aneurysm seen.  The aneurysm is infrarenal and fusiform in appearance.  Maximum thickness of the aneurysm is 6.7 x 7 cm.  There is a significant amount of mural thrombus.  The celiac axis is patent with some calcified plaque at the origin and approximately a 40% stenosis.  The superior mesenteric artery is patent.  No significant stenosis is seen.  There are single renal  arteries bilaterally.  No significant renal artery stenosis is seen.  The CHEYANNE is not patent.     Aneurysmal dilatation extends into the common iliac artery on the right side.  Maximum size of the common iliac artery on the right side is 2.9 cm.  The right external iliac artery is patent with mild amount atherosclerotic plaque.  No high-grade stenosis is seen.  The internal iliac artery on the right side appears to be occluded.  Some collateral flow is seen in the right pelvis from the left iliac arterial system.  There is evidence of high-grade stenosis in the common iliac artery on the left side with areas of stenosis measuring 60 is 70% due to atherosclerotic plaque.  External iliac artery on the left side has multifocal areas of stenosis measuring 40-50%.  It is patent.     Common femoral artery on the right side is patent with a 40% stenosis secondary to atherosclerotic plaque.  Multifocal areas of plaque are seen scattered throughout the superficial femoral artery on the right side with areas stenosing measuring up to 50-60%.  There is some calcified plaque at the popliteal artery on the right side with high-grade area of stenosis measuring approximately 80% at the level of the knee.  There is calcified plaque at the tibioperoneal trunk but there is three-vessel runoff to the right lower extremity.     The left common femoral artery has some calcified plaque within it with areas of stenosis measuring approximately 40%.  There is 80% stenosis of the proximal superficial femoral artery on the left side with multifocal areas of plaque seen throughout the superficial femoral artery on the left side some measuring 60-70%.  There is calcified plaque in the popliteal artery on the left side with a 70-80% stenosis at the level of the knee.  Calcified plaque is seen in the tibioperoneal trunk but there is three-vessel runoff seen.     Source images: There are changes consistent with COPD and emphysema in the lung bases  bilaterally.  The liver appears normal.  No liver mass or lesion is seen. Gallbladder appears normal. The pancreas appears grossly unremarkable. Spleen appears normal.  The adrenal glands appear normal.  No renal abnormality seen. Visualized portions of the bowel appear grossly unremarkable. Urinary bladder appears normal.     US abdomen (8/31/23):  Transabdominal scanning targeting the aorta.  The abdominal aorta measures up to 2.1 cm proximally, 5.5 cm in its midportion and 6.4 cm distally.  On the prior CT the abdominal aorta measured up to 5.2 cm distally.  Dense atherosclerotic disease within the aorta.      Micro/Path/Other:  None     Assessment/Plan:  Red Aaron is a 68 y.o. patient with a history of HTN, hyperlipidemia, schizophrenia and PTSD presenting to clinic for 2 week follow-up after CT Abdomen/Pelvic/bilateral LE. CT observed AAA size of 6.7x7cm, an increase from 5.5x6.4cm on abdominal US (08/31/23).     -Schedule AAA repair at [facility] 01/11/24     [facility] General Surgery MS3  12/05/2023 12:37 PM      RESIDENT ATTESTATION     I have seen and  evaluated the patient with the student doctor. Agree with assessment and plan with following changes:     Assessment/Plan:  Red Aaron Jr. is a 68 y.o. male with PMHx of HTN, HLD, PTSD, and schizophrenia who presented to clinic for follow up of a AAA after CT Abdomen/pelvis. Abdominal U/s (8/31) showing infrarenal AAA 6.4x5.5 cm, follow up CT abdomen/pelvis (11/22) showing AAA now 6.7x7.0 cm. Patient denies abdominal pain, CP, SOB, claudication, or peripheral neuropathy. Discussed risks and benefits of EVAR vs open aneurysm repair and patient is amenable to open repair. Will schedule for open AAA repair at [facility] for 01/11/2024.     Billy Briggs MD  [facility] General Surgery PGY-1  1:46 PM              Electronically signed by Billy Briggs MD at 12/5/2023  1:52 PM   Revision History       Consuelo Lundberg LPN at 12/5/2023  1:00 PM    Status: Signed    Patient seen by Dr. Gatica. Patient will receive a call form [facility] with SX date, time, and instructions.  Discharge instructions given. Consent to be sign morning of surgery per Dr. Gatica.          Electronically signed by Consuelo Lundberg LPN at 12/5/2023  1:52 PM     Royce Gatica MD at 12/5/2023  1:00 PM    Status: Signed   I have reviewed the notes, assessments, and/or procedures performed by the resident, I concur with her/his documentation of Red Aaron Jr..  Date of Service: 12/5/2023     The patient has a large infrarenal aortic aneurysm.  The aneurysm has been increasing in size since last evaluation.  The patient is currently asymptomatic.  I do not think the patient is a good candidate for an endovascular repair because of a short neck.  I recommend open direct repair of the aneurysm.  We are unable to perform this procedure at [facility] because of lack of cell Saver and need for an intensive care unit that could handle this level acuity of patient care.  I will schedule the patient at [facility] for 11 January 2024.  The procedure was discussed in detail including the risks benefits and alternative therapies.  The patient agrees and wishes to proceed.           Electronically signed by Royce Gatica MD at 12/11/2023  8:32 AM       Interval Note  The patient is doing well without problems.  He denies any new abdominal symptoms.  We will examination the patient has palpable femoral pulses and nontender abdomen.  The patient has a large infrarenal abdominal aortic aneurysm.  He has a candidate for aortoiliac bypass/aortic aneurysm repair with an open approach.  Procedure was discussed in detail including the risks benefits alternative therapies.  He agrees and wished to proceed.

## 2024-01-11 NOTE — OP NOTE
Open AAA Operative Note    Patient Name: Red Aaron Jr.  Age: 68 y.o.  Sex: male  YOB: 1955  MRN: [id number]  Author: Royce Gatica  Location: [unfilled]    Pre Op Dx: Infrarenal abdominal aortic aneurysm (AAA) without rupture [I71.43]    Post Op Dx:  Post-Op Diagnosis Codes:     * Infrarenal abdominal aortic aneurysm (AAA) without rupture [I71.43]     Procedure performed:  Open AAA Repair    Surgeon: Royce Gatica    Assistant: THERESA Chong    Anesthesia Type:  general    Specimens:    Specimens (From admission, onward)      None            Estimated Blood Loss:  1550cc    Indications:  The patient has a large infrarenal abdominal aortic aneurysm.  He has a candidate for open repair.  The aneurysm is juxtarenal and we will need to clamp above the renals.  The iliacs are aneurysm on the right and stenotic on the left.  We will need to perform an aorto bi-iliac bypass.    Findings:  The suprarenal aorta was soft and a clamp over the vessel.  Renals were patent.  We are able to sew just below the renal arteries bilaterally.  The right common iliac was aneurysmal and we bypass to the internal external iliac artery bifurcation.  The left iliac was severely diseased.  There was more disease in the left side that I was able to appreciate from the CT scan.  Performed a long anastomosis onto the external iliac artery but at the end the case, there was a palpable femoral pulse.  We used a 16 x 8 bifurcated Dacron graft.  The CHEYANNE was occluded.  The left renal vein was retrocaval.    Procedure in detail:     The patient was placed on the table in the supine position. The patient was prepped and draped from the nipples to the knees in a standard sterile technique.  Attention was then directed towards the abdomen. A midline incision was made. The abdomen was entered. No abnormal pathology was encountered. The retroperitoneum was then exposed. An incision into the retroperitoneum was  made exposing the aorta and iliac bifurcations.  The patient was fully anticoagulated.  Control of both renal arteries was obtained.  The patient was given Lasix and mannitol.  The aorta was cross clamped above the level of the renal arteries.  The common iliac arteries were clamped.  Back bleeders were ligated with silk sutures.  The mural thrombus was removed.  A Dacron graft was sewn end to end to the aortic stump. Good hemostasis was noted.  The graft was sewn end-to-side to the distal common iliac arteries bilaterally.. Any possible debris was flushed and flow was restored to the lower extremities. Good hemostasis was noted.  The aneurysm sac was closed over the graft.  The retroperitoneum was then re approximated using a Vicryl suture. The abdominal contents were placed back into their normal position. Inspection of the sigmoid colon showed no evidence of ischemia.  The fascia was closed using a looped #1 PDS suture. Skin was closed using 3-0 Vicryl's and 4-0 Monocryl sutures. Sterile dressings were applied. The patient tolerated the procedure would recover in stable condition. At the end of the case the lap instrument and needle counts were accurate.    Notes:  The patient had Doppler pedal signals at the end the case.  He was sent to the ICU intubated because he was still lethargic immediately in the recovery room.  The blood pressure was stable without pressors.  He would good urine output after the procedure and after the aortic clamp was removed.

## 2024-01-11 NOTE — ANESTHESIA POSTPROCEDURE EVALUATION
Anesthesia Post Evaluation    Patient: Red Aaron JrAbhishek    Procedure(s) Performed: Procedure(s) (LRB):  CREATION, BYPASS, ARTERIAL, AORTA TO FEMORAL, BILATERAL (N/A)    Final Anesthesia Type: general      Patient location during evaluation: PACU  Patient participation: Yes- Able to Participate  Level of consciousness: awake and alert and oriented  Post-procedure vital signs: reviewed and stable  Pain management: adequate  Airway patency: patent  MARILOU mitigation strategies: Verification of full reversal of neuromuscular block  PONV status at discharge: No PONV  Anesthetic complications: no      Cardiovascular status: blood pressure returned to baseline and stable  Respiratory status: spontaneous ventilation and unassisted  Hydration status: euvolemic  Follow-up not needed.  Comments: [facility]              Vitals Value Taken Time   /88 01/11/24 1646   Temp 36.9 °C (98.4 °F) 01/11/24 1600   Pulse 74 01/11/24 1659   Resp 23 01/11/24 1659   SpO2 100 % 01/11/24 1659   Vitals shown include unvalidated device data.      No case tracking events are documented in the log.      Pain/Jules Score: No data recorded

## 2024-01-11 NOTE — H&P
[facility] - Periop Services  Pulmonary Critical Care Note    Patient Name: Red Manuel Jr.  MRN: [id number]  Admission Date: 1/11/2024  Hospital Length of Stay: 0 days  Code Status: No Order  Attending Provider: Royce Gatica,*  Primary Care Provider: Rip Ansari MD     Subjective:     HPI:   This is a 69 yo male with PMH of HTN, HLD, Scizophrenia, Seizure disorder, and AAA who presents to ICU post open direct repair of infrarenal AAA. Patient to OR on 1/11/23 with vascular surgery for repair of his AAA. Received 2 u pRBC transfusion intra-op. Presents to ICU for post-operative monitoring. Presents to ICU intubated, mechanically ventilated, and sedated.    Hospital Course/Significant events:  1/11/23 - open, direct AAA repair    24 Hour Interval History:  pending    Past Medical History:   Diagnosis Date    Abdominal pain     Digestive disorder     acid indigestion    Hyperlipidemia     Hypertension     Infrarenal abdominal aortic aneurysm (AAA) without rupture     PTSD (post-traumatic stress disorder)     Schizophrenia     Seizures     last seizure 8 years ago       Past Surgical History:   Procedure Laterality Date    COLONOSCOPY      TONSILLECTOMY         Social History     Socioeconomic History    Marital status:      Spouse name: mariluz manuel    Number of children: 9    Years of education: 12   Tobacco Use    Smoking status: Every Day     Current packs/day: 0.50     Average packs/day: 0.5 packs/day for 51.0 years (25.5 ttl pk-yrs)     Types: Cigarettes     Start date: 1973    Smokeless tobacco: Former     Types: Chew   Substance and Sexual Activity    Alcohol use: Not Currently     Alcohol/week: 1.0 standard drink of alcohol     Types: 1 Glasses of wine per week    Drug use: Not Currently     Frequency: 2.0 times per week     Types: Marijuana    Sexual activity: Yes     Partners: Male     Social Determinants of Health     Financial Resource Strain: Low Risk  (5/23/2023)     Overall Financial Resource Strain (CARDIA)     Difficulty of Paying Living Expenses: Not hard at all   Food Insecurity: No Food Insecurity (5/23/2023)    Hunger Vital Sign     Worried About Running Out of Food in the Last Year: Never true     Ran Out of Food in the Last Year: Never true   Transportation Needs: No Transportation Needs (5/23/2023)    PRAPARE - Transportation     Lack of Transportation (Medical): No     Lack of Transportation (Non-Medical): No   Physical Activity: Sufficiently Active (5/23/2023)    Exercise Vital Sign     Days of Exercise per Week: 5 days     Minutes of Exercise per Session: 30 min   Stress: Stress Concern Present (5/23/2023)    [employer] [place] [place] [employer] - Occupational Stress Questionnaire     Feeling of Stress : To some extent   Social Connections: Moderately Integrated (5/23/2023)    Social Connection and Isolation Panel [NHANES]     Frequency of Communication with Friends and Family: More than three times a week     Frequency of Social Gatherings with Friends and Family: Twice a week     Attends Jain Services: More than 4 times per year     Active Member of Clubs or Organizations: No     Attends Club or Organization Meetings: Never     Marital Status:    Housing Stability: Unknown (5/23/2023)    Housing Stability Vital Sign     Unable to Pay for Housing in the Last Year: No     Unstable Housing in the Last Year: No           Current Outpatient Medications   Medication Instructions    aspirin 81 mg, Oral, Daily    benztropine (COGENTIN) 0.5 mg, Oral, 2 times daily    gabapentin (NEURONTIN) 300 mg, Oral, Nightly    haloperidol decanoate (HALDOL DECANOATE) 100 mg/mL injection 1.5 mLs, Intramuscular, Every 21 days    INV lisinopril (PRINIVIL,ZESTRIL) 5 mg, Oral, Daily    loratadine (CLARITIN) 10 mg, Oral, Daily    metoprolol succinate (TOPROL-XL) 50 mg, Oral, Daily    multivitamin (ONE DAILY MULTIVITAMIN) per tablet 1 tablet, Oral, Daily    OLANZapine  (ZYPREXA) 20 MG tablet 0.5 tablets, Oral, 2 times daily    rosuvastatin (CRESTOR) 20 mg, Oral, Daily       Current Inpatient Medications      Current Intravenous Infusions        ROS   An accurate ROS was unable to be obtained as patient is intubated, sedated    Objective:       Intake/Output Summary (Last 24 hours) at 1/11/2024 1131  Last data filed at 1/11/2024 1122  Gross per 24 hour   Intake 3247 ml   Output 2350 ml   Net 897 ml         Vital Signs (Most Recent):  Temp: 98.4 °F (36.9 °C) (01/11/24 0551)  Pulse: 70 (01/11/24 0551)  Resp: 17 (01/11/24 0551)  BP: 120/74 (01/11/24 0551)  SpO2: 100 % (01/11/24 0551)  Body mass index is 19.3 kg/m².  Weight: 55.9 kg (123 lb 3.8 oz) Vital Signs (24h Range):  Temp:  [98.4 °F (36.9 °C)] 98.4 °F (36.9 °C)  Pulse:  [70] 70  Resp:  [17] 17  SpO2:  [100 %] 100 %  BP: (120)/(74) 120/74     Physical Exam  General: Patient resting in bed, in no acute distress, intubated, sedated  Eye: PERRL, clear conjunctiva, eyelids normal  HENT: Head-normocephalic and atraumatic, ET tube in place  Neck: trachea midline, no gross thyromegaly  Respiratory:  Mechanically ventilated, without wheezes, without rales, without rhonchi  Cardiovascular: regular rate and rhythm without murmurs.  No gallops or rubs no JVD.  Capillary refill within normal limits.  Gastrointestinal: soft, non-distended  Musculoskeletal:  No gross deformities  Integumentary: no rashes or skin lesions present  Neurologic:  Intubated, sedated      Lines/Drains/Airways       Drain  Duration                  Urethral Catheter 01/11/24 Non-latex;Silicone 16 Fr. <1 day              Airway  Duration                  Airway - Non-Surgical 01/11/24 0725 <1 day              Arterial Line  Duration             Arterial Line 01/11/24 0645 Right Radial <1 day              Peripheral Intravenous Line  Duration                  Peripheral IV - Single Lumen 01/11/24 0605 18 G Anterior;Distal;Right Upper Arm <1 day         Peripheral IV -  "Single Lumen 01/11/24 0729 18 G Left Forearm <1 day                    Significant Labs:    Lab Results   Component Value Date    WBC 8.16 01/10/2024    HGB 11.8 (L) 01/10/2024    HCT 38.4 (L) 01/10/2024    MCV 96.7 (H) 01/10/2024     01/10/2024           BMP  Lab Results   Component Value Date     01/10/2024    K 4.9 01/10/2024    CHLORIDE 107 01/10/2024    CO2 27 01/10/2024    BUN 8.9 01/10/2024    CREATININE 1.20 (H) 01/10/2024    CALCIUM 9.2 01/10/2024    AGAP 5.0 01/10/2024    ESTGFRAFRICA >60 10/23/2019    ESTGFRAFRICA >60 10/23/2019    EGFRNONAA 39 02/19/2022         ABG  No results for input(s): "PH", "PO2", "PCO2", "HCO3", "POCBASEDEF" in the last 168 hours.    Mechanical Ventilation Support:         Significant Imaging:  I have reviewed the pertinent imaging within the past 24 hours.        Assessment/Plan:     Assessment  Infrarenal AAA  - s/p open direct repair on 1/11/23  HTN  HLD  Hx schizophrenia/Bipolar  Hx of seizures  Tobacco dependence  Hx of polysubstance abuse      Plan  - admit to ICU for close hemodynamic monitoring post-op  - vascular surgery following, appreciate their assistance  - transfuse for hgb <7, serial H&H  -continue sedation for goal RASS of -2  - continue telemetry  - restart home psych medications, on Haldol qmonthly, zyprexa 10 mg BID, Cogentin 0.5 mg BID  - restart home antihypertensives as hemodynamics allow  - will offer nicotine patch         DVT Prophylaxis: SCDs  GI Prophylaxis: none     32 minutes of critical care was time spent personally by me on the following activities: development of treatment plan with patient or surrogate and bedside caregivers, discussions with consultants, evaluation of patient's response to treatment, examination of patient, ordering and performing treatments and interventions, ordering and review of laboratory studies, ordering and review of radiographic studies, pulse oximetry, re-evaluation of patient's condition.  This critical " care time did not overlap with that of any other provider or involve time for any procedures.     Ellen Maier, DO  Pulmonary Critical Care Medicine  [facility] - [facility]  DOS: 01/11/2024

## 2024-01-11 NOTE — ANESTHESIA PROCEDURE NOTES
Arterial    Diagnosis: infrarenal AA    Patient location during procedure: holding area  Timeout: 1/11/2024 6:45 AM  Procedure end time: 1/11/2024 6:50 AM    Staffing  Authorizing Provider: Ganesh Shipley MD  Performing Provider: Wu Garland CRNA    Staffing  Performed by: Wu Garland CRNA  Authorized by: Ganesh Shipley MD    Anesthesiologist was present at the time of the procedure.    Preanesthetic Checklist  Completed: patient identified, IV checked, site marked, risks and benefits discussed, surgical consent, monitors and equipment checked, pre-op evaluation, timeout performed and anesthesia consent givenArterial  Skin Prep: chlorhexidine gluconate  Local Infiltration: lidocaine  Orientation: right  Location: radial    Catheter Size: 20 G  Catheter placement by Anatomical landmarks. Heme positive aspiration all ports. Insertion Attempts: 1  Assessment  Dressing: secured with tape and tegaderm  Patient: Tolerated well

## 2024-01-11 NOTE — ANESTHESIA PREPROCEDURE EVALUATION
01/11/2024  Red Aaron Jr. is a 68 y.o., male.  Procedure Information    Case: [id number] Date/Time: 01/11/24 0645   Procedure: CREATION, BYPASS, ARTERIAL, AORTA TO FEMORAL, BILATERAL - OPEN AAA REPAIR   Anesthesia type: General   Diagnosis: Infrarenal abdominal aortic aneurysm (AAA) without rupture [I71.43]   Pre-op diagnosis: Infrarenal abdominal aortic aneurysm (AAA) without rupture [I71.43]   Location: [facility] OR [address] [place] [address] OR   Surgeons: Royce Gatica MD       Pre-op Assessment    I have reviewed the Patient Summary Reports.     I have reviewed the Nursing Notes. I have reviewed the NPO Status.   I have reviewed the Medications.     Review of Systems  Anesthesia Hx:  No problems with previous Anesthesia                Hematology/Oncology:  Hematology Normal   Oncology Normal                                   EENT/Dental:  EENT/Dental Normal           Cardiovascular:  Exercise tolerance: good   Hypertension   CAD       Denies Angina.       Denies GONCALVES.    Functional Capacity good / => 4 METS                         Pulmonary:  Pulmonary Normal                       Renal/:   Denies Chronic Renal Disease.                Hepatic/GI:  Hepatic/GI Normal                 Musculoskeletal:  Musculoskeletal Normal                Neurological:       Seizures                                Endocrine:  Endocrine Normal          Denies Morbid Obesity / BMI > 40  Dermatological:  Skin Normal    Psych:  Psychiatric History anxiety  schizo               Physical Exam  General: Alert, Oriented, Well nourished and Cooperative    Airway:  Mallampati: II   Mouth Opening: Normal  TM Distance: Normal  Tongue: Normal  Neck ROM: Normal ROM    Dental:  Intact    Chest/Lungs:  Clear to auscultation, Normal Respiratory Rate    Heart:  Rate: Normal  Rhythm: Regular Rhythm       Latest Reference Range & Units Most Recent    WBC 4.50 - 11.50 x10(3)/mcL 8.16  1/10/24 09:51   RBC 4.70 - 6.10 x10(6)/mcL 3.97 (L)  1/10/24 09:51   Hemoglobin 14.0 - 18.0 g/dL 11.8 (L)  1/10/24 09:51   Hematocrit 42.0 - 52.0 % 38.4 (L)  1/10/24 09:51   MCV 80.0 - 94.0 fL 96.7 (H)  1/10/24 09:51   MCH 27.0 - 31.0 pg 29.7  1/10/24 09:51   MCHC 33.0 - 36.0 g/dL 30.7 (L)  1/10/24 09:51   RDW 11.5 - 17.0 % 15.2  1/10/24 09:51   Platelet Count 130 - 400 x10(3)/mcL 160  1/10/24 09:51   MPV 7.4 - 10.4 fL 11.5 (H)  1/10/24 09:51   Gran % 38.0 - 73.0 %  38.0 - 73.0 % 60.6  10/23/19 10:15  60.6  10/23/19 10:15   Neut % % 58.8  1/10/24 09:51   Lymph % 18.0 - 48.0 %  18.0 - 48.0 % 30.0  10/23/19 10:15  30.0  10/23/19 10:15   LYMPH % % 27.8  1/10/24 09:51   Mono % % 7.6  1/10/24 09:51   Eosinophil % % 4.8  1/10/24 09:51   Basophil % % 0.9  1/10/24 09:51   Immature Granulocytes % 0.1  1/10/24 09:51   Gran # (ANC) 2.10 - 9.20  12.01  2/19/22 15:27   Neut # 2.1 - 9.2 x10(3)/mcL 4.80  1/10/24 09:51   Lymph # 0.6 - 4.6 x10(3)/mcL 2.27  1/10/24 09:51   Mono # 0.1 - 1.3 x10(3)/mcL 0.62  1/10/24 09:51   Eos # 0 - 0.9 x10(3)/mcL 0.39  1/10/24 09:51   Baso # <=0.2 x10(3)/mcL 0.07  1/10/24 09:51   Immature Grans (Abs) 0 - 0.04 x10(3)/mcL 0.01  1/10/24 09:51   nRBC % 0.0  1/10/24 09:51   Differential Method  Automated  10/23/19 10:15  Automated  10/23/19 10:15   Protime 11.9 - 14.4 second(s) 13.7  10/27/21 17:55   INR 0.90 - 1.20  1.07  10/27/21 17:55   aPTT 24.2 - 33.9 second(s) 22.1 (L)  2/16/20 15:23   Sodium 136 - 145 mmol/L 139  1/10/24 09:51   Potassium 3.5 - 5.1 mmol/L 4.9  1/10/24 09:51   Chloride 98 - 107 mmol/L 107  1/10/24 09:51   CO2 23 - 31 mmol/L 27  1/10/24 09:51   Anion Gap mEq/L 5.0  1/10/24 09:51   BUN 8.4 - 25.7 mg/dL 8.9  1/10/24 09:51   Creatinine 0.73 - 1.18 mg/dL 1.20 (H)  1/10/24 09:51   BUN/CREAT RATIO  7  1/10/24 09:51   eGFR mls/min/1.73/m2 >60  1/10/24 09:51   eGFR if non African American  39  2/19/22 15:27   eGFR if   47  2/19/22 15:27    Glucose 82 - 115 mg/dL 102  1/10/24 09:51   Calcium 8.8 - 10.0 mg/dL 9.2  1/10/24 09:51   Phosphorus Level 2.3 - 4.7 mg/dL 3.3  10/27/21 00:38   Magnesium  1.60 - 2.60 mg/dL 1.90  10/27/21 12:32   ALP 40 - 150 unit/L 70  10/17/23 11:30   PROTEIN TOTAL 5.8 - 7.6 gm/dL 8.0 (H)  10/17/23 11:30   Albumin 3.4 - 4.8 g/dL 3.6  10/17/23 11:30   Albumin/Globulin Ratio 1.1 - 2.0 ratio 0.8 (L)  10/17/23 11:30   BILIRUBIN TOTAL <=1.5 mg/dL 0.3  10/17/23 11:30   Bilirubin Direct 0.0 - 0.5  0.3  2/19/22 15:27   Bilirubin, Indirect 0.00 - 0.80  0.40  2/19/22 15:27   AST 5 - 34 unit/L 24  10/17/23 11:30   ALT 0 - 55 unit/L 17  10/17/23 11:30   Ammonia 18.0 - 72.0 umol/L 30.8  10/27/21 00:38   Globulin, Total 2.4 - 3.5 gm/dL 4.4 (H)  10/17/23 11:30   Liver Profile Interp  Increase in AST with normal ALT is suggestive of non-hepatic origin.  Major non-hepatic sources of AST include myocardium, red blood cells, kidneys, brain, and skeletal muscle.  The possible significant of this elevated AST is best considered by   correlation with clinical findings.   Hypoproteinemia and hypoalbuminemia.      Alma Delia Olivares M.D.    2/19/20 05:08   Cholesterol Total <=200 mg/dL 107  10/17/23 11:30   HDL 35 - 60 mg/dL 33 (L)  10/17/23 11:30   HDL/Cholesterol Ratio 20.0 - 50.0 %  20.0 - 50.0 % 43.0  10/23/19 10:15  43.0  10/23/19 10:15   Non-HDL Cholesterol mg/dL  mg/dL 86  10/23/19 10:15  86  10/23/19 10:15   Total Cholesterol/HDL Ratio 0 - 5  3  10/17/23 11:30   Triglycerides 34 - 140 mg/dL 65  10/17/23 11:30   LDL Cholesterol 50.00 - 140.00 mg/dL 61.00  10/17/23 11:30   Very Low Density Lipoprotein  13  10/17/23 11:30   CPK 39 - 308 unit/L 954 (H)  2/19/20 05:08   CPK MB 0.5 - 3.6 ng/mL 21.2 (H)  2/16/20 18:49   Troponin I 0.000 - 0.045 ng/mL 0.022  10/27/21 12:32   Hemoglobin A1C External <=7.0 % 5.4  10/17/23 11:30   Estimated Avg Glucose mg/dL 108.3  10/17/23 11:30   TSH 0.3500 - 4.9400 uIU/mL 1.2064  10/28/21 09:44   Free T4 0.70 - 1.48  ng/dL 1.30  10/28/21 09:44   Prostate Specific Antigen <=4.00 ng/mL 2.04  7/17/23 11:55   Acetaminophen (Tylenol), Serum 10.0 - 30.0 ug/ml <17.4  10/27/21 00:38   Salicylate Lvl mg/dL <5.0  10/27/21 00:38   Alcohol, Serum  <10.0  2/19/22 15:27   Benzodiazepine Screen, Urine >Negative  Negative  10/27/21 00:32   Phencyclidine >Negative  Negative  10/27/21 00:32   Cocaine (Metab.) >Negative  Negative  10/27/21 00:32   Opiate Scrn, Ur >Negative  Negative  10/27/21 00:32   Barbiturate Screen, Ur >Negative  Negative  10/27/21 00:32   Amphetamine Screen, Ur >Negative  Negative  10/27/21 00:32   Cannabinoids, Urine >Negative  Positive !  10/27/21 00:32   MDMA, Urine >Negative  Negative  10/27/21 00:32   Methadone, Urine >Negative  Negative  10/27/21 00:32   Hep A IgM >Nonreactive  Nonreactive  10/28/21 09:44   Hep B C IgM >Nonreactive  Nonreactive  10/28/21 09:44   Hepatitis B Surface Antigen  Nonreactive  10/28/21 09:44   Hepatitis C Ab >Nonreactive  Nonreactive  10/28/21 09:44   HIV Nonreactive  Nonreactive  10/17/23 11:30   RPR Non-Reactive  Non-Reactive  10/17/23 11:30   Syphilis Antibody Nonreactive, Equivocal  Reactive !  10/17/23 11:30   Specimen UA  Urine, Clean Catch  10/23/19 11:20   Color, UA >YELLOW  LIGHT YELLOW  10/27/21 15:20   Appearance, UA >Clear  Clear  10/27/21 15:20   Specific Gravity, UA 1.005 - 1.030  1.015  10/23/19 11:20   Specific Gravity,UA 1.005 - 1.030  1.003 (L)  10/27/21 15:20   pH, UA 4.5 - 8.0  5.5  10/27/21 15:20   Protein, UA >Negative  Negative  10/27/21 15:20   Glucose, UA >Negative  Negative  10/27/21 15:20   Ketones, UA >Negative  Negative  10/27/21 15:20   Occult Blood UA >Negative  0.5  10/27/21 15:20   NITRITE UA >Negative  Negative  10/27/21 15:20   UROBILINOGEN UA >NORMAL  Normal  10/27/21 15:20   Bilirubin (UA) >Negative  Negative  10/27/21 15:20   Leukocytes, UA >Negative Susan/uL 250 !  10/27/21 15:20   RBC, UA >0 - 2 /HPF 11-25 !  10/27/21 15:20   WBC, UA >0 - 2 /HPF 11-25  !  10/27/21 15:20   Bacteria, UA >None Seen /HPF None Seen  10/27/21 15:20   Squam Epithel, UA >0 - 4 /HPF 0-4  7/3/21 09:43   Squamous Epithelial Cells, UA >1 - 2 /LPF  !  10/27/21 15:20   Hyaline Casts, UA >0 /LPF 3-5 !  10/27/21 15:20   Mucous, UA >None Seen  Trace !  11/19/19 10:42   BLOOD CULTURE OLG  Rpt  10/27/21 16:14  Rpt  10/27/21 16:14   CULTURE, URINE  Rpt  10/27/21 15:20   POCT Glucose 70 - 110 mg/dL 145 (H)  10/23/19 10:07   Sample  unknown  11/22/23 11:42   POC PTWBT 9.7 - 14.3 sec 12.3  10/23/19 10:10   POC PTINR 0.9 - 1.2  1.0  10/23/19 10:10   POC Creatinine 0.5 - 1.4 mg/dL 1.5 (H)  11/22/23 11:42   POC Troponin 0.00 - 0.08 ng/mL 0.05  2/16/20 15:27   CT HEAD WITHOUT CONTRAST  Rpt  2/19/22 16:18   CTA ABDOMEN AND PELVIS  Rpt  1/4/17 13:22   CTA RUNOFF ABD PEL BILAT LOWER EXT  Rpt  11/22/23 13:02   CTA HEAD AND NECK (XPD)  Rpt  10/23/19 12:02   XR CHEST AP PORTABLE  Rpt  10/23/19 10:32   XR CHEST PA AND LATERAL  Rpt  1/10/24 10:57   US ABDOMINAL AORTA  Rpt  8/31/23 07:38   EKG 12-LEAD  Rpt  1/10/24 09:47   Treponema pallidum Ab by TP-PA Non Reactive  Reactive !  10/17/23 11:30   CARDIOLOGY REPORT  Rpt  2/17/20 05:37   Restick  Done  7/3/21 10:11   SARS Coronavirus 2 Antigen >NEGATIVE  NEGATIVE  10/27/21 16:35   SARS CORONAVIRUS 2 ANTIGEN POCT, MANUAL READ  Rpt  10/27/21 16:35   Specific Gravity Urine Automated 1.001 - 1.035  1.011  7/3/21 09:43   Temperature, Urine 20.0 - 25.0 DegC 20.0  11/19/19 10:42   UA WBC MAN >0 - 2  None Seen  7/3/21 09:43   (L): Data is abnormally low  (H): Data is abnormally high  !: Data is abnormal  Rpt: View report in Results Review for more information     est Reason : I71.40,    Vent. Rate : 054 BPM     Atrial Rate : 054 BPM     P-R Int : 122 ms          QRS Dur : 068 ms      QT Int : 426 ms       P-R-T Axes : 043 053 060 degrees     QTc Int : 403 ms    Sinus bradycardia  Otherwise normal ECG  When compared with ECG of 23-OCT-2019 10:08,  Vent. rate has decreased  BY  76 BPM    Referred By:             Confirmed By:       Specimen Collected: 01/10/24 09:47 CST Last Resulted: 01/10/24 10:25 CST                  Anesthesia Plan  Type of Anesthesia, risks & benefits discussed:    Anesthesia Type: Gen ETT  Intra-op Monitoring Plan: Standard ASA Monitors and Art Line  Post Op Pain Control Plan: multimodal analgesia  Induction:  IV and Inhalation  Airway Plan: Direct  Informed Consent: Informed consent signed with the Patient and all parties understand the risks and agree with anesthesia plan.  All questions answered. Patient consented to blood products? Yes  ASA Score: 3  Day of Surgery Review of History & Physical: H&P Update referred to the surgeon/provider.I have interviewed and examined the patient. I have reviewed the patient's H&P dated: There are no significant changes.   Anesthesia Plan Notes: TYPE AND SCREEN 2 U PRBCS, TWO LG BORE IVS, TENNILLE    Ready For Surgery From Anesthesia Perspective.     .

## 2024-01-11 NOTE — ANESTHESIA PROCEDURE NOTES
Intubation    Date/Time: 1/11/2024 7:25 AM    Performed by: Wu Garland CRNA  Authorized by: Ganesh Shipley MD    Intubation:     Induction:  Intravenous    Intubated:  Postinduction    Mask Ventilation:  Easy mask    Attempts:  1    Attempted By:  CRNA    Method of Intubation:  Direct    Blade:  Varela 2    Laryngeal View Grade: Grade I - full view of cords      Difficult Airway Encountered?: No      Complications:  None    Airway Device:  Oral endotracheal tube    Airway Device Size:  7.5    Style/Cuff Inflation:  Cuffed    Inflation Amount (mL):  10    Tube secured:  22    Secured at:  The lips    Placement Verified By:  Capnometry    Complicating Factors:  None    Findings Post-Intubation:  BS equal bilateral and atraumatic/condition of teeth unchanged

## 2024-01-11 NOTE — TRANSFER OF CARE
"Anesthesia Transfer of Care Note    Patient: Red Aaron Jr.    Procedure(s) Performed: Procedure(s) (LRB):  CREATION, BYPASS, ARTERIAL, AORTA TO FEMORAL, BILATERAL (N/A)    Patient location: ICU    Anesthesia Type: general    Transport from OR: Continuous CVP monitoring in transport. Continuos invasive BP monitoring in transport. Continuous SpO2 monitoring in transport. Continuous ECG monitoring in transport. Upon arrival to PACU/ICU, patient attached to ventilator and auscultated to confirm bilateral breath sounds and adequate TV. Transported from OR intubated on 100% O2 by AMBU with adequate controlled ventilation    Post pain: adequate analgesia    Post assessment: no apparent anesthetic complications    Post vital signs: stable    Level of consciousness: sedated    Nausea/Vomiting: no nausea/vomiting    Complications: none    Transfer of care protocol was followed      Last vitals: Visit Vitals  /74   Pulse 70   Temp 36.9 °C (98.4 °F) (Oral)   Resp 17   Ht 5' 7" (1.702 m)   Wt 55.9 kg (123 lb 3.8 oz)   SpO2 100%   BMI 19.30 kg/m²     "

## 2024-01-12 NOTE — PROGRESS NOTES
[facility] - [facility] ICU  Pulmonary/Critical Care  Progress Note  2024    Patient Name: Red Aaron Jr.  MRN: [id number]  Admission Date: 2024  Code Status: No Order      Subjective:     HPI:  The patient is a 68-year-old male who has a history of schizophrenia, hypertension, chronic seizure disorder, and abdominal aortic aneurysm.  There were admitted to ICU postoperative open repair of infrarenal AAA on 2023.  Patient remained intubated on mechanical ventilatory support postoperative on arrival to ICU.    Hospital Course:  2024:  Open repair of infrarenal AAA    24hr Interval History:  I have extensively reviewed this patient's hospital stay thus far, as being seen by me for the 1st time this a.m..  He was afebrile.  Intake 7260 cc, output 4225 cc over the previous 24 hours.  Urine output currently around 50 cc/hour.  He remains sinus rhythm postoperative.  He has been having problems with episodic sudden onset hypotension into the 50s systolic blood pressures, last around 6:00 p.m. last p.m..  He was currently on Levophed infusion at 0.13 microgram/kilogram per minute.  He remains off all sedation since 2:00 p.m. yesterday, remains awake and alert.    Scheduled Medications:   benztropine  0.5 mg Oral BID    gabapentin  300 mg Oral Nightly    mupirocin   Nasal BID    OLANZapine  10 mg Oral BID    potassium bicarbonate  25 mEq Oral Once     PRN Medications:  0.9%  NaCl infusion (for blood administration), [] fentaNYL **FOLLOWED BY** fentaNYL, morphine, ondansetron  Continuous Infusions:   sodium chloride 0.9% 125 mL/hr at 24 0438    clevidipine      NORepinephrine bitartrate-D5W 0.35 mcg/kg/min (24 0441)    propofoL Stopped (24 1405)       Past Medical History:   Diagnosis Date    Abdominal pain     Digestive disorder     acid indigestion    Hyperlipidemia     Hypertension     Infrarenal abdominal aortic aneurysm (AAA) without rupture     PTSD  (post-traumatic stress disorder)     Schizophrenia     Seizures     last seizure 8 years ago       Past Surgical History:   Procedure Laterality Date    COLONOSCOPY      TONSILLECTOMY         Objective:     Input/output:    Intake/Output Summary (Last 24 hours) at 1/12/2024 0725  Last data filed at 1/12/2024 0603  Gross per 24 hour   Intake 7257.47 ml   Output 4225 ml   Net 3032.47 ml       Vital Signs (Most Recent):  Temp: 98.1 °F (36.7 °C) (01/12/24 0550)  Pulse: 100 (01/12/24 0600)  Resp: 15 (01/12/24 0600)  BP: (!) 148/74 (01/12/24 0600)  SpO2: 100 % (01/12/24 0600)  Body mass index is 19.3 kg/m².  Weight: 55.9 kg (123 lb 3.8 oz) Vital Signs (24h Range):  Temp:  [97.9 °F (36.6 °C)-99.4 °F (37.4 °C)] 98.1 °F (36.7 °C)  Pulse:  [] 100  Resp:  [10-30] 15  SpO2:  [83 %-100 %] 100 %  BP: ()/() 148/74  Arterial Line BP: ()/(32-89) 123/56     Physical Exam  Constitutional:       Appearance: Normal appearance.      Comments: Intubated orally, appears comfortable   HENT:      Mouth/Throat:      Comments: ET secured to external os  Eyes:      Conjunctiva/sclera: Conjunctivae normal.      Pupils: Pupils are equal, round, and reactive to light.   Cardiovascular:      Rate and Rhythm: Normal rate and regular rhythm.      Heart sounds: No murmur heard.  Pulmonary:      Breath sounds: No wheezing, rhonchi or rales.   Abdominal:      General: Bowel sounds are normal. There is no distension.      Palpations: Abdomen is soft.   Musculoskeletal:      Right lower leg: No edema.      Left lower leg: No edema.   Skin:     General: Skin is warm and dry.      Comments: 1 to 2+ dorsalis pedis pulses bilateral   Neurological:      Mental Status: He is alert.      Comments: He was awake and alert, moves upper and lower extremities equal         Lines/Drains/Airways       Drain  Duration                  Urethral Catheter 01/11/24 Non-latex;Silicone 16 Fr. 1 day         NG/OG Tube 01/11/24 1200 Left nostril <1 day               Airway  Duration                  Airway - Non-Surgical 01/11/24 0725 1 day              Arterial Line  Duration             Arterial Line 01/11/24 0645 Right Radial 1 day              Peripheral Intravenous Line  Duration                  Peripheral IV - Single Lumen 01/11/24 0605 18 G Anterior;Distal;Right Upper Arm 1 day         Peripheral IV - Single Lumen 01/11/24 0729 18 G Left Forearm <1 day                    Vent:  Vent Mode: A/C (01/12/24 0521)  Ventilator Initiated: Yes (01/11/24 1211)  Set Rate: 12 BPM (01/12/24 0521)  Vt Set: 500 mL (01/12/24 0521)  Pressure Support: 10 cmH20 (01/12/24 0521)  PEEP/CPAP: 5 cmH20 (01/12/24 0521)  Oxygen Concentration (%): 30 (01/12/24 0521)  Peak Airway Pressure: 14 cmH20 (01/12/24 0521)  Total Ve: 6 L/m (01/12/24 0521)  F/VT Ratio<105 (RSBI): (!) 42.17 (01/11/24 1620)    ABGs:  Lab Results   Component Value Date    PH 7.350 01/12/2024    PO2 104.0 (H) 01/12/2024    PCO2 44.0 01/12/2024    POCSATURATED 100 01/11/2024         Significant Labs:    Lab Results   Component Value Date    WBC 13.56 (H) 01/12/2024    HGB 8.9 (L) 01/12/2024    HCT 27.6 (L) 01/12/2024    MCV 93.6 01/12/2024     (L) 01/12/2024         Recent Labs   Lab 01/11/24  1504 01/12/24  0206    139   K 4.0 4.4   CO2 21* 22*   BUN 12.6 14.0   CREATININE 1.11 1.16   CALCIUM 8.8 7.5*   MG  --  1.60   AST 20 17   ALT 9 5   ALKPHOS 44 39*   ALBUMIN 3.2* 2.6*   TROPONINI 0.041  --      Imaging:   Chest x-ray (01/12/2024, my reading of images):  Endotracheal tube is adequate in placement.  No active infiltrates or pleural effusions, mild prominence of pulmonary vascular markings.    Assessment:     Infrarenal AAA, post open repair 01/11/2024  Acute respiratory failure postoperative requiring ongoing mechanical ventilation.  Multiple episodes of transient hypotension postoperative, now more stable through the night.  Episodes appear to have responded to IV fluid boluses and transfusion with  PRBCs.  No current clinical evidence of active bleeding.  History of chronic seizure disorder  Schizophrenia    Plan:     Good oxygenation and ventilation on current mechanical ventilatory settings, patient now awake and alert  Begin spontaneous breathing trial, hopefully to extubate this a.m..       35 minutes of critical care was time spent personally by me on the following activities: development of treatment plan with patient or surrogate and bedside caregivers, discussions with consultants, evaluation of patient's response to treatment, examination of patient, ordering and performing treatments and interventions, ordering and review of laboratory studies, ordering and review of radiographic studies, pulse oximetry, re-evaluation of patient's condition.  This patient demonstrates a high probability for further clinical decompensation due to ongoing critical illness.  Critical care time did not overlap with that of any other provider or involve time for any procedures.       Elizabeth Wallace MD, [facility]  Pulmonary/Critical Care

## 2024-01-12 NOTE — PROGRESS NOTES
Vascular Surgery Progress Note  01/12/2024   Location:[facility]      Interval History:  The patient had a difficult time overnight with his blood pressure.  It was very labile.  After hydration, he was noted to have a decreased hemoglobin was given blood transfusion.  There was a concern about ongoing bleeding.  CTA was performed showing no evidence of active bleeding.  This morning the patient has been extubated.  He was not supposed to eat anything but he was given a lunch when she ate a little bit of.  Denies any nauseousness.  He has not passing gas.    ROS     Objective   Physical Exam  VITAL SIGNS: 24 HR MIN & MAX LAST    Temp  Min: 97.7 °F (36.5 °C)  Max: 99.4 °F (37.4 °C)  97.9 °F (36.6 °C)        BP  Min: 66/49  Max: 157/82  (!) 152/79     Pulse  Min: 63  Max: 118  (!) 112     Resp  Min: 10  Max: 33  13    SpO2  Min: 83 %  Max: 100 %  96 %      Recent Results (from the past 24 hour(s))   Hemoglobin and Hematocrit    Collection Time: 01/11/24  3:04 PM   Result Value Ref Range    Hgb 11.8 (L) 14.0 - 18.0 g/dL    Hct 35.5 (L) 42.0 - 52.0 %   Comprehensive Metabolic Panel    Collection Time: 01/11/24  3:04 PM   Result Value Ref Range    Sodium Level 137 136 - 145 mmol/L    Potassium Level 4.0 3.5 - 5.1 mmol/L    Chloride 107 98 - 107 mmol/L    Carbon Dioxide 21 (L) 23 - 31 mmol/L    Glucose Level 166 (H) 82 - 115 mg/dL    Blood Urea Nitrogen 12.6 8.4 - 25.7 mg/dL    Creatinine 1.11 0.73 - 1.18 mg/dL    Calcium Level Total 8.8 8.8 - 10.0 mg/dL    Protein Total 5.4 (L) 5.8 - 7.6 gm/dL    Albumin Level 3.2 (L) 3.4 - 4.8 g/dL    Globulin 2.2 (L) 2.4 - 3.5 gm/dL    Albumin/Globulin Ratio 1.5 1.1 - 2.0 ratio    Bilirubin Total 0.6 <=1.5 mg/dL    Alkaline Phosphatase 44 40 - 150 unit/L    Alanine Aminotransferase 9 0 - 55 unit/L    Aspartate Aminotransferase 20 5 - 34 unit/L    eGFR >60 mls/min/1.73/m2   Hemoglobin and Hematocrit    Collection Time: 01/11/24  3:04 PM   Result Value Ref  Range    Hgb 11.7 (L) 14.0 - 18.0 g/dL    Hct 36.3 (L) 42.0 - 52.0 %   Troponin I    Collection Time: 01/11/24  3:04 PM   Result Value Ref Range    Troponin-I 0.041 0.000 - 0.045 ng/mL   TSH    Collection Time: 01/11/24  3:04 PM   Result Value Ref Range    TSH 1.042 0.350 - 4.940 uIU/mL   Hemoglobin and Hematocrit    Collection Time: 01/11/24  8:10 PM   Result Value Ref Range    Hgb 10.3 (L) 14.0 - 18.0 g/dL    Hct 31.0 (L) 42.0 - 52.0 %   RT Blood Gas    Collection Time: 01/11/24  8:10 PM   Result Value Ref Range    Sample Type Arterial Blood     Sample site Arterial Line     Drawn by jean pierre rt     pH, Blood gas 7.420 7.350 - 7.450    pCO2, Blood gas 38.0 35.0 - 45.0 mmHg    pO2, Blood gas 132.0 (H) 80.0 - 100.0 mmHg    Sodium, Blood Gas 134 (L) 137 - 145 mmol/L    Potassium, Blood Gas 4.1 3.5 - 5.0 mmol/L    Calcium Level Ionized 1.16 1.12 - 1.23 mmol/L    TOC2, Blood gas 25.8 mmol/L    Base Excess, Blood gas 0.30 mmol/L    sO2, Blood gas 99.0 %    HCO3, Blood gas 24.6 22.0 - 26.0 mmol/L    Allens Test N/A     MODE SIMV     Oxygen Device, Blood gas Ventilator     FIO2, Blood gas 30 %    Mech Vt 500 ml    Mech RR 20 b/min    PEEP 5.0 cmH2O    PS 10.0 cmH2O    Itime (sec) 1 sec   Hemoglobin and Hematocrit    Collection Time: 01/12/24 12:06 AM   Result Value Ref Range    Hgb 9.7 (L) 14.0 - 18.0 g/dL    Hct 29.6 (L) 42.0 - 52.0 %   Magnesium    Collection Time: 01/12/24  2:06 AM   Result Value Ref Range    Magnesium Level 1.60 1.60 - 2.60 mg/dL   Comprehensive metabolic panel    Collection Time: 01/12/24  2:06 AM   Result Value Ref Range    Sodium Level 139 136 - 145 mmol/L    Potassium Level 4.4 3.5 - 5.1 mmol/L    Chloride 113 (H) 98 - 107 mmol/L    Carbon Dioxide 22 (L) 23 - 31 mmol/L    Glucose Level 130 (H) 82 - 115 mg/dL    Blood Urea Nitrogen 14.0 8.4 - 25.7 mg/dL    Creatinine 1.16 0.73 - 1.18 mg/dL    Calcium Level Total 7.5 (L) 8.8 - 10.0 mg/dL    Protein Total 4.3 (L) 5.8 - 7.6 gm/dL    Albumin Level 2.6 (L)  3.4 - 4.8 g/dL    Globulin 1.7 (L) 2.4 - 3.5 gm/dL    Albumin/Globulin Ratio 1.5 1.1 - 2.0 ratio    Bilirubin Total 0.3 <=1.5 mg/dL    Alkaline Phosphatase 39 (L) 40 - 150 unit/L    Alanine Aminotransferase 5 0 - 55 unit/L    Aspartate Aminotransferase 17 5 - 34 unit/L    eGFR >60 mls/min/1.73/m2   CBC with Differential    Collection Time: 01/12/24  2:06 AM   Result Value Ref Range    WBC 13.56 (H) 4.50 - 11.50 x10(3)/mcL    RBC 2.95 (L) 4.70 - 6.10 x10(6)/mcL    Hgb 8.9 (L) 14.0 - 18.0 g/dL    Hct 27.6 (L) 42.0 - 52.0 %    MCV 93.6 80.0 - 94.0 fL    MCH 30.2 27.0 - 31.0 pg    MCHC 32.2 (L) 33.0 - 36.0 g/dL    RDW 15.5 11.5 - 17.0 %    Platelet 106 (L) 130 - 400 x10(3)/mcL    MPV 11.4 (H) 7.4 - 10.4 fL    Neut % 78.4 %    Lymph % 15.2 %    Mono % 5.9 %    Eos % 0.0 %    Basophil % 0.2 %    Lymph # 2.06 0.6 - 4.6 x10(3)/mcL    Neut # 10.63 (H) 2.1 - 9.2 x10(3)/mcL    Mono # 0.80 0.1 - 1.3 x10(3)/mcL    Eos # 0.00 0 - 0.9 x10(3)/mcL    Baso # 0.03 <=0.2 x10(3)/mcL    IG# 0.04 0 - 0.04 x10(3)/mcL    IG% 0.3 %    NRBC% 0.0 %    IPF 4.1 0.9 - 11.2 %   Lactic Acid, Plasma    Collection Time: 01/12/24  2:06 AM   Result Value Ref Range    Lactic Acid Level 1.3 0.5 - 2.2 mmol/L   RT Blood Gas    Collection Time: 01/12/24  7:12 AM   Result Value Ref Range    Sample Type Arterial Blood     Sample site Arterial Line     Drawn by sd rrt     pH, Blood gas 7.350 7.350 - 7.450    pCO2, Blood gas 44.0 35.0 - 45.0 mmHg    pO2, Blood gas 104.0 (H) 80.0 - 100.0 mmHg    Sodium, Blood Gas 135 (L) 137 - 145 mmol/L    Potassium, Blood Gas 4.4 3.5 - 5.0 mmol/L    Calcium Level Ionized 1.13 1.12 - 1.23 mmol/L    TOC2, Blood gas 25.7 mmol/L    Base Excess, Blood gas -1.50 mmol/L    sO2, Blood gas 98.0 %    HCO3, Blood gas 24.3 22.0 - 26.0 mmol/L    Allens Test N/A     MODE SIMV     Oxygen Device, Blood gas Ventilator     FIO2, Blood gas 30 %    Mech Vt 500 ml    Mech RR 12 b/min    PEEP 5.0 cmH2O    PS 10.0 cmH2O   CBC with Differential     Collection Time: 01/12/24  1:50 PM   Result Value Ref Range    WBC 13.38 (H) 4.50 - 11.50 x10(3)/mcL    RBC 3.59 (L) 4.70 - 6.10 x10(6)/mcL    Hgb 10.8 (L) 14.0 - 18.0 g/dL    Hct 32.3 (L) 42.0 - 52.0 %    MCV 90.0 80.0 - 94.0 fL    MCH 30.1 27.0 - 31.0 pg    MCHC 33.4 33.0 - 36.0 g/dL    RDW 16.1 11.5 - 17.0 %    Platelet 97 (L) 130 - 400 x10(3)/mcL    MPV 10.5 (H) 7.4 - 10.4 fL    Neut % 78.4 %    Lymph % 14.9 %    Mono % 6.0 %    Eos % 0.1 %    Basophil % 0.2 %    Lymph # 1.99 0.6 - 4.6 x10(3)/mcL    Neut # 10.50 (H) 2.1 - 9.2 x10(3)/mcL    Mono # 0.80 0.1 - 1.3 x10(3)/mcL    Eos # 0.01 0 - 0.9 x10(3)/mcL    Baso # 0.03 <=0.2 x10(3)/mcL    IG# 0.05 (H) 0 - 0.04 x10(3)/mcL    IG% 0.4 %    NRBC% 0.0 %    IPF 3.3 0.9 - 11.2 %     X-Ray Chest 1 View   Final Result      Poor inspiratory effort accentuates the pulmonary vascular markings.      Slightly more confluent opacities in both bases left more than right although these might be related to the poor inspiratory effort infiltrate cannot be completely excluded.      Interval insertion of endotracheal tube and nasogastric tube         Electronically signed by: Andrea Jj   Date:    01/12/2024   Time:    08:46      CTA Abdomen and Pelvis   Final Result      1. Postsurgical changes of recent open abdominal aortic aneurysm aorto bi-iliac graft repair.  The graft is patent and normal caliber.  No appreciable arterial leak.   2. Expected postoperative gas at the excluded, opened aneurysm sac and within the abdomen.   3. Moderate free intraperitoneal fluid, possibly hemoperitoneum   4. Subtle patchy cortical hypoenhancement at the right kidney likely related to altered perfusion.  Changes are very subtle.  The right kidney does enhance in the right renal artery is patent.   5. Indeterminate rounded hyperdensity at the anterior lower abdomen, possibly small hematoma or clustered, collapsed small bowel loops   6. Atherosclerotic disease at the left external iliac  artery with moderate stenosis.  Stenosis is new from previous exam.  Question component of vaso spasm/vaso constriction, mural hematoma or dissection flap.  There is patent runoff to the groin.  Discrepancy from preliminary report.         Electronically signed by: Jackelyn Gant   Date:    01/12/2024   Time:    08:17      US Abdominal Aorta   Final Result      XR Gastric tube check, non-radiologist performed   Final Result            Intake/Output:  I/O this shift:  In: 893.8 [I.V.:893.8]  Out: 615 [Urine:615]  I/O last 3 completed shifts:  In: 8803.1 [I.V.:2022.1; Blood:1469.9; IV Piggyback:5311.1]  Out: 4325 [Urine:2625; Drains:100; Blood:1600]     Exam:    The patient has palpable dorsalis pedis pulses bilaterally.  His abdominal incision is clean and dry.  There is a little bit of serosanguineous drainage from the lower portion.  I think this is edema fluid from intra-abdominal that is draining through the fascial repair.       Assessment & Plan   There are no hospital problems to display for this patient.    The patient is status post open aortic aneurysm repair.  CTA was reviewed showing no evidence of active bleeding.  He has good flow to the lower legs with palpable pedal pulses.  He is good urine output with adequate hydration.  I think he has some intra-abdominal ascitic fluid that is draining through his fascial repair.  We will put a wound VAC in the lower portion of the abdomen to help with this fluid drainage.  In the meantime will plan to get him out of bed.  He should have an incentive spirometer.  Remain NPO until he starts passing gas.  I discussed the case with the critical care team and would like to keep in the ICU 1 more night.    Royce Gatica

## 2024-01-12 NOTE — CARE UPDATE
[facility] ICU  On Call Event Note       Event Note:     I was notified by bedside nursing staff at approximately 12:32 PM on 01/12/2024 that patient was having blood drainage from abdominal suture site. Upon my evaluation, abdominal incision had come apart somewhat and had small trickle of blood from superior aspect. Verbal consent was obtained by the patient. Under sterile technique, patient was cleaned with betadine, 5 cc 1% lidocaine with epinephrine administered around site, two stitches of nylon 2-0 placed with good hemostasis. Gauze bandage applied overtop.     Ellen Maier DO  [facility], -3  01/12/2024

## 2024-01-12 NOTE — PROGRESS NOTES
Nursing staff notified me that the patient was continuing to bleed with a wound VAC in place.  The patient is now tachycardic and hypertensive requiring Cleviprex.  Wound VAC is bloody with multiple cartilages with bright red blood produced so far.  Imaging performed this morning did not demonstrate any active bleeding but did demonstrate intra-abdominal fluid collection with consideration for possible hematoma.  The patient has been evaluated by Dr. Gatica with plans to continue wound VAC and need to ambulate etc..  Will repeat hemoglobin hematocrit at 6:00 p.m. and again at midnight tomorrow to ensure there is no significant blood loss given the above.  The patient's exam around 3:00 p.m. per Dr. Gatica demonstrated appropriate pulses in the lower extremities.  Continue to monitor in the ICU.

## 2024-01-12 NOTE — NURSING
Nurses Note -- 4 Eyes      1/11/2024   6:07 PM      Skin assessed during: Admit      [x] No Altered Skin Integrity Present    []Prevention Measures Documented      [] Yes- Altered Skin Integrity Present or Discovered   [] LDA Added if Not in Epic (Describe Wound)   [] New Altered Skin Integrity was Present on Admit and Documented in LDA   [] Wound Image Taken    Wound Care Consulted? No    Attending Nurse:  Ilana Mtz RN     Second RN/Staff Member:   Luda Lambert RN

## 2024-01-12 NOTE — NURSING
Nurses Note -- 4 Eyes      1/12/2024   10:19 AM      Skin assessed during: Daily Assessment      [x] No Altered Skin Integrity Present    []Prevention Measures Documented      [] Yes- Altered Skin Integrity Present or Discovered   [] LDA Added if Not in Epic (Describe Wound)   [] New Altered Skin Integrity was Present on Admit and Documented in LDA   [] Wound Image Taken    Wound Care Consulted? No    Attending Nurse:  Ilana Mtz RN     Second RN/Staff Member:   Lita Pearson RN

## 2024-01-13 NOTE — NURSING
Nurses Note -- 4 Eyes      1/13/2024   12:59 PM      Skin assessed during: Daily Assessment      [x] No Altered Skin Integrity Present    [x]Prevention Measures Documented      [] Yes- Altered Skin Integrity Present or Discovered   [] LDA Added if Not in Epic (Describe Wound)   [] New Altered Skin Integrity was Present on Admit and Documented in LDA   [] Wound Image Taken    Wound Care Consulted? No    Attending Nurse:  Alysha Blake RN     Second RN/Staff Member:  Toby Pearson RN

## 2024-01-13 NOTE — PLAN OF CARE
Problem: Adult Inpatient Plan of Care  Goal: Plan of Care Review  Outcome: Ongoing, Progressing  Goal: Patient-Specific Goal (Individualized)  Outcome: Ongoing, Progressing  Goal: Absence of Hospital-Acquired Illness or Injury  Outcome: Ongoing, Progressing  Goal: Optimal Comfort and Wellbeing  Outcome: Ongoing, Progressing  Goal: Readiness for Transition of Care  Outcome: Ongoing, Progressing     Problem: Infection  Goal: Absence of Infection Signs and Symptoms  Outcome: Ongoing, Progressing     Problem: Skin Injury Risk Increased  Goal: Skin Health and Integrity  Outcome: Ongoing, Progressing     Problem: Fall Injury Risk  Goal: Absence of Fall and Fall-Related Injury  Outcome: Ongoing, Progressing     Problem: Communication Impairment (Mechanical Ventilation, Invasive)  Goal: Effective Communication  Outcome: Ongoing, Progressing     Problem: Device-Related Complication Risk (Mechanical Ventilation, Invasive)  Goal: Optimal Device Function  Outcome: Ongoing, Progressing     Problem: Inability to Wean (Mechanical Ventilation, Invasive)  Goal: Mechanical Ventilation Liberation  Outcome: Ongoing, Progressing     Problem: Nutrition Impairment (Mechanical Ventilation, Invasive)  Goal: Optimal Nutrition Delivery  Outcome: Ongoing, Progressing     Problem: Skin and Tissue Injury (Mechanical Ventilation, Invasive)  Goal: Absence of Device-Related Skin and Tissue Injury  Outcome: Ongoing, Progressing     Problem: Ventilator-Induced Lung Injury (Mechanical Ventilation, Invasive)  Goal: Absence of Ventilator-Induced Lung Injury  Outcome: Ongoing, Progressing     Problem: Communication Impairment (Artificial Airway)  Goal: Effective Communication  Outcome: Ongoing, Progressing     Problem: Device-Related Complication Risk (Artificial Airway)  Goal: Optimal Device Function  Outcome: Ongoing, Progressing     Problem: Skin and Tissue Injury (Artificial Airway)  Goal: Absence of Device-Related Skin or Tissue Injury  Outcome:  Ongoing, Progressing

## 2024-01-13 NOTE — PROGRESS NOTES
Vascular Surgery Progress Note  01/13/2024   Location:[facility]      Interval History:  No problems overnight    ROS     Objective   Physical Exam  VITAL SIGNS: 24 HR MIN & MAX LAST    Temp  Min: 97.9 °F (36.6 °C)  Max: 100.1 °F (37.8 °C)  99.2 °F (37.3 °C)        BP  Min: 94/55  Max: 177/97  125/71     Pulse  Min: 90  Max: 138  100     Resp  Min: 7  Max: 41  (!) 24    SpO2  Min: 89 %  Max: 100 %  100 %      Recent Results (from the past 24 hour(s))   CBC with Differential    Collection Time: 01/12/24  1:50 PM   Result Value Ref Range    WBC 13.38 (H) 4.50 - 11.50 x10(3)/mcL    RBC 3.59 (L) 4.70 - 6.10 x10(6)/mcL    Hgb 10.8 (L) 14.0 - 18.0 g/dL    Hct 32.3 (L) 42.0 - 52.0 %    MCV 90.0 80.0 - 94.0 fL    MCH 30.1 27.0 - 31.0 pg    MCHC 33.4 33.0 - 36.0 g/dL    RDW 16.1 11.5 - 17.0 %    Platelet 97 (L) 130 - 400 x10(3)/mcL    MPV 10.5 (H) 7.4 - 10.4 fL    Neut % 78.4 %    Lymph % 14.9 %    Mono % 6.0 %    Eos % 0.1 %    Basophil % 0.2 %    Lymph # 1.99 0.6 - 4.6 x10(3)/mcL    Neut # 10.50 (H) 2.1 - 9.2 x10(3)/mcL    Mono # 0.80 0.1 - 1.3 x10(3)/mcL    Eos # 0.01 0 - 0.9 x10(3)/mcL    Baso # 0.03 <=0.2 x10(3)/mcL    IG# 0.05 (H) 0 - 0.04 x10(3)/mcL    IG% 0.4 %    NRBC% 0.0 %    IPF 3.3 0.9 - 11.2 %   Hemoglobin and Hematocrit    Collection Time: 01/12/24  7:27 PM   Result Value Ref Range    Hgb 10.2 (L) 14.0 - 18.0 g/dL    Hct 29.8 (L) 42.0 - 52.0 %   Hemoglobin and Hematocrit    Collection Time: 01/13/24 12:34 AM   Result Value Ref Range    Hgb 10.2 (L) 14.0 - 18.0 g/dL    Hct 29.8 (L) 42.0 - 52.0 %   Phosphorus    Collection Time: 01/13/24 12:34 AM   Result Value Ref Range    Phosphorus Level 2.1 (L) 2.3 - 4.7 mg/dL   Magnesium    Collection Time: 01/13/24 12:34 AM   Result Value Ref Range    Magnesium Level 1.40 (L) 1.60 - 2.60 mg/dL   Basic Metabolic Panel    Collection Time: 01/13/24 12:34 AM   Result Value Ref Range    Sodium Level 135 (L) 136 - 145 mmol/L    Potassium Level 3.5  3.5 - 5.1 mmol/L    Chloride 106 98 - 107 mmol/L    Carbon Dioxide 23 23 - 31 mmol/L    Glucose Level 105 82 - 115 mg/dL    Blood Urea Nitrogen 11.1 8.4 - 25.7 mg/dL    Creatinine 0.90 0.73 - 1.18 mg/dL    BUN/Creatinine Ratio 12     Calcium Level Total 7.7 (L) 8.8 - 10.0 mg/dL    Anion Gap 6.0 mEq/L    eGFR >60 mls/min/1.73/m2   CBC with Differential    Collection Time: 01/13/24  5:04 AM   Result Value Ref Range    WBC 13.33 (H) 4.50 - 11.50 x10(3)/mcL    RBC 3.16 (L) 4.70 - 6.10 x10(6)/mcL    Hgb 9.7 (L) 14.0 - 18.0 g/dL    Hct 28.4 (L) 42.0 - 52.0 %    MCV 89.9 80.0 - 94.0 fL    MCH 30.7 27.0 - 31.0 pg    MCHC 34.2 33.0 - 36.0 g/dL    RDW 15.7 11.5 - 17.0 %    Platelet 104 (L) 130 - 400 x10(3)/mcL    MPV 10.8 (H) 7.4 - 10.4 fL    Neut % 77.7 %    Lymph % 13.7 %    Mono % 7.7 %    Eos % 0.1 %    Basophil % 0.2 %    Lymph # 1.83 0.6 - 4.6 x10(3)/mcL    Neut # 10.36 (H) 2.1 - 9.2 x10(3)/mcL    Mono # 1.02 0.1 - 1.3 x10(3)/mcL    Eos # 0.01 0 - 0.9 x10(3)/mcL    Baso # 0.03 <=0.2 x10(3)/mcL    IG# 0.08 (H) 0 - 0.04 x10(3)/mcL    IG% 0.6 %    NRBC% 0.0 %    IPF 3.2 0.9 - 11.2 %     X-Ray Chest 1 View   Final Result      Poor inspiratory effort accentuates the pulmonary vascular markings.      Slightly more confluent opacities in both bases left more than right although these might be related to the poor inspiratory effort infiltrate cannot be completely excluded.      Interval insertion of endotracheal tube and nasogastric tube         Electronically signed by: Andrea Jj   Date:    01/12/2024   Time:    08:46      CTA Abdomen and Pelvis   Final Result      1. Postsurgical changes of recent open abdominal aortic aneurysm aorto bi-iliac graft repair.  The graft is patent and normal caliber.  No appreciable arterial leak.   2. Expected postoperative gas at the excluded, opened aneurysm sac and within the abdomen.   3. Moderate free intraperitoneal fluid, possibly hemoperitoneum   4. Subtle patchy cortical  hypoenhancement at the right kidney likely related to altered perfusion.  Changes are very subtle.  The right kidney does enhance in the right renal artery is patent.   5. Indeterminate rounded hyperdensity at the anterior lower abdomen, possibly small hematoma or clustered, collapsed small bowel loops   6. Atherosclerotic disease at the left external iliac artery with moderate stenosis.  Stenosis is new from previous exam.  Question component of vaso spasm/vaso constriction, mural hematoma or dissection flap.  There is patent runoff to the groin.  Discrepancy from preliminary report.         Electronically signed by: Jackelyn Gant   Date:    01/12/2024   Time:    08:17      US Abdominal Aorta   Final Result      XR Gastric tube check, non-radiologist performed   Final Result            Intake/Output:  I/O this shift:  In: 197.3 [I.V.:197.3]  Out: 250 [Urine:250]  I/O last 3 completed shifts:  In: 5382.2 [I.V.:3060.5; Blood:522.9; IV Piggyback:1798.8]  Out: 4585 [Urine:4105; Drains:100; Other:380]     Exam:    Abdomen is soft.  There is some serosanguineous drainage from the lower portion of the incision.  The wound VAC was working but they ran out of canisters.  And will be switched to ABDs.  The patient has 2+ DP pulses.  He is awake but somewhat blunted affect because he just took his psychiatric medicines.  Lungs are clear.       Assessment & Plan   There are no hospital problems to display for this patient.    Overall the patient is doing well status post open aortic aneurysm repair.  I think the patient was fluid overloaded and that is why he has so much serosanguineous drainage from his incision.  We gave him 40 of Lasix yesterday and he responded with over a L in 6 hours.  He looks less edematous today.  We will hold off on additional Lasix for now but will consider it.  He says that he is passing gas but I do not trust his mental status and affect.  We may be able to slowly start him to a soft diet if we  feel confident that his bowel function is returning.  We will plan to get him out of bed into a chair.  We will get the a line out and control his blood pressure with hydralazine.  We will decrease his IV fluids to 50 cc an hour.    Royce Gatica

## 2024-01-13 NOTE — NURSING
Nurses Note -- 4 Eyes      1/12/2024   9:52 PM      Skin assessed during: Daily Assessment      [x] No Altered Skin Integrity Present    [x]Prevention Measures Documented      [] Yes- Altered Skin Integrity Present or Discovered   [] LDA Added if Not in Epic (Describe Wound)   [] New Altered Skin Integrity was Present on Admit and Documented in LDA   [] Wound Image Taken    Wound Care Consulted? No    Attending Nurse:  Aleshia Marcos RN/Staff Member:  DEVI Mathews

## 2024-01-13 NOTE — H&P
[facility] Hospital Medicine History & Physical Examination       Patient Name: Red Aaron Jr.  MRN: [id number]  Patient Class: IP- Inpatient   Admission Date: 1/11/2024   Admitting Physician: Loco Valentin MD  Length of Stay: 2  Attending Physician: Venkat Easton MD   Primary Care Provider: Rip Ansari MD  Face-to-Face encounter date: 01/13/2024  Code Status:Full code       Patient information was obtained from patient, patient's family, past medical records and ER records.     HISTORY OF PRESENT ILLNESS:   Red Aaron Jr. is a 68 y.o. male with a past medical history of essential hypertension, HLD, AAA, seizure disorder, schizophrenia, and PTSD presented to [facility] on 1/11/2024 for open repair of infrarenal AAA by Dr. Gatica.  Patient received 2 units packed red blood cells transfusion intraoperatively.  Patient remained intubated postoperatively and transferred to ICU for close monitoring.  Patient was started on Levophed for labile blood pressures.  On 01/12 patient began having blood drainage from abdominal suture site.  Two additional stitches were placed by ICU.  CTA revealed no active bleeding.  Wound VAC was placed to lower portion of abdomen to help with fluid drainage. Patient was also given IV Lasix 40 mg with good response.  Patient became tachycardic and hypertensive requiring Cleviprex. Patient was extubated on 01/12/2024.  Cleviprex was discontinued on 01/12/2023.  Patient was cleared for downgrade out of ICU on 01/13/2024.  Hospital medicine was consulted for transition of care and further medical management.    PAST MEDICAL HISTORY:   Essential hypertension  Hyperlipidemia  AAA  Seizure disorder  Schizophrenia  PTSD    PAST SURGICAL HISTORY:     Past Surgical History:   Procedure Laterality Date    COLONOSCOPY      CREATION OF BILATERAL AORTOFEMORAL ARTERIAL BYPASS N/A 1/11/2024    Procedure: CREATION, BYPASS, ARTERIAL, AORTA TO FEMORAL, BILATERAL;   Surgeon: Royce Gatica MD;  [facility];  Service: Peripheral Vascular;  Laterality: N/A;    TONSILLECTOMY         ALLERGIES:   Patient has no known allergies.    FAMILY HISTORY:   Reviewed and negative    SOCIAL HISTORY:   Denies tobacco, drug, and alcohol use    HOME MEDICATIONS:     Prior to Admission medications    Medication Sig Start Date End Date Taking? Authorizing Provider   aspirin 81 MG Chew Take 81 mg by mouth once daily. 10/29/21  Yes Provider, Historical   benztropine (COGENTIN) 1 MG tablet Take 0.5 mg by mouth 2 (two) times a day.   Yes Provider, Historical   gabapentin (NEURONTIN) 300 MG capsule Take 1 capsule (300 mg total) by mouth nightly. 10/17/23 4/14/24 Yes Rip Ansari MD   haloperidol decanoate (HALDOL DECANOATE) 100 mg/mL injection Inject 1.5 mLs into the muscle every 21 days.   Yes Provider, Historical   lisinopriL (PRINIVIL,ZESTRIL) 5 MG Tab Take 1 tablet (5 mg total) by mouth once daily. 10/17/23 4/14/24 Yes Rip Ansari MD   loratadine (CLARITIN) 10 mg tablet Take 10 mg by mouth once daily. 10/29/21  Yes Provider, Historical   metoprolol succinate (TOPROL-XL) 50 MG 24 hr tablet Take 1 tablet (50 mg total) by mouth once daily. 10/17/23 4/14/24 Yes Rip Ansari MD   multivitamin (ONE DAILY MULTIVITAMIN) per tablet Take 1 tablet by mouth once daily.   Yes Provider, Historical   OLANZapine (ZYPREXA) 20 MG tablet Take 0.5 tablets by mouth 2 (two) times a day.   Yes Provider, Historical   rosuvastatin (CRESTOR) 20 MG tablet Take 1 tablet (20 mg total) by mouth once daily. 10/17/23 4/14/24 Yes Rip Ansari MD       REVIEW OF SYSTEMS:   Except as documented, all other systems reviewed and negative     PHYSICAL EXAM:     VITAL SIGNS: 24 HRS MIN & MAX LAST   Temp  Min: 97.9 °F (36.6 °C)  Max: 100.1 °F (37.8 °C) 98.5 °F (36.9 °C)   BP  Min: 94/55  Max: 177/97 139/76   Pulse  Min: 100  Max: 138  (!) 125   Resp  Min: 7  Max: 41 20   SpO2  Min: 89 %  Max: 100 % 97 %        General appearance: Male in no apparent distress.  HEENNT: Atraumatic head.   Lungs: Clear to auscultation bilaterally. Supplemental oxygen via nasal cannula in place   Heart: Tachycardia   Abdomen: Soft, non-distended, non-tender. Bowel sounds are normal.   Extremities:  No deformities.   Skin: Dressing noted to abdomen with small amount of brown saturation   Neuro: Awake, alert, and oriented. Follows commands   Psych: Flat affect     LABS AND IMAGING:     Recent Labs   Lab 01/12/24  0206 01/12/24  1350 01/12/24  1927 01/13/24  0034 01/13/24  0504   WBC 13.56* 13.38*  --   --  13.33*   RBC 2.95* 3.59*  --   --  3.16*   HGB 8.9* 10.8* 10.2* 10.2* 9.7*   HCT 27.6* 32.3* 29.8* 29.8* 28.4*   MCV 93.6 90.0  --   --  89.9   MCH 30.2 30.1  --   --  30.7   MCHC 32.2* 33.4  --   --  34.2   RDW 15.5 16.1  --   --  15.7   * 97*  --   --  104*   MPV 11.4* 10.5*  --   --  10.8*       Recent Labs   Lab 01/11/24  1345 01/11/24  1415 01/11/24  1504 01/11/24 2010 01/12/24  0206 01/12/24  0712 01/13/24  0034     --  137  --  139  --  135*   K 2.7*  --  4.0  --  4.4  --  3.5   CO2 17*  --  21*  --  22*  --  23   BUN 9.3  --  12.6  --  14.0  --  11.1   CREATININE 0.68*  --  1.11  --  1.16  --  0.90   CALCIUM 6.2*  --  8.8  --  7.5*  --  7.7*   PH  --  7.320*  --  7.420  --  7.350  --    MG  --   --   --   --  1.60  --  1.40*   ALBUMIN 2.3*  --  3.2*  --  2.6*  --   --    ALKPHOS 34*  --  44  --  39*  --   --    ALT 7  --  9  --  5  --   --    AST 16  --  20  --  17  --   --    BILITOT 0.5  --  0.6  --  0.3  --   --        Microbiology Results (last 7 days)       ** No results found for the last 168 hours. **             Cardiac catheterization    The estimated blood loss was none.    The procedure log was documented by No documenter listed and verified by   Royce Gatica MD.    Date: 1/12/2024  Time: 9:48 PM  X-Ray Chest 1 View  Narrative: EXAMINATION:  XR CHEST 1 VIEW    CPT 18296    CLINICAL  HISTORY:  respiratory failure;    COMPARISON:  January 10, 2024    FINDINGS:  Examination reveals a poor inspiratory effort that accentuates the pulmonary vascular markings.    Mediastinal silhouette is within normal limits cardiac silhouette is not enlarged lung fields reveal some increase interstitial markings slightly more confluent in both bases although these might be related to a poor inspiratory effort and early infiltrate cannot be completely excluded.    Interval insertion of an endotracheal tube with the tip above the asiya nasogastric tube is seen with the tip below the diaphragm  Impression: Poor inspiratory effort accentuates the pulmonary vascular markings.    Slightly more confluent opacities in both bases left more than right although these might be related to the poor inspiratory effort infiltrate cannot be completely excluded.    Interval insertion of endotracheal tube and nasogastric tube    Electronically signed by: Andrea Jj  Date:    01/12/2024  Time:    08:46  CTA Abdomen and Pelvis  Narrative: EXAMINATION:  CTA ABDOMEN AND PELVIS    CLINICAL HISTORY:  Aortic aneurysm (AAA), post-op;    TECHNIQUE:  Helically acquired images were obtained from the lung bases to the pubic symphysis prior to and after IV administration of contrast. Axial, sagittal, coronal and MIP reformations were interpreted.    Automated tube current modulation, weight-based exposure dosing, and/or iterative reconstruction technique utilized to reach lowest reasonably achievable exposure rate.    DLP: 1331 mGy*cm    COMPARISON:  CTA 11/22/2023    FINDINGS:  VASCULATURE:    Aorta: There are postsurgical changes of recent open abdominal aortic aneurysm graft repair with aorto bi-iliac graft.  The graft is patent and normal in caliber.  No appreciable arterial leak.  No delayed phase imaging obtained.  The excluded aneurysm sac demonstrates expected postoperative disruption and fluid and air within the aneurysm sac.  No  arterial enhancement seen within the aneurysm sac.    Mesenteric arteries: Celiac and superior mesenteric arteries are patent.  CHEYANNE not seen.    Renal arteries:No significant stenosis.    Iliac arteries: Patent iliac grafts.  Patent runoff to the groin bilaterally.  There is atherosclerotic disease at the left external iliac artery with moderate stenosis.  This is new from previous exam.  Question component of vaso spasm, mural hematoma or dissection flap.  Possible short segment non propagating flap at the proximal left internal iliac artery.    HEART: There are coronary artery calcifications.    LUNG BASES: Emphysema.  Trace basilar atelectasis.    LIVER: Normal arterial phase enhancement of the liver.    BILIARY: No calcified gallstones.    PANCREAS: No inflammatory change.    SPLEEN: Normal in size    ADRENALS: No mass.    KIDNEYS/URETERS: Subtle patchy cortical hypoenhancement at the right kidney.  No hydronephrosis.    GI TRACT/MESENTERY: Enteric tube terminates in the stomach.  Bowel is normal in caliber without evidence of obstruction.  Moderate colonic stool burden.     Hyperdensity in the lower abdomen/upper pelvis anteriorly measures 4.3 cm (14, 156).  This could be a small hematoma or clustered, collapsed bowel loops.    PERITONEUM AND RETROPERITONEUM: Moderate free intraperitoneal fluid, borderline hyperdense.Expected postoperative free intraperitoneal air, small to moderate.    LYMPH NODES: No enlarged lymph nodes by size criteria.    BLADDER: Collapsed about a Flores catheter.    REPRODUCTIVE ORGANS: Prostate indents upon the base of the urinary bladder.    SOFT TISSUES: Body wall edema.  Expected postoperative gas along the ventral incision.    BONES: Chronic partial fusion at L3-L4.  Impression: 1. Postsurgical changes of recent open abdominal aortic aneurysm aorto bi-iliac graft repair.  The graft is patent and normal caliber.  No appreciable arterial leak.  2. Expected postoperative gas at the  excluded, opened aneurysm sac and within the abdomen.  3. Moderate free intraperitoneal fluid, possibly hemoperitoneum  4. Subtle patchy cortical hypoenhancement at the right kidney likely related to altered perfusion.  Changes are very subtle.  The right kidney does enhance in the right renal artery is patent.  5. Indeterminate rounded hyperdensity at the anterior lower abdomen, possibly small hematoma or clustered, collapsed small bowel loops  6. Atherosclerotic disease at the left external iliac artery with moderate stenosis.  Stenosis is new from previous exam.  Question component of vaso spasm/vaso constriction, mural hematoma or dissection flap.  There is patent runoff to the groin.  Discrepancy from preliminary report.    Electronically signed by: Jackelyn Gant  Date:    01/12/2024  Time:    08:17        ASSESSMENT & PLAN:   Assessment:  Infrarenal AAA, post open repair 01/11/2024  Acute respiratory failure postoperative requiring intubation, extubated 01/12/2024  Normocytic anemia  Leukocytosis   Tachycardia   History of essential hypertension, HLD, AAA, seizure disorder, schizophrenia, and PTSD      Plan:  Vascular surgery following, appreciate recommendations   Cardiac monitoring   Continue supplemental oxygen, wean as tolerated   Close H/H monitoring  Monitor for bleeding   Vascular surgery following, appreciate recommendations   Continue appropriate home medications once med rec updated   Labs in a.m.  Further recommendations per attending MD     VTE Prophylaxis: SCDs    __________________________________________________________________________  INPATIENT LIST OF MEDICATIONS     Scheduled Meds:   benztropine  0.5 mg Oral BID    gabapentin  300 mg Oral Nightly    mupirocin   Nasal BID    OLANZapine  10 mg Oral BID    potassium bicarbonate  25 mEq Oral Once     Continuous Infusions:   sodium chloride 0.9% 50 mL/hr at 01/13/24 1400    clevidipine Stopped (01/12/24 2341)    NORepinephrine bitartrate-D5W  Stopped (24 0804)    propofoL Stopped (24 1405)     PRN Meds:.0.9%  NaCl infusion (for blood administration), [] fentaNYL **FOLLOWED BY** fentaNYL, hydrALAZINE, morphine, ondansetron      Jase STODDARD PA-C, have reviewed and discussed the case with Dr. Venkat Easton MD   Please see the following addendum for further assessment and plan from there attending MD.    2024    ________________________________________________________________________________    MD Addendum:  I,Venkat Easton MD   assumed care of this patient today   For the patient encounter, I performed the substantive portion of the visit, I reviewed the PA documentation, treatment plan, and medical decision making.  I had face to face time with this patient     68-year-old male with history of schizophrenia, hypertension, infrarenal AAA  with no abdominal pain but with concerns of expansion on repeat CT scans admitted to [facility] for elective AAA repair.  Patient underwent open AAA repair for infrarenal abdominal aortic aneurysm without rupture on 24 with Dr. Gatica.  Postoperatively patient was intubated and admitted to ICU intubated for respiratory failure.  Also patient had labile blood pressure needing Levophed as well as Cleviprex.  While in ICU, there was concern for bleeding from abdominal incision, CTA abdomen pelvis reported  no appreciable arterial leak graft is patent and normal caliber.  Vascular team  Suspected more of serosanguineous drainage from fluid overload from the incision rather than actual bleeding. Patient also received IV Lasix, decreased IV fluid rate to help with volume status.patient continues to be tachycardic during the course.   Patient received 2 units of PRBC intraop, latest hemoglobin 9.7 .    Patient was evaluated by ICU team, vascular teams, deemed stable to be managed further on floor, cleared the patient to downgrade to floors on 2024 around 3 30 p.m.    Seen and  examined at bedside.  No family member at bedside.  Patient is alert flat affect minimally vocal able to tell his name, could not obtain much history from the patient , of note patient with schizophrenia  Abdomen soft mildly distended midline incision covered, with a dressing FRANK Flores patient moving all extremities spontaneously       A/p:  Elective infrarenal AAA repair  Hypoxic respiratory failure status post extubation now on nasal cannula  Persistent sinus tachycardia   Labile blood pressure   History of schizophrenia    Continue to monitor on tele   Follow up BP parameters per vascular team, escalate care if bp fluctuates to initate any drips     Follow up postop care for wound  Patient noted to be to be tachycardic in 110- 120s, sinus rhythm,? Hydralazine related reflects tachy home medications listed in EMR reviewed, beta-blockers noted,patient is off of his home med beta-blockers,Vascular team aware of tachycardic per reports  Pain control  Monitor hemoglobin   Corrected electrolytes per chart review  Recommend to confirm home meds in EMR ,Continue home antipsychotics  Care discussed at length with patient's nurse in ICU she reported BP parameters as below 160     Code status full      01/13/2024

## 2024-01-14 NOTE — NURSING
Nurses Note -- 4 Eyes      1/14/2024   6:09 AM      Skin assessed during: Transfer      [x] No Altered Skin Integrity Present    [x]Prevention Measures Documented      [] Yes- Altered Skin Integrity Present or Discovered   [] LDA Added if Not in Epic (Describe Wound)   [] New Altered Skin Integrity was Present on Admit and Documented in LDA   [] Wound Image Taken    Wound Care Consulted? No    Attending Nurse:  Maura Mckee RN    Second RN/Staff Member:  Bonnie Dias RN

## 2024-01-14 NOTE — PROGRESS NOTES
[facility] Hospital Medicine Progress Note        Chief Complaint: Inpatient Follow-up     HPI:    68 y.o. male with a past medical history of essential hypertension, HLD, AAA, seizure disorder, schizophrenia, and PTSD presented to [facility] on 1/11/2024 for open repair of infrarenal AAA by Dr. Gatica.  Patient received 2 units packed red blood cells transfusion intraoperatively.  Patient remained intubated postoperatively and transferred to ICU for close monitoring.  Patient was started on Levophed for labile blood pressures.  On 01/12 patient began having blood drainage from abdominal suture site.  Two additional stitches were placed by ICU.  CTA revealed no active bleeding.  Wound VAC was placed to lower portion of abdomen to help with fluid drainage. Patient was also given IV Lasix 40 mg with good response.  Patient became tachycardic and hypertensive requiring Cleviprex. Patient was extubated on 01/12/2024.  Cleviprex was discontinued on 01/12/2023.  Patient was cleared for downgrade out of ICU on 01/13/2024.  Hospital medicine was consulted for transition of care and further medical management.     Interval Hx:   No acute events reported overnight.  RN reported concerns of choking with medications this morning   Seen and examined bedside.  No family member bedside.    Patient was resting comfortably, arousable but with some stimulus, reported some minimal abdominal pain. Case was discussed with patient's nurse   Objective/physical exam:  General: In no acute distress, afebrile  Chest:  Unlabored  Heart:  Tachycardic  Abdomen: Soft, mildly distended, abdominal incision covered with dressing pad pad  serosanguineous drainage on dressing  MSK: Warm, no lower extremity edema  Neurologic:  Somnolent, moving all extremities spontaneously  VITAL SIGNS: 24 HRS MIN & MAX LAST   Temp  Min: 98.5 °F (36.9 °C)  Max: 99.7 °F (37.6 °C) 99.7 °F (37.6 °C)   BP  Min: 113/71  Max: 166/88 130/77    Pulse  Min: 110  Max: 125  (!) 117   Resp  Min: 19  Max: 35 (!) 22   SpO2  Min: 91 %  Max: 100 % (!) 94 %     I have reviewed the following labs:  Recent Labs   Lab 24  1350 24  1927 24  0034 24  0504 24  0117   WBC 13.38*  --   --  13.33* 14.31*   RBC 3.59*  --   --  3.16* 3.17*   HGB 10.8*   < > 10.2* 9.7* 9.6*   HCT 32.3*   < > 29.8* 28.4* 29.1*   MCV 90.0  --   --  89.9 91.8   MCH 30.1  --   --  30.7 30.3   MCHC 33.4  --   --  34.2 33.0   RDW 16.1  --   --  15.7 15.2   PLT 97*  --   --  104* 111*   MPV 10.5*  --   --  10.8* 10.5*    < > = values in this interval not displayed.     Recent Labs   Lab 24  1415 24  1504 24  0206 24  0712 24  0034 24  0117   NA  --  137  --  139  --  135* 136   K  --  4.0  --  4.4  --  3.5 3.7   CO2  --  21*  --  22*  --  23 22*   BUN  --  12.6  --  14.0  --  11.1 11.0   CREATININE  --  1.11  --  1.16  --  0.90 0.84   CALCIUM  --  8.8  --  7.5*  --  7.7* 8.0*   PH 7.320*  --  7.420  --  7.350  --   --    MG  --   --   --  1.60  --  1.40* 1.90   ALBUMIN  --  3.2*  --  2.6*  --   --  2.2*   ALKPHOS  --  44  --  39*  --   --  51   ALT  --  9  --  5  --   --  22   AST  --  20  --  17  --   --  50*   BILITOT  --  0.6  --  0.3  --   --  0.8     Microbiology Results (last 7 days)       ** No results found for the last 168 hours. **             See below for Radiology    Scheduled Med:   benztropine  0.5 mg Oral BID    gabapentin  300 mg Oral Nightly    INV lisinopril  5 mg Oral Daily    metoprolol succinate  50 mg Oral Daily    mupirocin   Nasal BID    OLANZapine  10 mg Oral BID    potassium bicarbonate  25 mEq Oral Once      Continuous Infusions:   sodium chloride 0.9% 50 mL/hr at 24 1800      PRN Meds:  0.9%  NaCl infusion (for blood administration), [] fentaNYL **FOLLOWED BY** fentaNYL, hydrALAZINE, morphine, ondansetron     Assessment/Plan:  S/p infrarenal AAA repair  Hypoxic respiratory  failure status post extubation now on nasal cannula  Persistent sinus tachycardia   Hypersomnolence  History of schizophrenia    Postop day 3 status post open AAA repair   Labs from  this morning, patient with stable hemoglobin around 9.6, white count   14.31 K,Patient heart rate in 110s  Vascular saturated team following, Follow up postop surgical recommendations , wound care  Patient on olanzapine 10 b.i.d. which is a second-generation antipsychotic with sedative effect.  Upon review of his home medications patient on olanzapine 10 b.i.d. at home as well  We will check blood gas to see any CO2 retention   check head CT as well for any acute changes can contribute to hypersomnolence  Get psychiatric team on board to adjust medications given history of schizophrenia to decrease any sedative effects   Recommend to keep NPO until more alert  Obtain speech evaluation  Supportive care with IV hydration  Case discussed with patient's nurse    VTE prophylaxis:  SCDs    Patient condition:  Guarded    Anticipated discharge and Disposition:   To be decided    Addendum:   RN later reported patient is awake alert sitting in chair talking, we will hold on ABG, CT given patient back baseline but we will recommend to get psychiatric team involved to adjust his antipsychotics to avoid any future episodes of hypersomnolence    _____________________________________________________________________    Nutrition Status:    Radiology:  I have personally reviewed the following imaging and agree with the radiologist.     Cardiac catheterization    The estimated blood loss was none.    The procedure log was documented by No documenter listed and verified by   Royce Gatica MD.    Date: 1/12/2024  Time: 9:48 PM  X-Ray Chest 1 View  Narrative: EXAMINATION:  XR CHEST 1 VIEW    CPT 90905    CLINICAL HISTORY:  respiratory failure;    COMPARISON:  January 10, 2024    FINDINGS:  Examination reveals a poor inspiratory effort that  accentuates the pulmonary vascular markings.    Mediastinal silhouette is within normal limits cardiac silhouette is not enlarged lung fields reveal some increase interstitial markings slightly more confluent in both bases although these might be related to a poor inspiratory effort and early infiltrate cannot be completely excluded.    Interval insertion of an endotracheal tube with the tip above the asiay nasogastric tube is seen with the tip below the diaphragm  Impression: Poor inspiratory effort accentuates the pulmonary vascular markings.    Slightly more confluent opacities in both bases left more than right although these might be related to the poor inspiratory effort infiltrate cannot be completely excluded.    Interval insertion of endotracheal tube and nasogastric tube    Electronically signed by: Andrea Jj  Date:    01/12/2024  Time:    08:46  CTA Abdomen and Pelvis  Narrative: EXAMINATION:  CTA ABDOMEN AND PELVIS    CLINICAL HISTORY:  Aortic aneurysm (AAA), post-op;    TECHNIQUE:  Helically acquired images were obtained from the lung bases to the pubic symphysis prior to and after IV administration of contrast. Axial, sagittal, coronal and MIP reformations were interpreted.    Automated tube current modulation, weight-based exposure dosing, and/or iterative reconstruction technique utilized to reach lowest reasonably achievable exposure rate.    DLP: 1331 mGy*cm    COMPARISON:  CTA 11/22/2023    FINDINGS:  VASCULATURE:    Aorta: There are postsurgical changes of recent open abdominal aortic aneurysm graft repair with aorto bi-iliac graft.  The graft is patent and normal in caliber.  No appreciable arterial leak.  No delayed phase imaging obtained.  The excluded aneurysm sac demonstrates expected postoperative disruption and fluid and air within the aneurysm sac.  No arterial enhancement seen within the aneurysm sac.    Mesenteric arteries: Celiac and superior mesenteric arteries are patent.   CHEYANNE not seen.    Renal arteries:No significant stenosis.    Iliac arteries: Patent iliac grafts.  Patent runoff to the groin bilaterally.  There is atherosclerotic disease at the left external iliac artery with moderate stenosis.  This is new from previous exam.  Question component of vaso spasm, mural hematoma or dissection flap.  Possible short segment non propagating flap at the proximal left internal iliac artery.    HEART: There are coronary artery calcifications.    LUNG BASES: Emphysema.  Trace basilar atelectasis.    LIVER: Normal arterial phase enhancement of the liver.    BILIARY: No calcified gallstones.    PANCREAS: No inflammatory change.    SPLEEN: Normal in size    ADRENALS: No mass.    KIDNEYS/URETERS: Subtle patchy cortical hypoenhancement at the right kidney.  No hydronephrosis.    GI TRACT/MESENTERY: Enteric tube terminates in the stomach.  Bowel is normal in caliber without evidence of obstruction.  Moderate colonic stool burden.     Hyperdensity in the lower abdomen/upper pelvis anteriorly measures 4.3 cm (14, 156).  This could be a small hematoma or clustered, collapsed bowel loops.    PERITONEUM AND RETROPERITONEUM: Moderate free intraperitoneal fluid, borderline hyperdense.Expected postoperative free intraperitoneal air, small to moderate.    LYMPH NODES: No enlarged lymph nodes by size criteria.    BLADDER: Collapsed about a Flores catheter.    REPRODUCTIVE ORGANS: Prostate indents upon the base of the urinary bladder.    SOFT TISSUES: Body wall edema.  Expected postoperative gas along the ventral incision.    BONES: Chronic partial fusion at L3-L4.  Impression: 1. Postsurgical changes of recent open abdominal aortic aneurysm aorto bi-iliac graft repair.  The graft is patent and normal caliber.  No appreciable arterial leak.  2. Expected postoperative gas at the excluded, opened aneurysm sac and within the abdomen.  3. Moderate free intraperitoneal fluid, possibly hemoperitoneum  4. Subtle  patchy cortical hypoenhancement at the right kidney likely related to altered perfusion.  Changes are very subtle.  The right kidney does enhance in the right renal artery is patent.  5. Indeterminate rounded hyperdensity at the anterior lower abdomen, possibly small hematoma or clustered, collapsed small bowel loops  6. Atherosclerotic disease at the left external iliac artery with moderate stenosis.  Stenosis is new from previous exam.  Question component of vaso spasm/vaso constriction, mural hematoma or dissection flap.  There is patent runoff to the groin.  Discrepancy from preliminary report.    Electronically signed by: Jackelyn Gant  Date:    01/12/2024  Time:    08:17      Venkat Easton MD   01/14/2024

## 2024-01-14 NOTE — PT/OT/SLP EVAL
"Physical Therapy Evaluation    Patient Name:  Red Aaron Jr.   MRN:  [id number]    Recommendations:     Discharge therapy intensity: Low Intensity Therapy pending improvements in cognition, supervision, and ability to negotiate stairs to 2nd floor apartment (need to follow up with family)  Discharge Equipment Recommendations: walker, rolling   Barriers to discharge: Impaired mobility and Ongoing medical needs    Assessment:     Red Aaron Jr. is a 68 y.o. male admitted with a medical diagnosis of s/p infrarenal AAA repair, hypoxic respiratory failure s/p extubation, persistent sinus tachycardia, hx of schizophrenia, seizures.  He presents with the following impairments/functional limitations: weakness, impaired endurance, impaired functional mobility, gait instability, impaired balance, impaired cognition, decreased safety awareness . Pt overall Ramiro-CGA for functional mobility; alert, pleasant, and cooperative though somewhat disoriented. Reports he lives with his wife in a second floor apartment and was independent PTA but will have to confirm.    Rehab Prognosis: Good; patient would benefit from acute skilled PT services to address these deficits and reach maximum level of function.    Recent Surgery: Procedure(s) (LRB):  CREATION, BYPASS, ARTERIAL, AORTA TO FEMORAL, BILATERAL (N/A) 3 Days Post-Op    Plan:     During this hospitalization, patient to be seen 5 x/week to address the identified rehab impairments via gait training, therapeutic activities, therapeutic exercises, neuromuscular re-education and progress toward the following goals:    Plan of Care Expires:  02/14/24    Subjective     Chief Complaint: "The surgery was a success!"  Patient/Family Comments/goals: home  Pain/Comfort:  Pain Rating 1: 0/10    Patients cultural, spiritual, Gnosticist conflicts given the current situation: no    Living Environment:  Pt lives with his spouse in a second floor apartment with stairs to enter, has a " rail.  Prior to admission, patients level of function was independent and retired.  Equipment used at home: none.  DME owned (not currently used): none.  Upon discharge, patient will have assistance from wife?.    Objective:     Communicated with RN prior to session.  Patient found HOB elevated with alaniz catheter, peripheral IV, oxygen, telemetry, pulse ox (continuous)  upon PT entry to room.    General Precautions: Standard, fall SBP<160  Orthopedic Precautions:    Braces:    Respiratory Status: Nasal cannula, flow 1 L/min 95-96%  Blood Pressure: 106/69 -137 bpm      Exams:  Cognitive Exam:  Patient is oriented to Person and disoriented to time (wrong month and year), place (stated BR general), situation (knew he had surgery but couldn't tell me on what)  RLE Strength: WFL  LLE Strength: WFL  Skin integrity: Visible skin intact      Functional Mobility:  Bed Mobility:     Supine to Sit: moderate assistance  Transfers:     Sit to Stand:  minimum assistance with rolling walker  Gait: 1x20ft and 1x40ft with RW with CGA, cues for upright posture and increased step length. NBOS  Balance: Static sitting balance = SBA      AM-PAC 6 CLICK MOBILITY  Total Score:15       Treatment & Education:      Patient provided with verbal education education regarding PT role/goals/POC, fall prevention, safety awareness, and discharge/DME recommendations.  Understanding was verbalized, however additional teaching warranted.     Patient left up in chair with all lines intact, call button in reach, chair alarm on, and RN notified.    GOALS:   Multidisciplinary Problems       Physical Therapy Goals          Problem: Physical Therapy    Goal Priority Disciplines Outcome Goal Variances Interventions   Physical Therapy Goal     PT, PT/OT Ongoing, Progressing     Description: Goals to be met by: 24     Patient will increase functional independence with mobility by performin. Supine to sit with Charles  2. Sit to stand  transfer with Modified [place]  3. Bed to chair transfer with Modified [place] using Rolling Walker  4. Gait  x 150 feet with Modified [place] using Rolling Walker.   5. Ascend/descend 10 stair with right Handrails Contact Guard Assistance using No Assistive Device.                          History:     Past Medical History:   Diagnosis Date    Abdominal pain     Digestive disorder     acid indigestion    Hyperlipidemia     Hypertension     Infrarenal abdominal aortic aneurysm (AAA) without rupture     PTSD (post-traumatic stress disorder)     Schizophrenia     Seizures     last seizure 8 years ago       Past Surgical History:   Procedure Laterality Date    COLONOSCOPY      CREATION OF BILATERAL AORTOFEMORAL ARTERIAL BYPASS N/A 1/11/2024    Procedure: CREATION, BYPASS, ARTERIAL, AORTA TO FEMORAL, BILATERAL;  Surgeon: Royce Gatica MD;  Location: [facility];  Service: Peripheral Vascular;  Laterality: N/A;    TONSILLECTOMY         Time Tracking:     PT Received On: 01/14/24  PT Start Time: 1121     PT Stop Time: 1148  PT Total Time (min): 27 min     Billable Minutes: Evaluation MOD      01/14/2024

## 2024-01-14 NOTE — PLAN OF CARE
Problem: Physical Therapy  Goal: Physical Therapy Goal  Description: Goals to be met by: 24     Patient will increase functional independence with mobility by performin. Supine to sit with [place]  2. Sit to stand transfer with Modified [place]  3. Bed to chair transfer with Modified [place] using Rolling Walker  4. Gait  x 150 feet with Modified [place] using Rolling Walker.   5. Ascend/descend 10 stair with right Handrails Contact Guard Assistance using No Assistive Device.     Outcome: Ongoing, Progressing

## 2024-01-14 NOTE — PROGRESS NOTES
Vascular Surgery Progress Note  01/14/2024   Location:[facility]      Interval History:  The patient was transferred to a regular room last night.  He did have 1 episode of emesis last night.  He does not have any complaints.  He is very somnolent and requires deep stimulation for arousal.    ROS     Objective   Physical Exam  VITAL SIGNS: 24 HR MIN & MAX LAST    Temp  Min: 98.5 °F (36.9 °C)  Max: 99.7 °F (37.6 °C)  99.7 °F (37.6 °C)        BP  Min: 113/71  Max: 166/88  130/77     Pulse  Min: 114  Max: 125  (!) 117     Resp  Min: 19  Max: 35  (!) 22    SpO2  Min: 91 %  Max: 100 %  (!) 94 %      Recent Results (from the past 24 hour(s))   Comprehensive Metabolic Panel    Collection Time: 01/14/24  1:17 AM   Result Value Ref Range    Sodium Level 136 136 - 145 mmol/L    Potassium Level 3.7 3.5 - 5.1 mmol/L    Chloride 105 98 - 107 mmol/L    Carbon Dioxide 22 (L) 23 - 31 mmol/L    Glucose Level 98 82 - 115 mg/dL    Blood Urea Nitrogen 11.0 8.4 - 25.7 mg/dL    Creatinine 0.84 0.73 - 1.18 mg/dL    Calcium Level Total 8.0 (L) 8.8 - 10.0 mg/dL    Protein Total 5.3 (L) 5.8 - 7.6 gm/dL    Albumin Level 2.2 (L) 3.4 - 4.8 g/dL    Globulin 3.1 2.4 - 3.5 gm/dL    Albumin/Globulin Ratio 0.7 (L) 1.1 - 2.0 ratio    Bilirubin Total 0.8 <=1.5 mg/dL    Alkaline Phosphatase 51 40 - 150 unit/L    Alanine Aminotransferase 22 0 - 55 unit/L    Aspartate Aminotransferase 50 (H) 5 - 34 unit/L    eGFR >60 mls/min/1.73/m2   Magnesium    Collection Time: 01/14/24  1:17 AM   Result Value Ref Range    Magnesium Level 1.90 1.60 - 2.60 mg/dL   Phosphorus    Collection Time: 01/14/24  1:17 AM   Result Value Ref Range    Phosphorus Level 2.4 2.3 - 4.7 mg/dL   CBC with Differential    Collection Time: 01/14/24  1:17 AM   Result Value Ref Range    WBC 14.31 (H) 4.50 - 11.50 x10(3)/mcL    RBC 3.17 (L) 4.70 - 6.10 x10(6)/mcL    Hgb 9.6 (L) 14.0 - 18.0 g/dL    Hct 29.1 (L) 42.0 - 52.0 %    MCV 91.8 80.0 - 94.0 fL    MCH 30.3  27.0 - 31.0 pg    MCHC 33.0 33.0 - 36.0 g/dL    RDW 15.2 11.5 - 17.0 %    Platelet 111 (L) 130 - 400 x10(3)/mcL    MPV 10.5 (H) 7.4 - 10.4 fL    Neut % 83.2 %    Lymph % 8.6 %    Mono % 7.5 %    Eos % 0.1 %    Basophil % 0.3 %    Lymph # 1.23 0.6 - 4.6 x10(3)/mcL    Neut # 11.91 (H) 2.1 - 9.2 x10(3)/mcL    Mono # 1.07 0.1 - 1.3 x10(3)/mcL    Eos # 0.01 0 - 0.9 x10(3)/mcL    Baso # 0.04 <=0.2 x10(3)/mcL    IG# 0.05 (H) 0 - 0.04 x10(3)/mcL    IG% 0.3 %    NRBC% 0.0 %     X-Ray Chest 1 View   Final Result      Poor inspiratory effort accentuates the pulmonary vascular markings.      Slightly more confluent opacities in both bases left more than right although these might be related to the poor inspiratory effort infiltrate cannot be completely excluded.      Interval insertion of endotracheal tube and nasogastric tube         Electronically signed by: Andrea Jj   Date:    01/12/2024   Time:    08:46      CTA Abdomen and Pelvis   Final Result      1. Postsurgical changes of recent open abdominal aortic aneurysm aorto bi-iliac graft repair.  The graft is patent and normal caliber.  No appreciable arterial leak.   2. Expected postoperative gas at the excluded, opened aneurysm sac and within the abdomen.   3. Moderate free intraperitoneal fluid, possibly hemoperitoneum   4. Subtle patchy cortical hypoenhancement at the right kidney likely related to altered perfusion.  Changes are very subtle.  The right kidney does enhance in the right renal artery is patent.   5. Indeterminate rounded hyperdensity at the anterior lower abdomen, possibly small hematoma or clustered, collapsed small bowel loops   6. Atherosclerotic disease at the left external iliac artery with moderate stenosis.  Stenosis is new from previous exam.  Question component of vaso spasm/vaso constriction, mural hematoma or dissection flap.  There is patent runoff to the groin.  Discrepancy from preliminary report.         Electronically signed  by: Jackelyn Gant   Date:    01/12/2024   Time:    08:17      US Abdominal Aorta   Final Result      XR Gastric tube check, non-radiologist performed   Final Result            Intake/Output:  No intake/output data recorded.  I/O last 3 completed shifts:  In: 2345 [I.V.:1845; IV Piggyback:500]  Out: 3230 [Urine:3050; Other:180]     Exam:    The abdomen is soft and nontender.  There is a minimal amount of serosanguineous drainage from the lower abdominal incision.  It is decreased substantially from yesterday.  He has 2+ palpable dorsalis pedis pulses.  The patient is very somnolent but awakes with deep stimulation and responds appropriately.       Assessment & Plan   There are no hospital problems to display for this patient.    The patient is recovering after an open abdominal aortic aneurysm repair.  His H and H is stable.  His kidney function is stable.  Making good urine.  I think he was volume overloaded and responded well to the Lasix yesterday.  I think that the abdominal serosanguineous drainage we will decrease as his fluid status mobilize it.  He would benefit from placement of a Prevena dressing on the lower part of the abdomen however the hospital was low on canisters.  We can re-evaluate this tomorrow.  I am concerned about his mental status.  We will get the hospitalist to comment on that.  He can get out of bed into a chair.  He vomited last night and I would hold off on feeding him until we are sure that he is passing gas and having bowel movements.  We will change his IV fluids to normal saline D5 at 50 cc an hour.  He may benefit from Clinimix.    Royce Gatica

## 2024-01-15 NOTE — PT/OT/SLP PROGRESS
Physical Therapy Treatment    Patient Name:  Red Aaron Jr.   MRN:  [id number]    Recommendations:     Discharge therapy intensity: Low Intensity Therapy   Discharge Equipment Recommendations: walker, rolling  Barriers to discharge: Ongoing medical needs    Assessment:     Red Aaron Jr. is a 68 y.o. male admitted with a medical diagnosis of s/p infrarenal AAA repair, hypoxic respiratory failure s/p extubation, persistent sinus tachycardia, hx of schizophrenia, seizures.  He presents with the following impairments/functional limitations: weakness, impaired endurance, impaired functional mobility, gait instability, impaired balance, impaired cognition, decreased safety awareness. Pt tolerated session well, very motivated to participate in session. Pt ambulated 150'/150' with RW CGA-SBA, 1 seated rest break prior to stair negotiation for endurance. O2 remained stable at 95% on room air. Pt performed step ups to 6 in step x 13 reps with R railing CGA. O2 stable, no major LOB or safety concerns.     Rehab Prognosis: Good; patient would benefit from acute skilled PT services to address these deficits and reach maximum level of function.    Recent Surgery: Procedure(s) (LRB):  CREATION, BYPASS, ARTERIAL, AORTA TO FEMORAL, BILATERAL (N/A) 4 Days Post-Op    Plan:     During this hospitalization, patient to be seen 5 x/week to address the identified rehab impairments via gait training, therapeutic activities, therapeutic exercises, neuromuscular re-education and progress toward the following goals:    Plan of Care Expires:  02/14/24    Subjective     Chief Complaint: none noted at this time  Patient/Family Comments/goals: to go home  Pain/Comfort:  Pain Rating 1: 0/10      Objective:     Communicated with RN prior to session.  Patient found up in chair with peripheral IV, alaniz catheter, pulse ox (continuous), telemetry upon PT entry to room.     General Precautions: Standard, fall  Orthopedic Precautions: N/A  Braces:  N/A  Respiratory Status: Room air      Functional Mobility:  Transfers:     Sit to Stand:  stand by assistance with rolling walker  Gait: Pt ambulated 150'/150' with RW CGA-SBA, with 1 seated rest break for endurance.   Stairs:  Pt ascended/descended 13 stair(s) with No Assistive Device with right handrail with Contact Guard Assistance.     Education:  Patient provided with verbal education education regarding PT role/goals/POC, fall prevention, and safety awareness.  Understanding was verbalized.     Patient left up in chair with all lines intact and call button in reach..    GOALS:   Multidisciplinary Problems       Physical Therapy Goals          Problem: Physical Therapy    Goal Priority Disciplines Outcome Goal Variances Interventions   Physical Therapy Goal     PT, PT/OT Ongoing, Progressing     Description: Goals to be met by: 24     Patient will increase functional independence with mobility by performin. Supine to sit with [place]  2. Sit to stand transfer with Modified [place]  3. Bed to chair transfer with Modified [place] using Rolling Walker  4. Gait  x 150 feet with Modified [place] using Rolling Walker.   5. Ascend/descend 10 stair with right Handrails Contact Guard Assistance using No Assistive Device.                          Time Tracking:     PT Received On: 01/15/24  PT Start Time: 1411     PT Stop Time: 1434  PT Total Time (min): 23 min     Billable Minutes: Therapeutic Activity 23    Treatment Type: Treatment  PT/PTA: PT     Number of PTA visits since last PT visit: 1     01/15/2024

## 2024-01-15 NOTE — PROGRESS NOTES
[facility] Hospital Medicine Progress Note        Chief Complaint: Inpatient Follow-up     HPI:    68 y.o. male with a past medical history of essential hypertension, HLD, AAA, seizure disorder, schizophrenia, and PTSD presented to [facility] on 1/11/2024 for open repair of infrarenal AAA by Dr. Gatica.  Patient received 2 units packed red blood cells transfusion intraoperatively.  Patient remained intubated postoperatively and transferred to ICU for close monitoring.  Patient was started on Levophed for labile blood pressures.  On 01/12 patient began having blood drainage from abdominal suture site.  Two additional stitches were placed by ICU.  CTA revealed no active bleeding.  Wound VAC was placed to lower portion of abdomen to help with fluid drainage. Patient was also given IV Lasix 40 mg with good response.  Patient became tachycardic and hypertensive requiring Cleviprex. Patient was extubated on 01/12/2024.  Cleviprex was discontinued on 01/12/2023.  Patient was cleared for downgrade out of ICU on 01/13/2024.  Hospital medicine was consulted for transition of care and further medical management.     Post operatively after transferring to the floors, patient remained hemodynamically stable with  tachycardia.  Home med Beta-blockers resumed.  Interval Hx:   No acute events reported overnight.  Patient's fiancee at bedside.  Patient is alert, responding appropriately to questions denied any complaints, RN reported he worked with physical therapy yesterday.  Overall improving  Discussed with RN, no concerns of lethargy     Objective/physical exam:  General: In no acute distress, afebrile  Chest:  Unlabored  Heart:  Tachycardic  Abdomen: Soft, mildly distended, abdominal incision covered with dressing pad pad  serosanguineous drainage on dressing  MSK: Warm, no lower extremity edema  Neurologic:  Awake, alert, responding appropriate  VITAL SIGNS: 24 HRS MIN & MAX LAST   Temp  Min: 98.3  °F (36.8 °C)  Max: 100.6 °F (38.1 °C) 98.9 °F (37.2 °C)   BP  Min: 92/55  Max: 118/69 105/69   Pulse  Min: 101  Max: 123  104   Resp  Min: 20  Max: 22 (!) 22   SpO2  Min: 95 %  Max: 100 % 96 %     I have reviewed the following labs:  Recent Labs   Lab 24  0504 24  0117 01/15/24  0406   WBC 13.33* 14.31* 12.87*   RBC 3.16* 3.17* 2.86*   HGB 9.7* 9.6* 8.7*   HCT 28.4* 29.1* 26.9*   MCV 89.9 91.8 94.1*   MCH 30.7 30.3 30.4   MCHC 34.2 33.0 32.3*   RDW 15.7 15.2 14.9   * 111* 164   MPV 10.8* 10.5* 10.1     Recent Labs   Lab 24  1415 24  1504 24  0206 24  0712 24  0034 24  0117 01/15/24  0406   NA  --  137  --  139  --  135* 136 135*   K  --  4.0  --  4.4  --  3.5 3.7 3.6   CO2  --  21*  --  22*  --  23 22* 24   BUN  --  12.6  --  14.0  --  11.1 11.0 11.7   CREATININE  --  1.11  --  1.16  --  0.90 0.84 0.78   CALCIUM  --  8.8  --  7.5*  --  7.7* 8.0* 7.7*   PH 7.320*  --  7.420  --  7.350  --   --   --    MG  --   --   --  1.60  --  1.40* 1.90  --    ALBUMIN  --  3.2*  --  2.6*  --   --  2.2*  --    ALKPHOS  --  44  --  39*  --   --  51  --    ALT  --  9  --  5  --   --  22  --    AST  --  20  --  17  --   --  50*  --    BILITOT  --  0.6  --  0.3  --   --  0.8  --      Microbiology Results (last 7 days)       ** No results found for the last 168 hours. **             See below for Radiology    Scheduled Med:   benztropine  0.5 mg Oral BID    gabapentin  300 mg Oral Nightly    INV lisinopril  5 mg Oral Daily    metoprolol succinate  50 mg Oral Daily    mupirocin   Nasal BID    potassium bicarbonate  25 mEq Oral Once      Continuous Infusions:   sodium chloride 0.9% 50 mL/hr at 24 1800    dextrose 5 % and 0.45 % NaCl 75 mL/hr at 24 1622      PRN Meds:  0.9%  NaCl infusion (for blood administration), [] fentaNYL **FOLLOWED BY** fentaNYL, hydrALAZINE, morphine, ondansetron     Assessment/Plan:  S/p infrarenal AAA repair  Hypoxic  respiratory failure status post extubation now on nasal cannula  Persistent sinus tachycardia   Hypersomnolence  History of schizophrenia      Vascular surgery team following, Follow up recommendations , reviewed note  wound care  Labs with hemoglobin 8.7 drop from 9.6 yesterday, monitor H&H transfuse to keep hemoglobin greater than 8   Patient's tachycardia improved     Patient with T-max 100.6° monitor fever curve   Patient on olanzapine 10 b.i.d.   Get psychiatric team on board to adjust medications given history of schizophrenia to decrease any sedative effects   Currently on hold until psych evaluation  Case management consult for psych consult, DC planning  Diet as tolerated  Supportive care   Mobilize, PT/OT  Case discussed with patient's nurse  Labs in the a.m.    VTE prophylaxis:  SCDs    Patient condition:  Guarded    Anticipated discharge and Disposition:   To be decided home health versus nh      _____________________________________________________________________    Nutrition Status:    Radiology:  I have personally reviewed the following imaging and agree with the radiologist.     Cardiac catheterization    The estimated blood loss was none.    The procedure log was documented by No documenter listed and verified by   Royce Gatica MD.    Date: 1/12/2024  Time: 9:48 PM  X-Ray Chest 1 View  Narrative: EXAMINATION:  XR CHEST 1 VIEW    CPT 85965    CLINICAL HISTORY:  respiratory failure;    COMPARISON:  January 10, 2024    FINDINGS:  Examination reveals a poor inspiratory effort that accentuates the pulmonary vascular markings.    Mediastinal silhouette is within normal limits cardiac silhouette is not enlarged lung fields reveal some increase interstitial markings slightly more confluent in both bases although these might be related to a poor inspiratory effort and early infiltrate cannot be completely excluded.    Interval insertion of an endotracheal tube with the tip above the asiya  nasogastric tube is seen with the tip below the diaphragm  Impression: Poor inspiratory effort accentuates the pulmonary vascular markings.    Slightly more confluent opacities in both bases left more than right although these might be related to the poor inspiratory effort infiltrate cannot be completely excluded.    Interval insertion of endotracheal tube and nasogastric tube    Electronically signed by: Andrea Jj  Date:    01/12/2024  Time:    08:46  CTA Abdomen and Pelvis  Narrative: EXAMINATION:  CTA ABDOMEN AND PELVIS    CLINICAL HISTORY:  Aortic aneurysm (AAA), post-op;    TECHNIQUE:  Helically acquired images were obtained from the lung bases to the pubic symphysis prior to and after IV administration of contrast. Axial, sagittal, coronal and MIP reformations were interpreted.    Automated tube current modulation, weight-based exposure dosing, and/or iterative reconstruction technique utilized to reach lowest reasonably achievable exposure rate.    DLP: 1331 mGy*cm    COMPARISON:  CTA 11/22/2023    FINDINGS:  VASCULATURE:    Aorta: There are postsurgical changes of recent open abdominal aortic aneurysm graft repair with aorto bi-iliac graft.  The graft is patent and normal in caliber.  No appreciable arterial leak.  No delayed phase imaging obtained.  The excluded aneurysm sac demonstrates expected postoperative disruption and fluid and air within the aneurysm sac.  No arterial enhancement seen within the aneurysm sac.    Mesenteric arteries: Celiac and superior mesenteric arteries are patent.  CHEYANNE not seen.    Renal arteries:No significant stenosis.    Iliac arteries: Patent iliac grafts.  Patent runoff to the groin bilaterally.  There is atherosclerotic disease at the left external iliac artery with moderate stenosis.  This is new from previous exam.  Question component of vaso spasm, mural hematoma or dissection flap.  Possible short segment non propagating flap at the proximal left internal iliac  artery.    HEART: There are coronary artery calcifications.    LUNG BASES: Emphysema.  Trace basilar atelectasis.    LIVER: Normal arterial phase enhancement of the liver.    BILIARY: No calcified gallstones.    PANCREAS: No inflammatory change.    SPLEEN: Normal in size    ADRENALS: No mass.    KIDNEYS/URETERS: Subtle patchy cortical hypoenhancement at the right kidney.  No hydronephrosis.    GI TRACT/MESENTERY: Enteric tube terminates in the stomach.  Bowel is normal in caliber without evidence of obstruction.  Moderate colonic stool burden.     Hyperdensity in the lower abdomen/upper pelvis anteriorly measures 4.3 cm (14, 156).  This could be a small hematoma or clustered, collapsed bowel loops.    PERITONEUM AND RETROPERITONEUM: Moderate free intraperitoneal fluid, borderline hyperdense.Expected postoperative free intraperitoneal air, small to moderate.    LYMPH NODES: No enlarged lymph nodes by size criteria.    BLADDER: Collapsed about a Flores catheter.    REPRODUCTIVE ORGANS: Prostate indents upon the base of the urinary bladder.    SOFT TISSUES: Body wall edema.  Expected postoperative gas along the ventral incision.    BONES: Chronic partial fusion at L3-L4.  Impression: 1. Postsurgical changes of recent open abdominal aortic aneurysm aorto bi-iliac graft repair.  The graft is patent and normal caliber.  No appreciable arterial leak.  2. Expected postoperative gas at the excluded, opened aneurysm sac and within the abdomen.  3. Moderate free intraperitoneal fluid, possibly hemoperitoneum  4. Subtle patchy cortical hypoenhancement at the right kidney likely related to altered perfusion.  Changes are very subtle.  The right kidney does enhance in the right renal artery is patent.  5. Indeterminate rounded hyperdensity at the anterior lower abdomen, possibly small hematoma or clustered, collapsed small bowel loops  6. Atherosclerotic disease at the left external iliac artery with moderate stenosis.  Stenosis  is new from previous exam.  Question component of vaso spasm/vaso constriction, mural hematoma or dissection flap.  There is patent runoff to the groin.  Discrepancy from preliminary report.    Electronically signed by: Jackelyn Gant  Date:    01/12/2024  Time:    08:17      Venkat Easton MD   01/15/2024

## 2024-01-15 NOTE — PROGRESS NOTES
Vascular Surgery Progress Note  01/15/2024   Location:[facility]      Interval History:  The patient's MS improved yest.  He walked w PT and had a BM.  Tolerated diet.      ROS     Objective   Physical Exam  VITAL SIGNS: 24 HR MIN & MAX LAST    Temp  Min: 98.3 °F (36.8 °C)  Max: 100.6 °F (38.1 °C)  98.9 °F (37.2 °C)        BP  Min: 92/55  Max: 118/69  105/69     Pulse  Min: 101  Max: 123  104     Resp  Min: 20  Max: 22  (!) 22    SpO2  Min: 95 %  Max: 100 %  96 %      Recent Results (from the past 24 hour(s))   Basic Metabolic Panel    Collection Time: 01/15/24  4:06 AM   Result Value Ref Range    Sodium Level 135 (L) 136 - 145 mmol/L    Potassium Level 3.6 3.5 - 5.1 mmol/L    Chloride 103 98 - 107 mmol/L    Carbon Dioxide 24 23 - 31 mmol/L    Glucose Level 132 (H) 82 - 115 mg/dL    Blood Urea Nitrogen 11.7 8.4 - 25.7 mg/dL    Creatinine 0.78 0.73 - 1.18 mg/dL    BUN/Creatinine Ratio 15     Calcium Level Total 7.7 (L) 8.8 - 10.0 mg/dL    Anion Gap 8.0 mEq/L    eGFR >60 mls/min/1.73/m2   CBC with Differential    Collection Time: 01/15/24  4:06 AM   Result Value Ref Range    WBC 12.87 (H) 4.50 - 11.50 x10(3)/mcL    RBC 2.86 (L) 4.70 - 6.10 x10(6)/mcL    Hgb 8.7 (L) 14.0 - 18.0 g/dL    Hct 26.9 (L) 42.0 - 52.0 %    MCV 94.1 (H) 80.0 - 94.0 fL    MCH 30.4 27.0 - 31.0 pg    MCHC 32.3 (L) 33.0 - 36.0 g/dL    RDW 14.9 11.5 - 17.0 %    Platelet 164 130 - 400 x10(3)/mcL    MPV 10.1 7.4 - 10.4 fL    Neut % 81.7 %    Lymph % 8.8 %    Mono % 8.4 %    Eos % 0.5 %    Basophil % 0.2 %    Lymph # 1.13 0.6 - 4.6 x10(3)/mcL    Neut # 10.51 (H) 2.1 - 9.2 x10(3)/mcL    Mono # 1.08 0.1 - 1.3 x10(3)/mcL    Eos # 0.07 0 - 0.9 x10(3)/mcL    Baso # 0.03 <=0.2 x10(3)/mcL    IG# 0.05 (H) 0 - 0.04 x10(3)/mcL    IG% 0.4 %    NRBC% 0.0 %     X-Ray Chest 1 View   Final Result      Poor inspiratory effort accentuates the pulmonary vascular markings.      Slightly more confluent opacities in both bases left more than  right although these might be related to the poor inspiratory effort infiltrate cannot be completely excluded.      Interval insertion of endotracheal tube and nasogastric tube         Electronically signed by: Andrea Jj   Date:    01/12/2024   Time:    08:46      CTA Abdomen and Pelvis   Final Result      1. Postsurgical changes of recent open abdominal aortic aneurysm aorto bi-iliac graft repair.  The graft is patent and normal caliber.  No appreciable arterial leak.   2. Expected postoperative gas at the excluded, opened aneurysm sac and within the abdomen.   3. Moderate free intraperitoneal fluid, possibly hemoperitoneum   4. Subtle patchy cortical hypoenhancement at the right kidney likely related to altered perfusion.  Changes are very subtle.  The right kidney does enhance in the right renal artery is patent.   5. Indeterminate rounded hyperdensity at the anterior lower abdomen, possibly small hematoma or clustered, collapsed small bowel loops   6. Atherosclerotic disease at the left external iliac artery with moderate stenosis.  Stenosis is new from previous exam.  Question component of vaso spasm/vaso constriction, mural hematoma or dissection flap.  There is patent runoff to the groin.  Discrepancy from preliminary report.         Electronically signed by: Jackelyn Gant   Date:    01/12/2024   Time:    08:17      US Abdominal Aorta   Final Result      XR Gastric tube check, non-radiologist performed   Final Result            Intake/Output:  No intake/output data recorded.  I/O last 3 completed shifts:  In: 420 [P.O.:420]  Out: 1750 [Urine:1750]     Exam:    The patient has 2+ DP rubin.  Abd soft NT, minimal drainage from incision,        Assessment & Plan   There are no hospital problems to display for this patient.    Doing much better today.  Advance diet, PT/OT and mobilize.  Will get  to help with dispo.  Ethan Gatica

## 2024-01-16 NOTE — PLAN OF CARE
01/16/24 1038   Discharge Assessment   Assessment Type Discharge Planning Assessment   Confirmed/corrected address, phone number and insurance Yes   Source of Information patient;family   Reason For Admission AAA   People in Home friend(s)   Do you expect to return to your current living situation? Yes   Do you have help at home or someone to help you manage your care at home? Yes   Who are your caregiver(s) and their phone number(s)? Hayley/Friend/[phone]   Current cognitive status: Alert/Oriented   Walking or Climbing Stairs Difficulty no   Dressing/Bathing Difficulty no   Home Accessibility stairs to enter home   Number of Stairs, Main Entrance other (see comments)  (13)   Equipment Currently Used at Home none   Readmission within 30 days? No   Do you take prescription medications? No   Do you have prescription coverage? Yes   Do you have any problems affording any of your prescribed medications? Yes   Is the patient taking medications as prescribed? no   Who is going to help you get home at discharge? Hayley   How do you get to doctors appointments? family or friend will provide   Are you on dialysis? No   Do you take coumadin? No   Discharge Plan A Home Health   Discharge Plan B Home Health   DME Needed Upon Discharge  walker, rolling   Discharge Plan discussed with: Patient;Friend     Hayley will assist during recovery. List of home health providers given. FOC obtained for [facility]. [facility]. [facility] for [facility] health and [facility].

## 2024-01-16 NOTE — PT/OT/SLP PROGRESS
Physical Therapy Treatment    Patient Name:  Red Aaron Jr.   MRN:  [id number]    Recommendations:     Discharge therapy intensity: Low Intensity Therapy   Discharge Equipment Recommendations: walker, rolling  Barriers to discharge: Ongoing medical needs    Assessment:     Red Aaron Jr. is a 68 y.o. male admitted with a medical diagnosis of s/p infrarenal AAA repair, hypoxic respiratory failure s/p extubation, persistent sinus tachycardia, hx of schizophrenia, seizures.  He presents with the following impairments/functional limitations: weakness, impaired endurance, impaired functional mobility, gait instability, impaired balance, impaired cognition, decreased safety awareness. Pt tolerated session well, able to perform stair training with significant other present. Pt ambulated 150'/150' with RW with seated rest break in between in prep for stair training. Pt then ascended/descended 10 steps with R railing CGA.     Rehab Prognosis: Good; patient would benefit from acute skilled PT services to address these deficits and reach maximum level of function.    Recent Surgery: Procedure(s) (LRB):  CREATION, BYPASS, ARTERIAL, AORTA TO FEMORAL, BILATERAL (N/A) 5 Days Post-Op    Plan:     During this hospitalization, patient to be seen 5 x/week to address the identified rehab impairments via gait training, therapeutic activities, therapeutic exercises, neuromuscular re-education and progress toward the following goals:    Plan of Care Expires:  02/14/24    Subjective     Chief Complaint: none noted at this time  Patient/Family Comments/goals: to go home   Pain/Comfort:  Pain Rating 1: 0/10      Objective:     Communicated with RN prior to session.  Patient found up in chair with peripheral IV, alaniz catheter, pulse ox (continuous), telemetry upon PT entry to room.     General Precautions: Standard, fall  Orthopedic Precautions: N/A  Braces: N/A  Respiratory Status: Room air      Functional Mobility:  Transfers:      Sit to Stand:  stand by assistance with rolling walker  Gait: Pt ambulated 150'/150' with RW SBA   Stairs:  Pt ascended/descended 10 stair(s) with No Assistive Device with right handrail with Contact Guard Assistance.       Education:  Patient provided with verbal education education regarding PT role/goals/POC, fall prevention, and safety awareness.  Understanding was verbalized.     Patient left up in chair with all lines intact, call button in reach, and significant other present..    GOALS:   Multidisciplinary Problems       Physical Therapy Goals          Problem: Physical Therapy    Goal Priority Disciplines Outcome Goal Variances Interventions   Physical Therapy Goal     PT, PT/OT Ongoing, Progressing     Description: Goals to be met by: 24     Patient will increase functional independence with mobility by performin. Supine to sit with [place]  2. Sit to stand transfer with Modified [place]  3. Bed to chair transfer with Modified [place] using Rolling Walker  4. Gait  x 150 feet with Modified [place] using Rolling Walker.   5. Ascend/descend 10 stair with right Handrails Contact Guard Assistance using No Assistive Device.                          Time Tracking:     PT Received On: 24  PT Start Time: 1002     PT Stop Time: 1025  PT Total Time (min): 23 min     Billable Minutes: Gait Training 15 and Therapeutic Activity 8    Treatment Type: Treatment  PT/PTA: PT     Number of PTA visits since last PT visit: 2024

## 2024-01-16 NOTE — CONSULTS
"1/16/2024  Red Aaron Jr.   1955   [id number]            Psychiatry Initial Consult Note    Date of Admission: 1/11/2024  5:44 AM    Chief Complaint: "I'm doing good". Psychiatric Consult requested for "adjust medications given hx of schizophrenia to decrease somnolence/sedative effects"    SUBJECTIVE:   History of Present Illness:   Red Aaron Jr. is a 68 y.o. male with a history of HTN, HLD, AAA, seizure disorder, schizophrenia, and PTSD who presented to [facility] on 1/11/24 for open repair of infrarenal AAA.  Psychiatry has been consulted to adjust medications due to somnolence/sedative effects. Seen at the bedside with devonte present. [facility] reports no exacerbations of schizophrenia for past few months. Does report he tends to be somnolent in the evening and is not typically as alert as he is during this interview. He reports an appropriate mood and displays a bright affect. Thoughts are goal-directed with occasional circumstantial thinking. No signs of psychosis present. Denies SI, HI, or hallucinations.      Past Psychiatric History:   Previous Psychiatric Hospitalizations: Yes   Previous Medication Trials: Unable to obtain  Previous Suicide Attempts: Denies  Outpatient psychiatrist: Dr. Wong ([facility])    Past Medical/Surgical History:   Past Medical History:   Diagnosis Date    Abdominal pain     Digestive disorder     acid indigestion    Hyperlipidemia     Hypertension     Infrarenal abdominal aortic aneurysm (AAA) without rupture     PTSD (post-traumatic stress disorder)     Schizophrenia     Seizures     last seizure 8 years ago     Past Surgical History:   Procedure Laterality Date    COLONOSCOPY      CREATION OF BILATERAL AORTOFEMORAL ARTERIAL BYPASS N/A 1/11/2024    Procedure: CREATION, BYPASS, ARTERIAL, AORTA TO FEMORAL, BILATERAL;  Surgeon: Royce Gatica MD;  Location: [facility];  Service: Peripheral Vascular;  Laterality: N/A;    TONSILLECTOMY   "         Family Psychiatric History:   Denies    Allergies:   Review of patient's allergies indicates:  No Known Allergies    Substance Abuse History:   Tobacco: Pack per week  Alcohol: Denies  Illicit Substances: Denies  Treatment: Denies      Current Medications:   Home Psychiatric Meds: Benztropine 0.5mg PO BID; Haldol Decanoate 150mg IOM Q21 days; zyprexa 10mg PO BID    Scheduled Meds:    benztropine  0.5 mg Oral BID    gabapentin  300 mg Oral Nightly    INV lisinopril  5 mg Oral Daily    metoprolol succinate  50 mg Oral Daily    mupirocin   Nasal BID    potassium bicarbonate  25 mEq Oral Once      PRN Meds: 0.9%  NaCl infusion (for blood administration), [] fentaNYL **FOLLOWED BY** fentaNYL, hydrALAZINE, morphine, ondansetron   Psychotherapeutics (From admission, onward)      None              Social History:  Housing Status: Lives with fiance  Relationship Status/Sexual Orientation: Single   Children: 7  Education: Completed trade school in [place] [facility]   Employment Status/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    history: [employer], 21.5 years, honorable discharge  History of physical/sexual abuse: Denies   Access to gun: Deneis       Legal History:   Past Charges/Incarcerations: Denies   Pending charges: Denies      OBJECTIVE:       Vitals   Vitals:    24 1146   BP: 134/73   Pulse: 102   Resp: 16   Temp: 97.1 °F (36.2 °C)        Labs/Imaging/Studies:   Recent Results (from the past 48 hour(s))   Basic Metabolic Panel    Collection Time: 01/15/24  4:06 AM   Result Value Ref Range    Sodium Level 135 (L) 136 - 145 mmol/L    Potassium Level 3.6 3.5 - 5.1 mmol/L    Chloride 103 98 - 107 mmol/L    Carbon Dioxide 24 23 - 31 mmol/L    Glucose Level 132 (H) 82 - 115 mg/dL    Blood Urea Nitrogen 11.7 8.4 - 25.7 mg/dL    Creatinine 0.78 0.73 - 1.18 mg/dL    BUN/Creatinine Ratio 15     Calcium Level Total 7.7 (L) 8.8 - 10.0 mg/dL    Anion Gap 8.0 mEq/L    eGFR >60 mls/min/1.73/m2  "  CBC with Differential    Collection Time: 01/15/24  4:06 AM   Result Value Ref Range    WBC 12.87 (H) 4.50 - 11.50 x10(3)/mcL    RBC 2.86 (L) 4.70 - 6.10 x10(6)/mcL    Hgb 8.7 (L) 14.0 - 18.0 g/dL    Hct 26.9 (L) 42.0 - 52.0 %    MCV 94.1 (H) 80.0 - 94.0 fL    MCH 30.4 27.0 - 31.0 pg    MCHC 32.3 (L) 33.0 - 36.0 g/dL    RDW 14.9 11.5 - 17.0 %    Platelet 164 130 - 400 x10(3)/mcL    MPV 10.1 7.4 - 10.4 fL    Neut % 81.7 %    Lymph % 8.8 %    Mono % 8.4 %    Eos % 0.5 %    Basophil % 0.2 %    Lymph # 1.13 0.6 - 4.6 x10(3)/mcL    Neut # 10.51 (H) 2.1 - 9.2 x10(3)/mcL    Mono # 1.08 0.1 - 1.3 x10(3)/mcL    Eos # 0.07 0 - 0.9 x10(3)/mcL    Baso # 0.03 <=0.2 x10(3)/mcL    IG# 0.05 (H) 0 - 0.04 x10(3)/mcL    IG% 0.4 %    NRBC% 0.0 %   CBC with Differential    Collection Time: 01/16/24  8:12 AM   Result Value Ref Range    WBC 11.85 (H) 4.50 - 11.50 x10(3)/mcL    RBC 2.75 (L) 4.70 - 6.10 x10(6)/mcL    Hgb 8.2 (L) 14.0 - 18.0 g/dL    Hct 25.6 (L) 42.0 - 52.0 %    MCV 93.1 80.0 - 94.0 fL    MCH 29.8 27.0 - 31.0 pg    MCHC 32.0 (L) 33.0 - 36.0 g/dL    RDW 14.7 11.5 - 17.0 %    Platelet 172 130 - 400 x10(3)/mcL    MPV 9.6 7.4 - 10.4 fL    Neut % 78.9 %    Lymph % 11.3 %    Mono % 8.0 %    Eos % 1.1 %    Basophil % 0.3 %    Lymph # 1.34 0.6 - 4.6 x10(3)/mcL    Neut # 9.34 (H) 2.1 - 9.2 x10(3)/mcL    Mono # 0.95 0.1 - 1.3 x10(3)/mcL    Eos # 0.13 0 - 0.9 x10(3)/mcL    Baso # 0.04 <=0.2 x10(3)/mcL    IG# 0.05 (H) 0 - 0.04 x10(3)/mcL    IG% 0.4 %    NRBC% 0.0 %      No results found for: "PHENYTOIN", "PHENOBARB", "VALPROATE", "CBMZ"        Psychiatric Mental Status Exam:  General Appearance: appropriately dressed, dressed in hospital garb, in no acute distress, Sitting in chair, disheveled  Arousal: alert with clear sensorium  Behavior: normal; cooperative; reasonably friendly, pleasant, and polite; appropriate eye-contact; under good behavioral control  Movements and Motor Activity: no abnormal involuntary movements " noted  Orientation: intact; oriented fully to person, place, time and situation  Speech: normal rate, rhythm, volume, tone and pitch  Mood: Euthymic  Affect: normal, euthymic, reactive, full-range, mood-congruent, appropriate to situation and context  Thought Process: goal-directed, circumstantial  Associations: no loosening of associations  Thought Content and Perceptions: no suicidal or homicidal ideation, no auditory or visual hallucinations, no paranoid ideation, no ideas of reference, no evidence of delusions or psychosis  Recent and Remote Memory: grossly intact, able to recall relevant and salient information from the recent and remote past; per interview/observation with patient  Attention and Concentration: grossly intact, attentive to the conversation and not readily distractible; per interview/observation with patient  Fund of Knowledge: grossly intact, used appropriate vocabulary and demonstrated an awareness of current events; based on history, vocabulary, fund of knowledge, syntax, grammar, and content  Insight: questionable; based on understanding of severity of illness and HPI  Judgment: adequate; based on patient's behavior and HPI          ASSESSMENT/PLAN:   Diagnoses:  SCHIZOPHRENIA AND OTHER PSYCHOTIC DISORDERS; Schziophrenia: Undifferentiated Type  F 20.9      Past Medical History:   Diagnosis Date    Abdominal pain     Digestive disorder     acid indigestion    Hyperlipidemia     Hypertension     Infrarenal abdominal aortic aneurysm (AAA) without rupture     PTSD (post-traumatic stress disorder)     Schizophrenia     Seizures     last seizure 8 years ago          Problem lists and Management Plans:  Medication Management  Will restart olanzapine but at 10mg PO QHS  Continue Haloperidol IM outpatient  Continue benztropine 0.5mg PO BID  Safe for discharge when medically stable  Recommend follow-up with outpatient psychiatric provider   Will follow-up tomorrow      Hong Rutherford

## 2024-01-17 PROBLEM — I71.43 INFRARENAL ABDOMINAL AORTIC ANEURYSM (AAA) WITHOUT RUPTURE: Status: ACTIVE | Noted: 2024-01-01

## 2024-01-17 NOTE — PROGRESS NOTES
[facility]  Hospital Medicine Progress Note        Chief Complaint: Inpatient Follow-up     HPI:    68 y.o. male with a past medical history of essential hypertension, HLD, AAA, seizure disorder, schizophrenia, and PTSD presented to [facility] on 1/11/2024 for open repair of infrarenal AAA by Dr. Gatica.  Patient received 2 units packed red blood cells transfusion intraoperatively.  Patient remained intubated postoperatively and transferred to ICU for close monitoring.  Patient was started on Levophed for labile blood pressures.  On 01/12 patient began having blood drainage from abdominal suture site.  Two additional stitches were placed by ICU.  CTA revealed no active bleeding.  Wound VAC was placed to lower portion of abdomen to help with fluid drainage. Patient was also given IV Lasix 40 mg with good response.  Patient became tachycardic and hypertensive requiring Cleviprex. Patient was extubated on 01/12/2024.  Cleviprex was discontinued on 01/12/2023.  Patient was cleared for downgrade out of ICU on 01/13/2024.  Hospital medicine was consulted for transition of care and further medical management.     Post operatively after transferring to the floors, patient remained hemodynamically stable with  tachycardia.  Home med Beta-blockers resumed.  Psychiatric was consulted for med adjustment due to history of schizophrenia.  Recommended to continue olanzapine 10 mg at bedtime and benztropine 0.5 mg p.o. b.i.d..  He will also get his Haldol injection on 01/26/2024    Interval Hx:   Patient seen and examined by bedside.  Sitting up in chair.  Wife in the room.  No acute overnight events however did have an large volume is episode of diarrhea this morning.  Remains afebrile.  Denies any abdominal pain, nausea, or vomiting.  Discussed with RN, no concerns of lethargy     Objective/physical exam:  General: In no acute distress, afebrile  Chest:  Unlabored  Heart:  Tachycardic  Abdomen: Soft,  mildly distended, abdominal incision covered with dressing pad pad  serosanguineous drainage on dressing  MSK: Warm, no lower extremity edema  Neurologic:  Awake, alert, responding appropriate  VITAL SIGNS: 24 HRS MIN & MAX LAST   Temp  Min: 98.4 °F (36.9 °C)  Max: 99.6 °F (37.6 °C) 98.4 °F (36.9 °C)   BP  Min: 113/68  Max: 139/77 127/69   Pulse  Min: 99  Max: 106  101   Resp  Min: 18  Max: 18 18   SpO2  Min: 92 %  Max: 99 % 99 %     I have reviewed the following labs:  Recent Labs   Lab 01/15/24  0406 01/16/24  0812 01/17/24  0331   WBC 12.87* 11.85* 13.63*   RBC 2.86* 2.75* 3.14*   HGB 8.7* 8.2* 9.6*   HCT 26.9* 25.6* 29.0*   MCV 94.1* 93.1 92.4   MCH 30.4 29.8 30.6   MCHC 32.3* 32.0* 33.1   RDW 14.9 14.7 14.6    172 198   MPV 10.1 9.6 9.8       Recent Labs   Lab 01/11/24  1415 01/11/24  1504 01/11/24 2010 01/12/24  0206 01/12/24  0712 01/13/24  0034 01/14/24  0117 01/15/24  0406   NA  --  137  --  139  --  135* 136 135*   K  --  4.0  --  4.4  --  3.5 3.7 3.6   CO2  --  21*  --  22*  --  23 22* 24   BUN  --  12.6  --  14.0  --  11.1 11.0 11.7   CREATININE  --  1.11  --  1.16  --  0.90 0.84 0.78   CALCIUM  --  8.8  --  7.5*  --  7.7* 8.0* 7.7*   PH 7.320*  --  7.420  --  7.350  --   --   --    MG  --   --   --  1.60  --  1.40* 1.90  --    ALBUMIN  --  3.2*  --  2.6*  --   --  2.2*  --    ALKPHOS  --  44  --  39*  --   --  51  --    ALT  --  9  --  5  --   --  22  --    AST  --  20  --  17  --   --  50*  --    BILITOT  --  0.6  --  0.3  --   --  0.8  --        Microbiology Results (last 7 days)       Procedure Component Value Units Date/Time    Stool Culture [[id number]] Collected: 01/17/24 1246    Order Status: Sent Specimen: Stool Updated: 01/17/24 1246    Clostridium Diff Toxin, A & B, EIA [[id number]] Collected: 01/17/24 1239    Order Status: Sent Specimen: Stool Updated: 01/17/24 1245             See below for Radiology    Scheduled Med:   benztropine  0.5 mg Oral BID    gabapentin  300 mg Oral Nightly     INV lisinopril  5 mg Oral Daily    metoprolol succinate  50 mg Oral Daily    OLANZapine  10 mg Oral QHS    potassium bicarbonate  25 mEq Oral Once      Continuous Infusions:   sodium chloride 0.9% 50 mL/hr at 24 1800    dextrose 5 % and 0.45 % NaCl 75 mL/hr at 24 1622      PRN Meds:  0.9%  NaCl infusion (for blood administration), [] fentaNYL **FOLLOWED BY** fentaNYL, hydrALAZINE, morphine, ondansetron     Assessment/Plan:  S/p infrarenal AAA repair  Hypoxic respiratory failure status post extubation now on nasal cannula  Persistent sinus tachycardia   Hypersomnolence  History of schizophrenia  Acute diarrhea, unclear etiology      Vascular surgery team following, Follow up recommendations , reviewed note  wound care  Hemoglobin stable, 9.6 this a.m.  Patient on olanzapine 10 b.i.d.   Psychiatry consult for adjustment of medications, recommend to restart olanzapine but at 10 mg p.o. every night, continue haloperidol IM outpatient, continue benztropine 0.5 mg p.o. b.i.d.  Continue PT and OT, recommend low intensity therapy  Home health set up  Patient had large loose diarrhea this a.m., denies any abdominal pain, leukocytosis slightly up.  Obtain C diff and stool culture   Continue IV fluids   Diet as tolerated  Supportive care   Labs in the a.m.    VTE prophylaxis:  SCDs    Patient condition:  Guarded    Anticipated discharge and Disposition:  Home with wife with home health      _____________________________________________________________________    Nutrition Status:    Radiology:  I have personally reviewed the following imaging and agree with the radiologist.     Cardiac catheterization    The estimated blood loss was none.    The procedure log was documented by No documenter listed and verified by   Royce Gatica MD.    Date: 2024  Time: 9:48 PM  X-Ray Chest 1 View  Narrative: EXAMINATION:  XR CHEST 1 VIEW    CPT 95516    CLINICAL HISTORY:  respiratory  failure;    COMPARISON:  January 10, 2024    FINDINGS:  Examination reveals a poor inspiratory effort that accentuates the pulmonary vascular markings.    Mediastinal silhouette is within normal limits cardiac silhouette is not enlarged lung fields reveal some increase interstitial markings slightly more confluent in both bases although these might be related to a poor inspiratory effort and early infiltrate cannot be completely excluded.    Interval insertion of an endotracheal tube with the tip above the asiya nasogastric tube is seen with the tip below the diaphragm  Impression: Poor inspiratory effort accentuates the pulmonary vascular markings.    Slightly more confluent opacities in both bases left more than right although these might be related to the poor inspiratory effort infiltrate cannot be completely excluded.    Interval insertion of endotracheal tube and nasogastric tube    Electronically signed by: Andrea Jj  Date:    01/12/2024  Time:    08:46  CTA Abdomen and Pelvis  Narrative: EXAMINATION:  CTA ABDOMEN AND PELVIS    CLINICAL HISTORY:  Aortic aneurysm (AAA), post-op;    TECHNIQUE:  Helically acquired images were obtained from the lung bases to the pubic symphysis prior to and after IV administration of contrast. Axial, sagittal, coronal and MIP reformations were interpreted.    Automated tube current modulation, weight-based exposure dosing, and/or iterative reconstruction technique utilized to reach lowest reasonably achievable exposure rate.    DLP: 1331 mGy*cm    COMPARISON:  CTA 11/22/2023    FINDINGS:  VASCULATURE:    Aorta: There are postsurgical changes of recent open abdominal aortic aneurysm graft repair with aorto bi-iliac graft.  The graft is patent and normal in caliber.  No appreciable arterial leak.  No delayed phase imaging obtained.  The excluded aneurysm sac demonstrates expected postoperative disruption and fluid and air within the aneurysm sac.  No arterial enhancement  seen within the aneurysm sac.    Mesenteric arteries: Celiac and superior mesenteric arteries are patent.  CHEYANNE not seen.    Renal arteries:No significant stenosis.    Iliac arteries: Patent iliac grafts.  Patent runoff to the groin bilaterally.  There is atherosclerotic disease at the left external iliac artery with moderate stenosis.  This is new from previous exam.  Question component of vaso spasm, mural hematoma or dissection flap.  Possible short segment non propagating flap at the proximal left internal iliac artery.    HEART: There are coronary artery calcifications.    LUNG BASES: Emphysema.  Trace basilar atelectasis.    LIVER: Normal arterial phase enhancement of the liver.    BILIARY: No calcified gallstones.    PANCREAS: No inflammatory change.    SPLEEN: Normal in size    ADRENALS: No mass.    KIDNEYS/URETERS: Subtle patchy cortical hypoenhancement at the right kidney.  No hydronephrosis.    GI TRACT/MESENTERY: Enteric tube terminates in the stomach.  Bowel is normal in caliber without evidence of obstruction.  Moderate colonic stool burden.     Hyperdensity in the lower abdomen/upper pelvis anteriorly measures 4.3 cm (14, 156).  This could be a small hematoma or clustered, collapsed bowel loops.    PERITONEUM AND RETROPERITONEUM: Moderate free intraperitoneal fluid, borderline hyperdense.Expected postoperative free intraperitoneal air, small to moderate.    LYMPH NODES: No enlarged lymph nodes by size criteria.    BLADDER: Collapsed about a Flores catheter.    REPRODUCTIVE ORGANS: Prostate indents upon the base of the urinary bladder.    SOFT TISSUES: Body wall edema.  Expected postoperative gas along the ventral incision.    BONES: Chronic partial fusion at L3-L4.  Impression: 1. Postsurgical changes of recent open abdominal aortic aneurysm aorto bi-iliac graft repair.  The graft is patent and normal caliber.  No appreciable arterial leak.  2. Expected postoperative gas at the excluded, opened aneurysm  sac and within the abdomen.  3. Moderate free intraperitoneal fluid, possibly hemoperitoneum  4. Subtle patchy cortical hypoenhancement at the right kidney likely related to altered perfusion.  Changes are very subtle.  The right kidney does enhance in the right renal artery is patent.  5. Indeterminate rounded hyperdensity at the anterior lower abdomen, possibly small hematoma or clustered, collapsed small bowel loops  6. Atherosclerotic disease at the left external iliac artery with moderate stenosis.  Stenosis is new from previous exam.  Question component of vaso spasm/vaso constriction, mural hematoma or dissection flap.  There is patent runoff to the groin.  Discrepancy from preliminary report.    Electronically signed by: Jackelyn Gant  Date:    01/12/2024  Time:    08:17      Rylie Rose DO  Department of Hospital Medicine  [facility]  01/17/2024

## 2024-01-17 NOTE — PROGRESS NOTES
Inpatient Nutrition Assessment    Admit Date: 1/11/2024   Total duration of encounter: 6 days   Patient Age: 68 y.o.    Nutrition Recommendation/Prescription     Continue current diet as tolerated  Add Boost Plus (provides 360 kcal and 14 g protein per container)  Encouraged adequate PO intake  Monitor appetite/PO intake, weight, and labs    Communication of Recommendations: reviewed with patient and reviewed with family    Nutrition Assessment     Malnutrition Assessment/Nutrition-Focused Physical Exam    Malnutrition Context: other (see comments) (Does not meet criteria at this time) (01/17/24 1455)  Malnutrition Level: other (see comments) (Does not meet criteria at this time) (01/17/24 1455)  Energy Intake (Malnutrition): other (see comments) (Does not meet criteria) (01/17/24 1455)  Weight Loss (Malnutrition): other (see comments) (Does not meet criteria per EMR) (01/17/24 1455)  Subcutaneous Fat (Malnutrition): other (see comments) (Does not meet criteria) (01/17/24 1455)           Muscle Mass (Malnutrition): mild depletion (01/17/24 1455)  Sikh Region (Muscle Loss): mild depletion  Clavicle Bone Region (Muscle Loss): mild depletion  Clavicle and Acromion Bone Region (Muscle Loss): mild depletion  Scapular Bone Region (Muscle Loss): mild depletion              Fluid Accumulation (Malnutrition): other (see comments) (Does not meet criteria) (01/17/24 1455)        A minimum of two characteristics is recommended for diagnosis of either severe or non-severe malnutrition.    Chart Review    Reason Seen: length of stay    Malnutrition Screening Tool Results   Have you recently lost weight without trying?: No  Have you been eating poorly because of a decreased appetite?: No   MST Score: 0   Diagnosis:  S/p infrarenal AAA repair  Hypoxic respiratory failure status post extubation now on nasal cannula  Persistent sinus tachycardia   Hypersomnolence  History of schizophrenia  Acute diarrhea, unclear  "etiology    Relevant Medical History:   Essential hypertension  Hyperlipidemia  AAA  Seizure disorder  Schizophrenia  PTSD    Nutrition-Related Medications:   Scheduled Meds:   benztropine  0.5 mg Oral BID    gabapentin  300 mg Oral Nightly    INV lisinopril  5 mg Oral Daily    metoprolol succinate  50 mg Oral Daily    OLANZapine  10 mg Oral QHS    potassium bicarbonate  25 mEq Oral Once     Continuous Infusions:   sodium chloride 0.9% 50 mL/hr at 24 1800    dextrose 5 % and 0.45 % NaCl 75 mL/hr at 24 1622     PRN Meds:.0.9%  NaCl infusion (for blood administration), [] fentaNYL **FOLLOWED BY** fentaNYL, hydrALAZINE, morphine, ondansetron    Calorie Containing IV Medications: no significant kcals from medications at this time    Nutrition-Related Labs:  1/15/2024: Na 135. Ca 7.7, Gluc 132    Nutrition Orders:   Diet Soft & Bite Sized (IDDSI Level 6)  Diet Adult Regular      Appetite/Oral Intake: good/50-75% of meals    Factors Affecting Nutritional Intake: none identified    Food/Anabaptist/Cultural Preferences: none reported    Food Allergies: none reported    Wound(s):   none noted    Last Bowel Movement: 24    Comments    2024: Pt reports a good appetite/PO intake prior to admit and currently, per tray observation, pt had 50-75% PO intake. Pt denies N/V and chewing/swallowing difficulties. Last BM noted. Per EMR, pt weighed 57.1 kg on 2023 (1.6% wt loss in 6 months, insignificant). Encouraged adequate PO intake. Pt agreeable to ONS as preventative MNT. Will monitor.    Anthropometrics    Height: 5' 7" (170.2 cm) Height Method: Stated  Last Weight: 55.9 kg (123 lb 3.8 oz) (24 0622) Weight Method: Standard Scale  BMI (Calculated): 19.3  BMI Classification: underweight (BMI less than 22 if >65 years of age)        Ideal Body Weight (IBW), Male: 148 lb     % Ideal Body Weight, Male (lb): 83.27 %                 Usual Body Weight (UBW), k.1 kg  % Usual Body Weight: 98.1   "   Usual Weight Provided By: EMR weight history    Wt Readings from Last 5 Encounters:   24 55.9 kg (123 lb 3.8 oz)   23 58.5 kg (129 lb)   10/24/23 57.2 kg (126 lb)   10/17/23 57.5 kg (126 lb 12.8 oz)   23 59.4 kg (131 lb)     Weight Change(s) Since Admission:   2024: 55.9 kg  Wt Readings from Last 1 Encounters:   24 0622 55.9 kg (123 lb 3.8 oz)   24 1540 61.2 kg (135 lb)   Admit Weight: 61.2 kg (135 lb) (24 1540), Weight Method: Stated    Estimated Needs    Weight Used For Calorie Calculations: 55.9 kg (123 lb 3.8 oz)  Energy Calorie Requirements (kcal): 8110-5377 (30-35 kcal/kg)  Energy Need Method: Kcal/kg  Weight Used For Protein Calculations: 55.9 kg (123 lb 3.8 oz)  Protein Requirements: 67-84 (1.2-1.5 g/kg)  Fluid Requirements (mL): 1677 (1 mL/kcal)  Temp (24hrs), Av °F (37.2 °C), Min:98.4 °F (36.9 °C), Max:99.6 °F (37.6 °C)       Enteral Nutrition    Patient not receiving enteral nutrition at this time.    Parenteral Nutrition    Patient not receiving parenteral nutrition support at this time.    Evaluation of Received Nutrient Intake    Calories: not meeting estimated needs  Protein: not meeting estimated needs    Patient Education    Not applicable.    Nutrition Diagnosis     PES: Inadequate oral intake related to chronic illness as evidenced by mild muscle wasting. (new)    Interventions/Goals     Intervention(s): general/healthful diet and commercial beverage    Goal: Meet greater than 80% of nutritional needs by follow-up. (new)    Monitoring & Evaluation     Dietitian will monitor food and beverage intake, weight, electrolyte/renal panel, glucose/endocrine profile, and gastrointestinal profile.    Nutrition Risk/Follow-Up: moderate (follow-up in 3-5 days)   Please consult if re-assessment needed sooner.

## 2024-01-17 NOTE — PROGRESS NOTES
2024  Red Aaron Jr.   1955   [id number]       Psychiatry Consult Progress Note     SUBJECTIVE:   Red Aaron Jr. is a 68 y.o. male seen for follow-up for medication management. Seen at the bedside with devonte present. Restarted nighttime dose of olanzapine yesterday. Alert and oriented x4 at this time. No signs of psychosis. Denies SI/HI. Reports euthymic mood and displays bright affect.       Current Medications:   Scheduled Meds:    benztropine  0.5 mg Oral BID    gabapentin  300 mg Oral Nightly    INV lisinopril  5 mg Oral Daily    metoprolol succinate  50 mg Oral Daily    OLANZapine  10 mg Oral QHS    potassium bicarbonate  25 mEq Oral Once      PRN Meds: 0.9%  NaCl infusion (for blood administration), [] fentaNYL **FOLLOWED BY** fentaNYL, hydrALAZINE, morphine, ondansetron   Psychotherapeutics (From admission, onward)      Start     Stop Route Frequency Ordered    24 2100  OLANZapine tablet 10 mg         -- Oral Nightly 24 1424            Allergies:   Review of patient's allergies indicates:  No Known Allergies     OBJECTIVE:   Vitals   Vitals:    24 0931   BP: 113/68   Pulse: 105   Resp:    Temp:         Labs/Imaging/Studies:   Recent Results (from the past 36 hour(s))   CBC with Differential    Collection Time: 24  8:12 AM   Result Value Ref Range    WBC 11.85 (H) 4.50 - 11.50 x10(3)/mcL    RBC 2.75 (L) 4.70 - 6.10 x10(6)/mcL    Hgb 8.2 (L) 14.0 - 18.0 g/dL    Hct 25.6 (L) 42.0 - 52.0 %    MCV 93.1 80.0 - 94.0 fL    MCH 29.8 27.0 - 31.0 pg    MCHC 32.0 (L) 33.0 - 36.0 g/dL    RDW 14.7 11.5 - 17.0 %    Platelet 172 130 - 400 x10(3)/mcL    MPV 9.6 7.4 - 10.4 fL    Neut % 78.9 %    Lymph % 11.3 %    Mono % 8.0 %    Eos % 1.1 %    Basophil % 0.3 %    Lymph # 1.34 0.6 - 4.6 x10(3)/mcL    Neut # 9.34 (H) 2.1 - 9.2 x10(3)/mcL    Mono # 0.95 0.1 - 1.3 x10(3)/mcL    Eos # 0.13 0 - 0.9 x10(3)/mcL    Baso # 0.04 <=0.2 x10(3)/mcL    IG# 0.05 (H) 0 - 0.04 x10(3)/mcL    IG%  0.4 %    NRBC% 0.0 %   CBC Without Differential    Collection Time: 01/17/24  3:31 AM   Result Value Ref Range    WBC 13.63 (H) 4.50 - 11.50 x10(3)/mcL    RBC 3.14 (L) 4.70 - 6.10 x10(6)/mcL    Hgb 9.6 (L) 14.0 - 18.0 g/dL    Hct 29.0 (L) 42.0 - 52.0 %    MCV 92.4 80.0 - 94.0 fL    MCH 30.6 27.0 - 31.0 pg    MCHC 33.1 33.0 - 36.0 g/dL    RDW 14.6 11.5 - 17.0 %    Platelet 198 130 - 400 x10(3)/mcL    MPV 9.8 7.4 - 10.4 fL    NRBC% 0.0 %            Psychiatric Mental Status Exam:  General Appearance: appropriately dressed, dressed in hospital garb, in no acute distress, Sitting in chair, disheveled  Arousal: alert with clear sensorium  Behavior: normal; cooperative; reasonably friendly, pleasant, and polite; appropriate eye-contact; under good behavioral control  Movements and Motor Activity: no abnormal involuntary movements noted  Orientation: intact; oriented fully to person, place, time and situation  Speech: normal rate, rhythm, volume, tone and pitch  Mood: Euthymic  Affect: normal, euthymic, reactive, full-range, mood-congruent, appropriate to situation and context  Thought Process: goal-directed, circumstantial  Associations: no loosening of associations  Thought Content and Perceptions: no suicidal or homicidal ideation, no auditory or visual hallucinations, no paranoid ideation, no ideas of reference, no evidence of delusions or psychosis  Recent and Remote Memory: grossly intact, able to recall relevant and salient information from the recent and remote past; per interview/observation with patient  Attention and Concentration: grossly intact, attentive to the conversation and not readily distractible; per interview/observation with patient  Fund of Knowledge: grossly intact, used appropriate vocabulary and demonstrated an awareness of current events; based on history, vocabulary, fund of knowledge, syntax, grammar, and content  Insight: questionable; based on understanding of severity of illness and  HPI  Judgment: adequate; based on patient's behavior and HPI    ASSESSMENT/PLAN:   Diagnosis:   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        Hong Rutherford

## 2024-01-17 NOTE — PROGRESS NOTES
[facility] Hospital Medicine Progress Note        Chief Complaint: Inpatient Follow-up     HPI:    68 y.o. male with a past medical history of essential hypertension, HLD, AAA, seizure disorder, schizophrenia, and PTSD presented to [facility] on 1/11/2024 for open repair of infrarenal AAA by Dr. Gatica.  Patient received 2 units packed red blood cells transfusion intraoperatively.  Patient remained intubated postoperatively and transferred to ICU for close monitoring.  Patient was started on Levophed for labile blood pressures.  On 01/12 patient began having blood drainage from abdominal suture site.  Two additional stitches were placed by ICU.  CTA revealed no active bleeding.  Wound VAC was placed to lower portion of abdomen to help with fluid drainage. Patient was also given IV Lasix 40 mg with good response.  Patient became tachycardic and hypertensive requiring Cleviprex. Patient was extubated on 01/12/2024.  Cleviprex was discontinued on 01/12/2023.  Patient was cleared for downgrade out of ICU on 01/13/2024.  Hospital medicine was consulted for transition of care and further medical management.     Post operatively after transferring to the floors, patient remained hemodynamically stable with  tachycardia.  Home med Beta-blockers resumed.  Interval Hx:   No acute overnight events.  Continue working well with therapy.  Labs reviewed and vitals reviewed both stable.  Overall improving  Discussed with RN, no concerns of lethargy     Objective/physical exam:  General: In no acute distress, afebrile  Chest:  Unlabored  Heart:  Tachycardic  Abdomen: Soft, mildly distended, abdominal incision covered with dressing pad pad  serosanguineous drainage on dressing  MSK: Warm, no lower extremity edema  Neurologic:  Awake, alert, responding appropriate  VITAL SIGNS: 24 HRS MIN & MAX LAST   Temp  Min: 97.1 °F (36.2 °C)  Max: 100 °F (37.8 °C) 99.6 °F (37.6 °C)   BP  Min: 103/62  Max: 134/73 127/75    Pulse  Min: 100  Max: 109  102   Resp  Min: 16  Max: 20 16   SpO2  Min: 93 %  Max: 98 % 95 %     I have reviewed the following labs:  Recent Labs   Lab 24  0117 01/15/24  0406 24  0812   WBC 14.31* 12.87* 11.85*   RBC 3.17* 2.86* 2.75*   HGB 9.6* 8.7* 8.2*   HCT 29.1* 26.9* 25.6*   MCV 91.8 94.1* 93.1   MCH 30.3 30.4 29.8   MCHC 33.0 32.3* 32.0*   RDW 15.2 14.9 14.7   * 164 172   MPV 10.5* 10.1 9.6       Recent Labs   Lab 24  1415 24  1504 24  0206 24  0712 24  0034 01/14/24  0117 01/15/24  0406   NA  --  137  --  139  --  135* 136 135*   K  --  4.0  --  4.4  --  3.5 3.7 3.6   CO2  --  21*  --  22*  --  23 22* 24   BUN  --  12.6  --  14.0  --  11.1 11.0 11.7   CREATININE  --  1.11  --  1.16  --  0.90 0.84 0.78   CALCIUM  --  8.8  --  7.5*  --  7.7* 8.0* 7.7*   PH 7.320*  --  7.420  --  7.350  --   --   --    MG  --   --   --  1.60  --  1.40* 1.90  --    ALBUMIN  --  3.2*  --  2.6*  --   --  2.2*  --    ALKPHOS  --  44  --  39*  --   --  51  --    ALT  --  9  --  5  --   --  22  --    AST  --  20  --  17  --   --  50*  --    BILITOT  --  0.6  --  0.3  --   --  0.8  --        Microbiology Results (last 7 days)       ** No results found for the last 168 hours. **             See below for Radiology    Scheduled Med:   benztropine  0.5 mg Oral BID    gabapentin  300 mg Oral Nightly    INV lisinopril  5 mg Oral Daily    metoprolol succinate  50 mg Oral Daily    mupirocin   Nasal BID    OLANZapine  10 mg Oral QHS    potassium bicarbonate  25 mEq Oral Once      Continuous Infusions:   sodium chloride 0.9% 50 mL/hr at 24 1800    dextrose 5 % and 0.45 % NaCl 75 mL/hr at 24 1622      PRN Meds:  0.9%  NaCl infusion (for blood administration), [] fentaNYL **FOLLOWED BY** fentaNYL, hydrALAZINE, morphine, ondansetron     Assessment/Plan:  S/p infrarenal AAA repair  Hypoxic respiratory failure status post extubation now on nasal  cannula  Persistent sinus tachycardia   Hypersomnolence  History of schizophrenia      Vascular surgery team following, Follow up recommendations , reviewed note  wound care  Labs with hemoglobin 8.2 drop from 9.6 yesterday, monitor H&H transfuse to keep hemoglobin greater than 8; continues to slowly drop  Patient on olanzapine 10 b.i.d.   Psychiatry consult for adjustment of medications  Continue PT and OT, recommend low intensity therapy  Home health set up  Diet as tolerated  Supportive care   Labs in the a.m.    VTE prophylaxis:  SCDs    Patient condition:  Guarded    Anticipated discharge and Disposition:   To be decided home health versus nh      _____________________________________________________________________    Nutrition Status:    Radiology:  I have personally reviewed the following imaging and agree with the radiologist.     Cardiac catheterization    The estimated blood loss was none.    The procedure log was documented by No documenter listed and verified by   Royce Gatica MD.    Date: 1/12/2024  Time: 9:48 PM  X-Ray Chest 1 View  Narrative: EXAMINATION:  XR CHEST 1 VIEW    CPT 05877    CLINICAL HISTORY:  respiratory failure;    COMPARISON:  January 10, 2024    FINDINGS:  Examination reveals a poor inspiratory effort that accentuates the pulmonary vascular markings.    Mediastinal silhouette is within normal limits cardiac silhouette is not enlarged lung fields reveal some increase interstitial markings slightly more confluent in both bases although these might be related to a poor inspiratory effort and early infiltrate cannot be completely excluded.    Interval insertion of an endotracheal tube with the tip above the asiya nasogastric tube is seen with the tip below the diaphragm  Impression: Poor inspiratory effort accentuates the pulmonary vascular markings.    Slightly more confluent opacities in both bases left more than right although these might be related to the poor inspiratory  effort infiltrate cannot be completely excluded.    Interval insertion of endotracheal tube and nasogastric tube    Electronically signed by: Andrea Jj  Date:    01/12/2024  Time:    08:46  CTA Abdomen and Pelvis  Narrative: EXAMINATION:  CTA ABDOMEN AND PELVIS    CLINICAL HISTORY:  Aortic aneurysm (AAA), post-op;    TECHNIQUE:  Helically acquired images were obtained from the lung bases to the pubic symphysis prior to and after IV administration of contrast. Axial, sagittal, coronal and MIP reformations were interpreted.    Automated tube current modulation, weight-based exposure dosing, and/or iterative reconstruction technique utilized to reach lowest reasonably achievable exposure rate.    DLP: 1331 mGy*cm    COMPARISON:  CTA 11/22/2023    FINDINGS:  VASCULATURE:    Aorta: There are postsurgical changes of recent open abdominal aortic aneurysm graft repair with aorto bi-iliac graft.  The graft is patent and normal in caliber.  No appreciable arterial leak.  No delayed phase imaging obtained.  The excluded aneurysm sac demonstrates expected postoperative disruption and fluid and air within the aneurysm sac.  No arterial enhancement seen within the aneurysm sac.    Mesenteric arteries: Celiac and superior mesenteric arteries are patent.  CHEYANNE not seen.    Renal arteries:No significant stenosis.    Iliac arteries: Patent iliac grafts.  Patent runoff to the groin bilaterally.  There is atherosclerotic disease at the left external iliac artery with moderate stenosis.  This is new from previous exam.  Question component of vaso spasm, mural hematoma or dissection flap.  Possible short segment non propagating flap at the proximal left internal iliac artery.    HEART: There are coronary artery calcifications.    LUNG BASES: Emphysema.  Trace basilar atelectasis.    LIVER: Normal arterial phase enhancement of the liver.    BILIARY: No calcified gallstones.    PANCREAS: No inflammatory change.    SPLEEN: Normal in  size    ADRENALS: No mass.    KIDNEYS/URETERS: Subtle patchy cortical hypoenhancement at the right kidney.  No hydronephrosis.    GI TRACT/MESENTERY: Enteric tube terminates in the stomach.  Bowel is normal in caliber without evidence of obstruction.  Moderate colonic stool burden.     Hyperdensity in the lower abdomen/upper pelvis anteriorly measures 4.3 cm (14, 156).  This could be a small hematoma or clustered, collapsed bowel loops.    PERITONEUM AND RETROPERITONEUM: Moderate free intraperitoneal fluid, borderline hyperdense.Expected postoperative free intraperitoneal air, small to moderate.    LYMPH NODES: No enlarged lymph nodes by size criteria.    BLADDER: Collapsed about a Flores catheter.    REPRODUCTIVE ORGANS: Prostate indents upon the base of the urinary bladder.    SOFT TISSUES: Body wall edema.  Expected postoperative gas along the ventral incision.    BONES: Chronic partial fusion at L3-L4.  Impression: 1. Postsurgical changes of recent open abdominal aortic aneurysm aorto bi-iliac graft repair.  The graft is patent and normal caliber.  No appreciable arterial leak.  2. Expected postoperative gas at the excluded, opened aneurysm sac and within the abdomen.  3. Moderate free intraperitoneal fluid, possibly hemoperitoneum  4. Subtle patchy cortical hypoenhancement at the right kidney likely related to altered perfusion.  Changes are very subtle.  The right kidney does enhance in the right renal artery is patent.  5. Indeterminate rounded hyperdensity at the anterior lower abdomen, possibly small hematoma or clustered, collapsed small bowel loops  6. Atherosclerotic disease at the left external iliac artery with moderate stenosis.  Stenosis is new from previous exam.  Question component of vaso spasm/vaso constriction, mural hematoma or dissection flap.  There is patent runoff to the groin.  Discrepancy from preliminary report.    Electronically signed by: Jackelyn  Stalin  Date:    01/12/2024  Time:    08:17      Rylie Rose DO  Department of Hospital Medicine  [facility]  01/16/2024

## 2024-01-17 NOTE — PROGRESS NOTES
Vascular Surgery Progress Note  01/16/2024   Location:[facility]      Interval History:  No problems overnight.  Eating, tolerating BM, good day with PT OT    ROS     Objective   Physical Exam  VITAL SIGNS: 24 HR MIN & MAX LAST    Temp  Min: 97.1 °F (36.2 °C)  Max: 100 °F (37.8 °C)  99.6 °F (37.6 °C)        BP  Min: 103/62  Max: 134/73  127/75     Pulse  Min: 100  Max: 109  102     Resp  Min: 16  Max: 20  16    SpO2  Min: 93 %  Max: 98 %  95 %      Recent Results (from the past 24 hour(s))   CBC with Differential    Collection Time: 01/16/24  8:12 AM   Result Value Ref Range    WBC 11.85 (H) 4.50 - 11.50 x10(3)/mcL    RBC 2.75 (L) 4.70 - 6.10 x10(6)/mcL    Hgb 8.2 (L) 14.0 - 18.0 g/dL    Hct 25.6 (L) 42.0 - 52.0 %    MCV 93.1 80.0 - 94.0 fL    MCH 29.8 27.0 - 31.0 pg    MCHC 32.0 (L) 33.0 - 36.0 g/dL    RDW 14.7 11.5 - 17.0 %    Platelet 172 130 - 400 x10(3)/mcL    MPV 9.6 7.4 - 10.4 fL    Neut % 78.9 %    Lymph % 11.3 %    Mono % 8.0 %    Eos % 1.1 %    Basophil % 0.3 %    Lymph # 1.34 0.6 - 4.6 x10(3)/mcL    Neut # 9.34 (H) 2.1 - 9.2 x10(3)/mcL    Mono # 0.95 0.1 - 1.3 x10(3)/mcL    Eos # 0.13 0 - 0.9 x10(3)/mcL    Baso # 0.04 <=0.2 x10(3)/mcL    IG# 0.05 (H) 0 - 0.04 x10(3)/mcL    IG% 0.4 %    NRBC% 0.0 %     X-Ray Chest 1 View   Final Result      Poor inspiratory effort accentuates the pulmonary vascular markings.      Slightly more confluent opacities in both bases left more than right although these might be related to the poor inspiratory effort infiltrate cannot be completely excluded.      Interval insertion of endotracheal tube and nasogastric tube         Electronically signed by: Andrea Jj   Date:    01/12/2024   Time:    08:46      CTA Abdomen and Pelvis   Final Result      1. Postsurgical changes of recent open abdominal aortic aneurysm aorto bi-iliac graft repair.  The graft is patent and normal caliber.  No appreciable arterial leak.   2. Expected postoperative  gas at the excluded, opened aneurysm sac and within the abdomen.   3. Moderate free intraperitoneal fluid, possibly hemoperitoneum   4. Subtle patchy cortical hypoenhancement at the right kidney likely related to altered perfusion.  Changes are very subtle.  The right kidney does enhance in the right renal artery is patent.   5. Indeterminate rounded hyperdensity at the anterior lower abdomen, possibly small hematoma or clustered, collapsed small bowel loops   6. Atherosclerotic disease at the left external iliac artery with moderate stenosis.  Stenosis is new from previous exam.  Question component of vaso spasm/vaso constriction, mural hematoma or dissection flap.  There is patent runoff to the groin.  Discrepancy from preliminary report.         Electronically signed by: Jackelyn Gant   Date:    01/12/2024   Time:    08:17      US Abdominal Aorta   Final Result      XR Gastric tube check, non-radiologist performed   Final Result            Intake/Output:  No intake/output data recorded.  I/O last 3 completed shifts:  In: 1740 [P.O.:1740]  Out: 1240 [Urine:1240]     Exam:    Abd soft, NT, still small amout of serosang drainage, DP 2+       Assessment & Plan   There are no hospital problems to display for this patient.    Doing well sp AAA repair.  Hb 8.2, recheck savanna.  If ok, then could go home.  Appreciate Psych eval.      Royce Gatica

## 2024-01-18 NOTE — PROGRESS NOTES
Vascular Surgery Progress Note  01/18/2024   Location:[facility]      Interval History:  The patient was having diarrhea.  He was started on Imodium.  History C diff was negative.    ROS     Objective   Physical Exam  VITAL SIGNS: 24 HR MIN & MAX LAST    Temp  Min: 98.4 °F (36.9 °C)  Max: 99.6 °F (37.6 °C)  99.3 °F (37.4 °C)        BP  Min: 103/63  Max: 145/76  116/71     Pulse  Min: 100  Max: 110  104     Resp  Min: 18  Max: 20  18    SpO2  Min: 94 %  Max: 97 %  95 %      Recent Results (from the past 24 hour(s))   CBC Without Differential    Collection Time: 01/18/24  4:09 AM   Result Value Ref Range    WBC 14.45 (H) 4.50 - 11.50 x10(3)/mcL    RBC 2.84 (L) 4.70 - 6.10 x10(6)/mcL    Hgb 8.5 (L) 14.0 - 18.0 g/dL    Hct 26.8 (L) 42.0 - 52.0 %    MCV 94.4 (H) 80.0 - 94.0 fL    MCH 29.9 27.0 - 31.0 pg    MCHC 31.7 (L) 33.0 - 36.0 g/dL    RDW 14.6 11.5 - 17.0 %    Platelet 192 130 - 400 x10(3)/mcL    MPV 11.3 (H) 7.4 - 10.4 fL    NRBC% 0.0 %   Basic Metabolic Panel    Collection Time: 01/18/24  4:10 AM   Result Value Ref Range    Sodium Level 130 (L) 136 - 145 mmol/L    Potassium Level 4.0 3.5 - 5.1 mmol/L    Chloride 99 98 - 107 mmol/L    Carbon Dioxide 24 23 - 31 mmol/L    Glucose Level 95 82 - 115 mg/dL    Blood Urea Nitrogen 7.8 (L) 8.4 - 25.7 mg/dL    Creatinine 0.67 (L) 0.73 - 1.18 mg/dL    BUN/Creatinine Ratio 12     Calcium Level Total 8.2 (L) 8.8 - 10.0 mg/dL    Anion Gap 7.0 mEq/L    eGFR >60 mls/min/1.73/m2     X-Ray Chest 1 View   Final Result      Poor inspiratory effort accentuates the pulmonary vascular markings.      Slightly more confluent opacities in both bases left more than right although these might be related to the poor inspiratory effort infiltrate cannot be completely excluded.      Interval insertion of endotracheal tube and nasogastric tube         Electronically signed by: Anrdea Jj   Date:    01/12/2024   Time:    08:46      CTA Abdomen and Pelvis    Final Result      1. Postsurgical changes of recent open abdominal aortic aneurysm aorto bi-iliac graft repair.  The graft is patent and normal caliber.  No appreciable arterial leak.   2. Expected postoperative gas at the excluded, opened aneurysm sac and within the abdomen.   3. Moderate free intraperitoneal fluid, possibly hemoperitoneum   4. Subtle patchy cortical hypoenhancement at the right kidney likely related to altered perfusion.  Changes are very subtle.  The right kidney does enhance in the right renal artery is patent.   5. Indeterminate rounded hyperdensity at the anterior lower abdomen, possibly small hematoma or clustered, collapsed small bowel loops   6. Atherosclerotic disease at the left external iliac artery with moderate stenosis.  Stenosis is new from previous exam.  Question component of vaso spasm/vaso constriction, mural hematoma or dissection flap.  There is patent runoff to the groin.  Discrepancy from preliminary report.         Electronically signed by: Jackelyn Gant   Date:    01/12/2024   Time:    08:17      US Abdominal Aorta   Final Result      XR Gastric tube check, non-radiologist performed   Final Result            Intake/Output:  I/O this shift:  In: 780 [P.O.:780]  Out: -   I/O last 3 completed shifts:  In: 1980 [P.O.:1980]  Out: 1325 [Urine:1325]     Exam:    Abdomen soft nontender.  Dorsalis pedis pulses 2+.       Assessment & Plan   Active Hospital Problems    Diagnosis  POA    *Infrarenal abdominal aortic aneurysm (AAA) without rupture [I71.43]  Yes      Resolved Hospital Problems   No resolved problems to display.     The patient is doing well status post open aneurysm repair.  He still has some diarrhea which is responding to Imodium.  We will plan to discharge him home in the morning if his labs look good.    Royce Gatica

## 2024-01-18 NOTE — PROGRESS NOTES
[facility] Hospital Medicine Progress Note        Chief Complaint: Inpatient Follow-up triple A repair    HPI:    68 y.o. male with a past medical history of essential hypertension, HLD, AAA, seizure disorder, schizophrenia, and PTSD presented to [facility] on 1/11/2024 for open repair of infrarenal AAA by Dr. Gatica.  Patient received 2 units packed red blood cells transfusion intraoperatively.  Patient remained intubated postoperatively and transferred to ICU for close monitoring.  Patient was started on Levophed for labile blood pressures.  On 01/12 patient began having blood drainage from abdominal suture site.  Two additional stitches were placed by ICU.  CTA revealed no active bleeding.  Wound VAC was placed to lower portion of abdomen to help with fluid drainage. Patient was also given IV Lasix 40 mg with good response.  Patient became tachycardic and hypertensive requiring Cleviprex. Patient was extubated on 01/12/2024.  Cleviprex was discontinued on 01/12/2023.  Patient was cleared for downgrade out of ICU on 01/13/2024.  Hospital medicine was consulted for transition of care and further medical management.     Post operatively after transferring to the floors, patient remained hemodynamically stable with  tachycardia.  Home med Beta-blockers resumed.  Psychiatric was consulted for med adjustment due to history of schizophrenia.  Recommended to continue olanzapine 10 mg at bedtime and benztropine 0.5 mg p.o. b.i.d..  He will also get his Haldol injection on 01/26/2024    Interval Hx:   1/17/24-Patient seen and examined by bedside.  Sitting up in chair.  Wife in the room.  No acute overnight events however did have an large volume is episode of diarrhea this morning.  Remains afebrile.  Denies any abdominal pain, nausea, or vomiting.  Discussed with RN, no concerns of lethargy     1/18/24 dr pennington - julio cesar w prevena wound vac, will call vascular for plans concerning this upon  discharge. Answers ok/yes to all questions while holding  all cardiac pads in one hand close to mouth. Plans are for him to go home with home health     Objective/physical exam:  General: In no acute distress, afebrile  Chest:  Unlabored  Heart:  Tachycardic  Abdomen: Soft, hypoactive bowel sounds/ abdominal incision covered with wound vac   MSK: Warm, no lower extremity edema  Neurologic:  Awake, alert, responding appropriate  VITAL SIGNS: 24 HRS MIN & MAX LAST   Temp  Min: 98.4 °F (36.9 °C)  Max: 100.2 °F (37.9 °C) 98.5 °F (36.9 °C)   BP  Min: 113/68  Max: 145/76 119/70   Pulse  Min: 100  Max: 108  108   Resp  Min: 18  Max: 20 20   SpO2  Min: 93 %  Max: 99 % (!) 94 %     I have reviewed the following labs:  Recent Labs   Lab 01/16/24  0812 01/17/24  0331 01/18/24  0409   WBC 11.85* 13.63* 14.45*   RBC 2.75* 3.14* 2.84*   HGB 8.2* 9.6* 8.5*   HCT 25.6* 29.0* 26.8*   MCV 93.1 92.4 94.4*   MCH 29.8 30.6 29.9   MCHC 32.0* 33.1 31.7*   RDW 14.7 14.6 14.6    198 192   MPV 9.6 9.8 11.3*       Recent Labs   Lab 01/11/24  1415 01/11/24  1504 01/11/24 2010 01/12/24  0206 01/12/24  0712 01/13/24  0034 01/14/24  0117 01/15/24  0406 01/18/24  0410   NA  --  137  --  139  --  135* 136 135* 130*   K  --  4.0  --  4.4  --  3.5 3.7 3.6 4.0   CO2  --  21*  --  22*  --  23 22* 24 24   BUN  --  12.6  --  14.0  --  11.1 11.0 11.7 7.8*   CREATININE  --  1.11  --  1.16  --  0.90 0.84 0.78 0.67*   CALCIUM  --  8.8  --  7.5*  --  7.7* 8.0* 7.7* 8.2*   PH 7.320*  --  7.420  --  7.350  --   --   --   --    MG  --   --   --  1.60  --  1.40* 1.90  --   --    ALBUMIN  --  3.2*  --  2.6*  --   --  2.2*  --   --    ALKPHOS  --  44  --  39*  --   --  51  --   --    ALT  --  9  --  5  --   --  22  --   --    AST  --  20  --  17  --   --  50*  --   --    BILITOT  --  0.6  --  0.3  --   --  0.8  --   --        Microbiology Results (last 7 days)       Procedure Component Value Units Date/Time    Clostridium Diff Toxin, A & B, EIA [[id number]]   (Normal) Collected: 24 1239    Order Status: Completed Specimen: Stool Updated: 24 1411     Clostridium Difficile GDH Antigen Negative     Clostridium Difficile Toxin A/B Negative    Stool Culture [[id number]] Collected: 24 1246    Order Status: Resulted Specimen: Stool Updated: 24 1246             See below for Radiology    Scheduled Med:   benztropine  0.5 mg Oral BID    gabapentin  300 mg Oral Nightly    INV lisinopril  5 mg Oral Daily    metoprolol succinate  50 mg Oral Daily    OLANZapine  10 mg Oral QHS    potassium bicarbonate  25 mEq Oral Once      Continuous Infusions:   sodium chloride 0.9% 50 mL/hr at 24 1800    dextrose 5 % and 0.45 % NaCl 75 mL/hr at 24 1622      PRN Meds:  0.9%  NaCl infusion (for blood administration), [] fentaNYL **FOLLOWED BY** fentaNYL, hydrALAZINE, morphine, ondansetron     Assessment/Plan:  S/p infrarenal AAA repair  Hypoxic respiratory failure status post extubation now on nasal cannula  Persistent sinus tachycardia   Hypersomnolence  History of schizophrenia  Acute diarrhea x one       Vascular surgery team following, Follow up recommendations , reviewed note  wound care  Patient on olanzapine 10 b.i.d.   Psychiatry consult for adjustment of medications, recommend to restart olanzapine but at 10 mg p.o. every night, continue haloperidol IM outpatient, continue benztropine 0.5 mg p.o. b.i.d.  Continue PT and OT, recommend low intensity therapy  Home health set up in progress  Up trending leukocytosis   C diff  negative and stool culture pending   Dc IV fluids   Diet as tolerated  Supportive care   Will ask vascular plans for prevena wound vac.  Case discussed with - working on home health, will ask vascular plans for wound vac.  Am labs  for possible discharge home w home health     VTE prophylaxis:  SCDs    Patient condition:  Guarded    Anticipated discharge and Disposition:  Home with wife with home  health      _____________________________________________________________________    Nutrition Status:    Radiology:  I have personally reviewed the following imaging and agree with the radiologist.     Cardiac catheterization    The estimated blood loss was none.    The procedure log was documented by No documenter listed and verified by   Royce Gatica MD.    Date: 1/12/2024  Time: 9:48 PM  X-Ray Chest 1 View  Narrative: EXAMINATION:  XR CHEST 1 VIEW    CPT 63725    CLINICAL HISTORY:  respiratory failure;    COMPARISON:  January 10, 2024    FINDINGS:  Examination reveals a poor inspiratory effort that accentuates the pulmonary vascular markings.    Mediastinal silhouette is within normal limits cardiac silhouette is not enlarged lung fields reveal some increase interstitial markings slightly more confluent in both bases although these might be related to a poor inspiratory effort and early infiltrate cannot be completely excluded.    Interval insertion of an endotracheal tube with the tip above the asiya nasogastric tube is seen with the tip below the diaphragm  Impression: Poor inspiratory effort accentuates the pulmonary vascular markings.    Slightly more confluent opacities in both bases left more than right although these might be related to the poor inspiratory effort infiltrate cannot be completely excluded.    Interval insertion of endotracheal tube and nasogastric tube    Electronically signed by: Andrea Jj  Date:    01/12/2024  Time:    08:46  CTA Abdomen and Pelvis  Narrative: EXAMINATION:  CTA ABDOMEN AND PELVIS    CLINICAL HISTORY:  Aortic aneurysm (AAA), post-op;    TECHNIQUE:  Helically acquired images were obtained from the lung bases to the pubic symphysis prior to and after IV administration of contrast. Axial, sagittal, coronal and MIP reformations were interpreted.    Automated tube current modulation, weight-based exposure dosing, and/or iterative reconstruction technique  utilized to reach lowest reasonably achievable exposure rate.    DLP: 1331 mGy*cm    COMPARISON:  CTA 11/22/2023    FINDINGS:  VASCULATURE:    Aorta: There are postsurgical changes of recent open abdominal aortic aneurysm graft repair with aorto bi-iliac graft.  The graft is patent and normal in caliber.  No appreciable arterial leak.  No delayed phase imaging obtained.  The excluded aneurysm sac demonstrates expected postoperative disruption and fluid and air within the aneurysm sac.  No arterial enhancement seen within the aneurysm sac.    Mesenteric arteries: Celiac and superior mesenteric arteries are patent.  CHEYANNE not seen.    Renal arteries:No significant stenosis.    Iliac arteries: Patent iliac grafts.  Patent runoff to the groin bilaterally.  There is atherosclerotic disease at the left external iliac artery with moderate stenosis.  This is new from previous exam.  Question component of vaso spasm, mural hematoma or dissection flap.  Possible short segment non propagating flap at the proximal left internal iliac artery.    HEART: There are coronary artery calcifications.    LUNG BASES: Emphysema.  Trace basilar atelectasis.    LIVER: Normal arterial phase enhancement of the liver.    BILIARY: No calcified gallstones.    PANCREAS: No inflammatory change.    SPLEEN: Normal in size    ADRENALS: No mass.    KIDNEYS/URETERS: Subtle patchy cortical hypoenhancement at the right kidney.  No hydronephrosis.    GI TRACT/MESENTERY: Enteric tube terminates in the stomach.  Bowel is normal in caliber without evidence of obstruction.  Moderate colonic stool burden.     Hyperdensity in the lower abdomen/upper pelvis anteriorly measures 4.3 cm (14, 156).  This could be a small hematoma or clustered, collapsed bowel loops.    PERITONEUM AND RETROPERITONEUM: Moderate free intraperitoneal fluid, borderline hyperdense.Expected postoperative free intraperitoneal air, small to moderate.    LYMPH NODES: No enlarged lymph nodes by  size criteria.    BLADDER: Collapsed about a Flores catheter.    REPRODUCTIVE ORGANS: Prostate indents upon the base of the urinary bladder.    SOFT TISSUES: Body wall edema.  Expected postoperative gas along the ventral incision.    BONES: Chronic partial fusion at L3-L4.  Impression: 1. Postsurgical changes of recent open abdominal aortic aneurysm aorto bi-iliac graft repair.  The graft is patent and normal caliber.  No appreciable arterial leak.  2. Expected postoperative gas at the excluded, opened aneurysm sac and within the abdomen.  3. Moderate free intraperitoneal fluid, possibly hemoperitoneum  4. Subtle patchy cortical hypoenhancement at the right kidney likely related to altered perfusion.  Changes are very subtle.  The right kidney does enhance in the right renal artery is patent.  5. Indeterminate rounded hyperdensity at the anterior lower abdomen, possibly small hematoma or clustered, collapsed small bowel loops  6. Atherosclerotic disease at the left external iliac artery with moderate stenosis.  Stenosis is new from previous exam.  Question component of vaso spasm/vaso constriction, mural hematoma or dissection flap.  There is patent runoff to the groin.  Discrepancy from preliminary report.    Electronically signed by: Jackelyn Gant  Date:    01/12/2024  Time:    08:17      Rylie Rose DO  Department of Hospital Medicine  [facility]  01/18/2024

## 2024-01-18 NOTE — PT/OT/SLP PROGRESS
Physical Therapy Treatment    Patient Name:  Red Aaron Jr.   MRN:  [id number]    Recommendations:     Discharge therapy intensity: Low Intensity Therapy   Discharge Equipment Recommendations: walker, rolling  Barriers to discharge: Ongoing medical needs    Assessment:     Red Aaron Jr. is a 68 y.o. male admitted with a medical diagnosis of Infrarenal abdominal aortic aneurysm (AAA) without rupture.  He presents with the following impairments/functional limitations: weakness, impaired endurance, impaired functional mobility, gait instability, impaired balance, impaired cognition, decreased safety awareness. Pt tolerated session well, significant improvements in activity tolerance and endurance, ambulating household distances with RW SBA. Pt ambulated 220'/220' with seated rest breaks in between bouts due to fatigue. Decreasing POC to 3x/wk until pt is fully I without rest breaks for fatigue.     Rehab Prognosis: Good; patient would benefit from acute skilled PT services to address these deficits and reach maximum level of function.    Recent Surgery: Procedure(s) (LRB):  CREATION, BYPASS, ARTERIAL, AORTA TO FEMORAL, BILATERAL (N/A) 7 Days Post-Op    Plan:     During this hospitalization, patient to be seen 3 x/week to address the identified rehab impairments via gait training, therapeutic activities, therapeutic exercises, neuromuscular re-education and progress toward the following goals:    Plan of Care Expires:  02/14/24    Subjective     Chief Complaint: frequent BM's  Patient/Family Comments/goals: to return to PLOF  Pain/Comfort:   0/10      Objective:     Communicated with RN prior to session.  Patient found up in chair with peripheral IV, wound vac, telemetry upon PT entry to room.     General Precautions: Standard, fall  Orthopedic Precautions: N/A  Braces: N/A  Respiratory Status: Room air  Skin Integrity:  wound vac to incision near groin      Functional Mobility:  Transfers:     Sit to Stand:   stand by assistance with rolling walker  Gait: Pt ambulated 220'/220' with RW SBA-CGA with seated rest break in between bouts due to fatigue/SOB.       Education:  Patient provided with verbal education education regarding PT role/goals/POC, fall prevention, and safety awareness.  Understanding was verbalized.     Patient left up in chair with all lines intact and call button in reach..    GOALS:   Multidisciplinary Problems       Physical Therapy Goals          Problem: Physical Therapy    Goal Priority Disciplines Outcome Goal Variances Interventions   Physical Therapy Goal     PT, PT/OT Ongoing, Progressing     Description: Goals to be met by: 24     Patient will increase functional independence with mobility by performin. Supine to sit with [place]  2. Sit to stand transfer with Modified [place]  3. Bed to chair transfer with Modified [place] using Rolling Walker  4. Gait  x 150 feet with Modified [place] using Rolling Walker.   5. Ascend/descend 10 stair with right Handrails Contact Guard Assistance using No Assistive Device.                          Time Tracking:     PT Received On: 24  PT Start Time: 1400     PT Stop Time: 1423  PT Total Time (min): 23 min     Billable Minutes: Therapeutic Activity 23    Treatment Type: Treatment  PT/PTA: PT     Number of PTA visits since last PT visit: 3     2024

## 2024-01-19 NOTE — PLAN OF CARE
01/19/24 1129   Final Note   Assessment Type Final Discharge Note   Anticipated Discharge Disposition Home-Health   Post-Acute Status   Post-Acute Authorization Home Health     [facility] Spoke to Elisa. [facility] walker is ready to be picked up.

## 2024-01-19 NOTE — DISCHARGE INSTRUCTIONS
1-please follow up with your primary care physician  2-please follow up with dr selby as scheduled  3-please follow up with home health- lisa

## 2024-01-22 NOTE — PROGRESS NOTES
C3 nurse spoke with Red Aaron Jr.'s Caregiver, Hayley for a TCC post hospital discharge follow up call. The patient has a scheduled HOSFU appointment with Rip Ansari MD (Family Medicine) on Thursday Jan 25, 2024 10:40 AM and Royce Gatica MD (Vascular Surgery) on 2/2/2024; @10:15 AM.

## 2024-01-25 NOTE — PROGRESS NOTES
[facility] FM Clinic Progress Note    ID:  Red Aaron Jr.   MRN:  [id number]     1/25/2024    Chief Complaint:  Hospital follow up    History of Present Illness:  Red Aaron Jr. is a 68 y.o. male who presents to [facility] clinic for hospital follow-up following open abdominal aortic aneurysm repair, discharged 01/19/2024.  He reports overall he feels well since hospital discharge, wound is well-appearing, no drainage, mild incisional pain for which she has been taking Tylenol but was prescribed some Norco 7.5 by vascular surgery doctors and will follow up with them on 02/05/2024.  Denies any chest pain, dyspnea on exertion, lower extremity weakness, numbness and tingling bilateral upper lower extremities or skin color changes.     Acute Issues:  Denies    Chronic Issues Addressed today:    HTN- med compliant with lisinopril 5 and metoprolol 50 mg b.i.d. blood pressure is very well controlled today        HM (per pt)  Colorectal cancer screening 2019, Dr. Brenda sethi gastro. ?10 year follow up  PSA- wnl 7/2023   Lung Cx screening  AAA- see above  Immunizations- Shingles, Prevnar, RSV, COVID booster due      Health Maintenance   Topic Date Due    Colorectal Cancer Screening  Never done    LDCT Lung Screen  Never done    Shingles Vaccine (1 of 2) Never done    TETANUS VACCINE  07/21/2021    High Dose Statin  01/22/2025    Lipid Panel  10/17/2028    Hepatitis C Screening  Completed    Abdominal Aortic Aneurysm Screening  Completed       Past Medical History:   Diagnosis Date    Abdominal pain     Digestive disorder     acid indigestion    Hyperlipidemia     Hypertension     Infrarenal abdominal aortic aneurysm (AAA) without rupture     PTSD (post-traumatic stress disorder)     Schizophrenia     Seizures     last seizure 8 years ago       Past Surgical History:   Procedure Laterality Date    COLONOSCOPY      CREATION OF BILATERAL AORTOFEMORAL ARTERIAL BYPASS N/A 1/11/2024    Procedure: CREATION, BYPASS, ARTERIAL, AORTA  TO FEMORAL, BILATERAL;  Surgeon: Royce Gatica MD;  Location: [facility] OR;  Service: Peripheral Vascular;  Laterality: N/A;    TONSILLECTOMY         Social History     Tobacco Use    Smoking status: Every Day     Current packs/day: 0.50     Average packs/day: 0.5 packs/day for 51.1 years (25.5 ttl pk-yrs)     Types: Cigarettes     Start date: 1973    Smokeless tobacco: Former     Types: Chew   Substance Use Topics    Alcohol use: Not Currently     Alcohol/week: 1.0 standard drink of alcohol     Types: 1 Glasses of wine per week    Drug use: Not Currently     Frequency: 2.0 times per week     Types: Marijuana       No family history on file.      Current Outpatient Medications:     HYDROcodone-acetaminophen (NORCO) 7.5-325 mg per tablet, Take 1 tablet by mouth every 4 (four) hours as needed for Pain., Disp: , Rfl:     aspirin 81 MG Chew, Take 81 mg by mouth once daily., Disp: , Rfl:     benztropine (COGENTIN) 1 MG tablet, Take 0.5 mg by mouth 2 (two) times a day., Disp: , Rfl:     gabapentin (NEURONTIN) 300 MG capsule, Take 1 capsule (300 mg total) by mouth nightly., Disp: 90 capsule, Rfl: 1    haloperidol decanoate (HALDOL DECANOATE) 100 mg/mL injection, Inject 1.5 mLs into the muscle every 21 days., Disp: , Rfl:     lisinopriL (PRINIVIL,ZESTRIL) 5 MG Tab, Take 1 tablet (5 mg total) by mouth once daily., Disp: 90 tablet, Rfl: 1    loratadine (CLARITIN) 10 mg tablet, Take 10 mg by mouth once daily., Disp: , Rfl:     metoprolol succinate (TOPROL-XL) 50 MG 24 hr tablet, Take 1 tablet (50 mg total) by mouth once daily., Disp: 90 tablet, Rfl: 1    multivitamin (ONE DAILY MULTIVITAMIN) per tablet, Take 1 tablet by mouth once daily., Disp: , Rfl:     OLANZapine (ZYPREXA) 10 MG tablet, Take 1 tablet (10 mg total) by mouth every evening., Disp: 30 tablet, Rfl: 11    rosuvastatin (CRESTOR) 20 MG tablet, Take 1 tablet (20 mg total) by mouth once daily., Disp: 90 tablet, Rfl: 1    Review of patient's allergies  "indicates:  No Known Allergies      Review of Systems:  See HPI      Physical Exam:  /65 (BP Location: Right arm, Patient Position: Sitting, BP Method: Medium (Automatic))   Pulse 104   Temp 98.7 °F (37.1 °C) (Oral)   Ht 5' 7" (1.702 m)   Wt 54.6 kg (120 lb 6.4 oz)   BMI 18.86 kg/m²     Gen- appears comfortable  HEENT- no conjunctival pallor  CV- regular rate and rhythm no murmurs rubs or gallops  Palpable radial and DP pulses bilaterally  Resp- lungs CTA bilaterally  GI- abdomen is soft, nontender, nondistended. Pulsatile abdomen, AA roughly 6 cm, no bruit  - no suprapubic tenderness  Neuro- alert and responding appropriately to questions commands  Skin- Abdominal midline incision is well healed without erythema or drainage      Assessment/Plan:    History of abdominal aortic aneurysm (AAA)  - S/P repair  - Incision is well appearing  - Keep scheduled follow up with Vascular surgery 2/5/24    Hypertension, unspecified type  Blood pressure well controlled   -continue metoprolol succinate 50 mg daily, lisinopril 5 mg daily    Hx of tobacco use   - Discussed LDCT, informed denial    Immunization Due  - Prevnar 20 given    Rip Ansari MD  [employer]  Resident HO3    "

## 2024-01-25 NOTE — PHYSICIAN QUERY
PT Name: Red Aaron Jr.  MR #: [id number]  DOCUMENTATION CLARIFICATION     CDS/: Vanessa Keith RN CDIS            Contact information: [email or web address]  This form is a permanent document in the medical record.    Query Date: January 25, 2024    By submitting this query, we are merely seeking further clarification of documentation. Please utilize your independent clinical judgment when addressing the question(s) below.    The Medical Record contains the following:  Indicators Supporting Clinical Findings Location in Medical Record   x Surgery or Procedure performed Procedure performed:  Open AAA Repair  Op note 1/11    x Anesthesia type Anesthesia Type:  general  Op note 1/11    x Acute/Chronic Illness Infrarenal abdominal aortic aneurysm (AAA) without rupture  Op note 1/11    x Respiratory failure documented Acute respiratory failure postoperative requiring ongoing mechanical ventilation  Pulm note 1/12     Mechanical Ventilation      Difficulty weaning or prolonged weaning documented      Reintubation documented     x BiPAP, CPAP, or Oxygen administration post-extubation On 2L NC,  CCS note 1/13     Treatments     x Other Good oxygenation and ventilation on current mechanical ventilatory settings, patient now awake and alert  Begin spontaneous breathing trial, hopefully to extubate this a.m.    This morning the patient has been extubated.    Pulm note 1/12             Cards note 1/12        [facility] approved diagnostic criteria for Postprocedural Respiratory Failure:  Acute pulmonary dysfunction requiring non-routine aggressive measures  or  Required postprocedural support that was unexpected or unusual for the procedure performed  or  Required postprocedural care beyond routine care  and  Respiratory Failure that is due to surgery/procedure     The clinical guidelines noted above are only a system guideline. It does not replace the providers clinical judgment.    Please clarify if the  Acute  respiratory failure   is    [   ] No Respiratory Failure, Maintained on Vent for Routine Care -  purposely intubated/left intubated after surgery without meeting the criteria for respiratory failure                                [   ] Due to condition other than surgery - Unexpected or unusual for that procedure but a pre-existing medical condition is present and is contributing. Please specify condition: _________  Common Examples: COPD, CHF, a neuromuscular disorder or degenerative neurological disorder   [   ] Complication of surgery or procedure   [   ] Complication of anesthesia   [  x ] Other explanation with details (please specify): _hospitalist services were not attending at the time of this event.query should go to attending _____________________                                         Please document in your progress notes daily for the duration of treatment, until resolved, and include in your discharge summary.    Reference:  ICD-10-CM Official Guidelines for Coding and Reporting FY 2021. (2020). Retrieved October 21, 2020, from https://www.cdc.gov/nchs/data/icd/10cmguidelines-FY2021.pdf?[email or web address]      Form No. [id number]

## 2024-02-02 NOTE — PHYSICIAN QUERY
PT Name: Red Aaron Jr.  MR #: [id number]  DOCUMENTATION CLARIFICATION     CDS/: Vanessa Keith RN CDIS            Contact information: [email or web address]  This form is a permanent document in the medical record.    Query Date: February 2, 2024    By submitting this query, we are merely seeking further clarification of documentation. Please utilize your independent clinical judgment when addressing the question(s) below.    The Medical Record contains the following:  Indicators Supporting Clinical Findings Location in Medical Record   x Surgery or Procedure performed Procedure performed:  Open AAA Repair  Op note 1/11    x Anesthesia type Anesthesia Type:  general  Op note 1/11    x Acute/Chronic Illness Infrarenal abdominal aortic aneurysm (AAA) without rupture  Op note 1/11    x Respiratory failure documented Acute respiratory failure postoperative requiring ongoing mechanical ventilation  Pulm note 1/12     Mechanical Ventilation      Difficulty weaning or prolonged weaning documented      Reintubation documented     x BiPAP, CPAP, or Oxygen administration post-extubation On 2L NC,  CCS note 1/13     Treatments     x Other Good oxygenation and ventilation on current mechanical ventilatory settings, patient now awake and alert  Begin spontaneous breathing trial, hopefully to extubate this a.m.    This morning the patient has been extubated.    Pulm note 1/12             Cards note 1/12        [facility] approved diagnostic criteria for Postprocedural Respiratory Failure:  Acute pulmonary dysfunction requiring non-routine aggressive measures  or  Required postprocedural support that was unexpected or unusual for the procedure performed  or  Required postprocedural care beyond routine care  and  Respiratory Failure that is due to surgery/procedure     The clinical guidelines noted above are only a system guideline. It does not replace the providers clinical judgment.    Please clarify if the  Acute  respiratory failure   is    [   ] No Respiratory Failure, Maintained on Vent for Routine Care -  purposely intubated/left intubated after surgery without meeting the criteria for respiratory failure                                [   ] Due to condition other than surgery - Unexpected or unusual for that procedure but a pre-existing medical condition is present and is contributing. Please specify condition: _________  Common Examples: COPD, CHF, a neuromuscular disorder or degenerative neurological disorder   [   ] Complication of surgery or procedure   [   ] Complication of anesthesia   [x] Other explanation with details (please specify): _ transient respiratory failure due to prolonged effects of anesthesia and postoperative state _____________________                                         Please document in your progress notes daily for the duration of treatment, until resolved, and include in your discharge summary.    Reference:  ICD-10-CM Official Guidelines for Coding and Reporting FY 2021. (2020). Retrieved October 21, 2020, from https://www.cdc.gov/nchs/data/icd/10cmguidelines-FY2021.pdf?fbclid=[email or web address]      Form No. [id number]

## 2024-02-12 NOTE — PROGRESS NOTES
Faculty addendum: Patient discussed with resident. Chart was reviewed including vitals, labs, etc. Care provided reasonable and necessary. I participated in the management of the patient and was immediately available throughout the encounter. Services were furnished in a primary care center located in the outpatient department of a [facility] hospital. I agree with the resident's findings and plan as documented in the resident's note.

## 2024-02-13 PROBLEM — I46.9 CARDIAC ARREST: Status: ACTIVE | Noted: 2024-01-01

## 2024-02-13 NOTE — ED PROVIDER NOTES
Encounter Date: 2/13/2024    SCRIBE #1 NOTE: I, Gagan Silverman, am scribing for, and in the presence of,  Micheal Lorenzo MD. I have scribed the following portions of the note - Other sections scribed: HPI, ROS, PE.       History     Chief Complaint   Patient presents with    Cardiac Arrest     Pt arrives via [facility] as post cardiac arrest. Per EMS pt was unresponsive x 15 minutes prior to their arrival. EMS performed 25 minutes of CPR, gave epi x3. + ROSC @ approx 1655. GCS 3. BVM in progess.      69 y/o male with a hx of HLD, HTN, AAA, schizophrenia, and seizures presents to the ED via EMS for cardiac arrest. EMS reports pt was unresponsive for 15 minutes prior to their arrival. EMS notes pt coded for around 20-25 minutes en route and CPR was performed, no shocks. EMS notes pulse regained at 1643. EMS administered 3 epi, 1 mg each and 500 ml bag of saline. Wife states pt had been complaining of generalized weakness throughout the day.     ROS limited due to pt coding at 1718 in the ED. Pulse regained at 1728    The history is provided by the spouse and the EMS personnel. The history is limited by the condition of the patient. No  was used.     Review of patient's allergies indicates:  No Known Allergies  Past Medical History:   Diagnosis Date    Abdominal pain     Digestive disorder     acid indigestion    Hyperlipidemia     Hypertension     Infrarenal abdominal aortic aneurysm (AAA) without rupture     PTSD (post-traumatic stress disorder)     Schizophrenia     Seizures     last seizure 8 years ago     Past Surgical History:   Procedure Laterality Date    COLONOSCOPY      CREATION OF BILATERAL AORTOFEMORAL ARTERIAL BYPASS N/A 1/11/2024    Procedure: CREATION, BYPASS, ARTERIAL, AORTA TO FEMORAL, BILATERAL;  Surgeon: Royce Gatica MD;  Location: [facility];  Service: Peripheral Vascular;  Laterality: N/A;    TONSILLECTOMY       No family history on file.  Social History     Tobacco Use     Smoking status: Every Day     Current packs/day: 0.50     Average packs/day: 0.5 packs/day for 51.1 years (25.6 ttl pk-yrs)     Types: Cigarettes     Start date: 1973    Smokeless tobacco: Former     Types: Chew   Substance Use Topics    Alcohol use: Not Currently     Alcohol/week: 1.0 standard drink of alcohol     Types: 1 Glasses of wine per week    Drug use: Not Currently     Frequency: 2.0 times per week     Types: Marijuana     Review of Systems   Unable to perform ROS: Patient unresponsive       Physical Exam     Initial Vitals   BP Pulse Resp Temp SpO2   02/13/24 1842 02/13/24 1719 02/13/24 1719 -- 02/13/24 1719   (!) 41/23 (!) 131 (!) 24  (!) 85 %      MAP       --                Physical Exam    Constitutional:   Appears thin   HENT:   Head: Normocephalic and atraumatic.   Right Ear: External ear normal.   Left Ear: External ear normal.   Nose: Nose normal.   Edentulous   Eyes: EOM are normal. Pupils are equal, round, and reactive to light. Right eye exhibits no discharge. Left eye exhibits no discharge.   Cardiovascular:      Exam reveals no gallop and no friction rub.       No murmur heard.  Pulmonary/Chest: Effort normal and breath sounds normal. No respiratory distress. He has no wheezes. He has no rhonchi. He has no rales. He exhibits no tenderness.   Abdominal: Abdomen is soft. Bowel sounds are normal. He exhibits no distension and no mass. There is no abdominal tenderness.   Well healed surgical scar on abdomen.  There is no rebound and no guarding.   Musculoskeletal:         General: No edema. Normal range of motion.     Neurological: He is alert and oriented to person, place, and time. No cranial nerve deficit or sensory deficit.   Skin: Skin is warm. Capillary refill takes less than 2 seconds.         ED Course   Intubation    Date/Time: 2/13/2024 5:21 PM  Location procedure was performed: [facility] EMERGENCY DEPARTMENT    Performed by: Michael Lorenzo MD  Authorized by: Michael Lorenzo MD   Consent Done: Emergent Situation  Indications: airway protection (cardiac arrest)  Intubation method: video-assisted  Patient status: unconscious  Preoxygenation: bag valve mask  Pretreatment medications: none  Paralytic: none  Laryngoscope size: Glide 3  Tube size: 8.0 mm  Tube type: cuffed  Number of attempts: 1  Cricoid pressure: no  Cords visualized: yes  Post-procedure assessment: CO2 detector  Breath sounds: equal and absent over the epigastrium  Cuff inflated: yes  ETT to lip: 22 cm  Tube secured with: ETT taveras  Chest x-ray interpreted by me.  Chest x-ray findings: endotracheal tube in appropriate position      Gastric Lavage    Date/Time: 2/13/2024 5:25 PM  Location procedure was performed: [facility] EMERGENCY DEPARTMENT    Authorized by: Michael Lorenzo MD    Performed by: Michael Lorenzo MDConsent: The procedure was performed in an emergent situation.  Risks and benefits: risks, benefits and alternatives were discussed  Patient identity confirmed: anonymous protocol, patient vented/unresponsive  Indications: gastric decompression    Sedation:  Patient sedated: no    Patient position: supine  Tube size: 16 Fr  Insertion location: mouth  Endoscope used: no  Aspirate appearance: gastric contents  Tube position confirmed: auscultation  Tube placement difficulty: minimal  Tube status: LIWS      Critical Care    Date/Time: 2/13/2024 7:38 PM    Performed by: Michael Lorenzo MD  Authorized by: Michael Lorenzo MD  Direct patient critical care time: 22 minutes  Additional history critical care time: 9 minutes  Ordering / reviewing critical care time: 9 minutes  Documentation critical care time: 10 minutes  Consult with family critical care time: 13 minutes  Total critical care time (exclusive of procedural time) : 63 minutes  Critical care time was exclusive of separately billable procedures and treating other patients.  Critical care was necessary to treat or prevent imminent or life-threatening  deterioration of the following conditions: cardiac failure and shock.  Critical care was time spent personally by me on the following activities: development of treatment plan with patient or surrogate, gastric intubation, interpretation of cardiac output measurements, evaluation of patient's response to treatment, examination of patient, obtaining history from patient or surrogate, ordering and performing treatments and interventions, ordering and review of laboratory studies, ordering and review of radiographic studies, pulse oximetry, re-evaluation of patient's condition, review of old charts and ventilator management.        Labs Reviewed   BLOOD CULTURE OLG   BLOOD CULTURE [facility]   CBC W/ AUTO DIFFERENTIAL    Narrative:     The following orders were created for panel order CBC auto differential.  Procedure                               Abnormality         Status                     ---------                               -----------         ------                     CBC with Differential[[id number]]                                                        Please view results for these tests on the individual orders.   COMPREHENSIVE METABOLIC PANEL   COVID/FLU A&B PCR   MAGNESIUM   PROTIME-INR   APTT   TROPONIN I   TSH   URINALYSIS, REFLEX TO URINE CULTURE   CBC WITH DIFFERENTIAL   COVID/RSV/FLU A&B PCR          Imaging Results              US Abdominal Aorta (In process)                      X-Ray Chest AP Portable (Final result)  Result time 02/13/24 18:13:25      Final result by Jurgen Valderrama MD (02/13/24 18:13:25)                   Impression:      Pulmonary edema.      Electronically signed by: Jurgen Valderrama  Date:    02/13/2024  Time:    18:13               Narrative:    EXAMINATION:  XR CHEST AP PORTABLE    CLINICAL HISTORY:  intubation, s/p code;    TECHNIQUE:  One view    COMPARISON:  January 12, 2024.    FINDINGS:  Cardiopericardial silhouette is within normal limits.  Optimal intubation and the  nasogastric tube traverses into the gastric fundus.  Lungs are remarkable for moderate congestive ground-glass and reticulonodular opacities.  No significant fluid within the pleural space.  No pneumothorax.                                       Medications   NORepinephrine 8 mg (LEVOPHED) 8 mg/250 mL (32 mcg/mL) in dextrose 5% 250 mL infusion (has no administration in time range)   sodium chloride 0.9% bolus 1,000 mL 1,000 mL (has no administration in time range)   EPINEPHrine (ADRENALIN) 5 mg in dextrose 5 % (D5W) 250 mL infusion (has no administration in time range)   EPINEPHrine (ADRENALIN) 0.1 mg/mL injection (has no administration in time range)   vasopressin (PITRESSIN) 0.2 Units/mL in dextrose 5 % (D5W) 100 mL infusion (has no administration in time range)   vasopressin (PITRESSIN) 20 unit/mL injection (has no administration in time range)   piperacillin-tazobactam (ZOSYN) 4.5 g in dextrose 5 % in water (D5W) 100 mL IVPB (MB+) (has no administration in time range)   EPINEPHrine (PF) (ADRENALIN) 1 mg/mL (1 mL) injection (has no administration in time range)   EPINEPHrine 0.1 mg/mL injection (1 mg Intravenous Given 2/13/24 1821)   calcium chloride 100 mg/mL (10 %) injection (1 g Intravenous Given 2/13/24 1726)   sodium bicarbonate 8.4 % (1 mEq/mL) injection (50 mEq Intravenous Given 2/13/24 1744)   sodium chloride 0.9% bolus (1,000 mLs Intravenous New Bag 2/13/24 1805)     Medical Decision Making  Differential diagnosis (includes but is not limited to): Patient presents with CPR in progress. Differential includes ventricular tachycardia, ventricular fibrillation, asystole, PEA, pneumothorax, acute coronary syndrome, hypovolemia, hypoxia, cardiac tamponade, overdose, poisoning, acidosis, hemorrhage, hypokalemia, hyperkalemia, pulmonary embolus, hypothermia, hypoglycemia, AAA leak/rupture    Patient arrives with Sj tube in place.  Intubated emergently upon arrival.  Shortly after arrival loses pulses again.  ACS started. Patient was received multiple rounds of epinephrine, bicarb, calcium.  We will regain and lose pulses multiple times here in the emergency department.  Had a very long discussion with family who ultimately decided to make patient DNR.  After decisions made he does not lose pulses again.  Does remain profoundly bradycardic, hypotensive despite epinephrine, norepinephrine and vasopressin.  Started empirically on antibiotics in case he was septic.  Bedside ultrasound of his aorta is not reveal any obvious rupture or leakage of his recent AAA repair.  His abdomen is soft.  He has no free fluid in his abdomen.  Remains in very critical condition.  Spoke with at length with sister, daisha, niece.  Advised him patient was critical condition.  Do not believe he will survive this admission.    Pt pronounced dead at 1949.        Amount and/or Complexity of Data Reviewed  Independent Historian: spouse and EMS     Details: Wife states pt had been complaining of generalized weakness throughout the day.    EMS reports pt was unresponsive for 15 minutes prior to their arrival. EMS notes pt coded for around 20-25 minutes en route and CPR was performed, no shocks. EMS notes pulse regained at 1643. EMS administered 3 epi, 1 mg each and 500 ml bag of saline.   Labs: ordered.  Radiology: ordered.  ECG/medicine tests: ordered.            Scribe Attestation:   Scribe #1: I performed the above scribed service and the documentation accurately describes the services I performed. I attest to the accuracy of the note.    Attending Attestation:           Physician Attestation for Scribe:  Physician Attestation Statement for Scribe #1: I, Michael Lorenzo MD, reviewed documentation, as scribed by Gagan Silverman in my presence, and it is both accurate and complete.             ED Course as of 02/13/24 1953 Tue Feb 13, 2024   7389 Chart review reveals patient was discharged from hospital 1/19 after abdominal aortic aneurysm  repair.  Evidently doing well since discharge per note from PCP on 01/25/2024. [MM]   1735 EKG from 1725 reveals sinus tachycardic possibly rate of 125.  Very difficult to interpret.  Significant motion artifact in most leads except for lead 2 and V6. [MM]   1735 Repeat EKG from 17 30 shows sinus tachycardia rate of 113.  .  Right bundle-branch block morphology.  Inverted T-waves in V3 V2.  No obvious ST elevation or depression. [MM]   1739 When compared to prior EKG from 01/10/2024.  Bradycardia.  Right bundle-branch block appears to be new [MM]   1740 Paged [facility] [LH]   1740 Paged ICU [LH]   1805 Spoke with ICU resident.  Will see and evaluate patient [MM]   1826 I had a long talk with the daisha he was that he was she maker, the sister and the niece.  Inform them the patient was grim prognosis.  It sounds as if he was feeling weak all day.  Too weak to ambulate to the bathroom.  Then he had what sounds possibly like some seizure-like activity but did not lose consciousness.  She reports that at 1 point he attempted to go to the bathroom then laid down and his eyes rolled back.  She states that he was in the state for proximally 10-15 minutes then EMS arrived and began CPR.  Unsure when he lost pulses.  Possibly down for 10-15 minutes with no bystander CPR.  He was lost his pulse 3 to 4 times here in the emergency department.  Significantly hypotensive currently on 4 pressors.  Critically ill.  I have ordered a stat ultrasound of the abdomen with concern for possible leak from aorta. [MM]   1830 Wet read on bedside ultrasound of aorta does not reveal any obvious, acute leak or process.  Spoke once again with sister and daisha in room.  They would like to make patient DNR should he code again..  Continue current management. [MM]   1933 X-Ray Chest AP Portable  Congestive changes bilaterally [MM]   1950 Was at 5:49 p.m. called to patient's room.  Agonal rhythm on monitor.  No palpable pulse.  No spontaneous  respirations.  No spontaneous cardiac activity.  Pupils fixed unreactive.  No corneal reflex.  Patient pronounced  at 7:49 p.m. [MM]      ED Course User Index  [LH] Gagan Silverman  [MM] Michael Lorenzo MD                           Clinical Impression:  Final diagnoses:  [I46.9] Cardiac arrest (Primary)  [I71.40] AAA (abdominal aortic aneurysm)          ED Disposition Condition                     Michael Lorenzo MD  24

## 2024-02-14 LAB
OHS QRS DURATION: 108 MS
OHS QRS DURATION: 108 MS
OHS QTC CALCULATION: 479 MS
OHS QTC CALCULATION: 488 MS

## 2024-02-14 NOTE — ED NOTES
Donn QUINONEZ House Sup notified of death and transport to [facility].

## 2025-01-31 NOTE — DISCHARGE SUMMARY
Requested Prescriptions:   Requested Prescriptions     Pending Prescriptions Disp Refills    glimepiride (AMARYL) 2 MG tablet 90 tablet 1     Sig: Take 1 tablet by mouth Daily With Dinner.        Pharmacy where request should be sent: [facility] [id number] [place] [id number] [facility] [address] - [phone]  - [phone]     Last office visit with prescribing clinician: 5/9/2024   Last telemedicine visit with prescribing clinician: Visit date not found   Next office visit with prescribing clinician: Visit date not found     Additional details provided by patient: Faxed from [facility]    Does the patient have less than a 3 day supply:  [] Yes  [] No    Would you like a call back once the refill request has been completed: [] Yes [] No    If the office needs to give you a call back, can they leave a voicemail: [] Yes [] No    Jus Mosher Rep   01/31/25 08:06 EST            [facility] Medicine Discharge Summary    Admit Date: 1/11/2024  Discharge Date and Time: 1/19/24  Admitting Physician:  Team  Discharging Physician: Murray Shoemaker MD.  Primary Care Physician: Rip Ansari MD  Consults: Cardiology, Psychiatry,Pulmonary/Intensive care/vascular    Discharge Diagnoses:  S/p infrarenal AAA repair  Hypoxic respiratory failure status post extubation now on nasal cannula  Persistent sinus tachycardia   Hypersomnolence  History of schizophrenia  Acute diarrhea x one   history of essential hypertension, HLD, AAA, seizure disorder, schizophrenia, and PTSD         Hospital Course:   68 y.o. male with a past medical history of essential hypertension, HLD, AAA, seizure disorder, schizophrenia, and PTSD presented to [facility] on 1/11/2024 for open repair of infrarenal AAA by Dr. Gatica.  Patient received 2 units packed red blood cells transfusion intraoperatively.  Patient remained intubated postoperatively and transferred to ICU for close monitoring.  Patient was started on Levophed for labile blood pressures.  On 01/12 patient began having blood drainage from abdominal suture site.  Two additional stitches were placed by ICU.  CTA revealed no active bleeding.  Wound VAC prevena was placed to lower portion of abdomen to help with fluid drainage. Patient was also given IV Lasix 40 mg with good response.  Patient became tachycardic and hypertensive requiring Cleviprex. Patient was extubated on 01/12/2024.  Cleviprex was discontinued on 01/12/2023.  Patient was cleared for downgrade out of ICU on 01/13/2024.  Hospital medicine was consulted for transition of care and further medical management.      Post operatively after transferring to the floors, patient remained hemodynamically stable with  tachycardia.  Home med Beta-blockers resumed.  Psychiatric was consulted for med adjustment due to history of schizophrenia.  Recommended to continue olanzapine 10  mg at bedtime and benztropine 0.5 mg p.o. b.i.d..  He will also get his Haldol injection on 01/26/2024   Sitting up in chair.  Wife in the room.  No acute overnight events however did have an large volume diarrhea this morning. All cx's negative.  Remains afebrile.  Denies any abdominal pain, nausea, or vomiting.       Patient will be discharged home with home health and will remain w prevena wound vac, will call vascular for plans concerning this upon discharge for home health to follow    Pt was seen and examined on the day of discharge  Vitals:  VITAL SIGNS: 24 HRS MIN & MAX LAST   No data recorded 98.5 °F (36.9 °C)   No data recorded 105/64   No data recorded  100   No data recorded 18   No data recorded 99 %       Physical Exam:  General: In no acute distress, afebrile  Chest:  Unlabored  Heart:  rrr  Abdomen: Soft, hypoactive bowel sounds/ abdominal incision covered with wound vac   MSK: Warm, no lower extremity edema  Neurologic:  Awake, alert, responding appropriate         Microbiology Results (last 7 days)       Procedure Component Value Units Date/Time    Stool Culture [[id number]]  (Normal) Collected: 01/17/24 1246    Order Status: Completed Specimen: Stool Updated: 01/19/24 1114     Stool Culture Negative for Salmonella, Shigella, Campylobacter, Vibrio, Aeromonas, Pleisiomonas,Yersinia, or Shiga Toxin 1 and 2.    Clostridium Diff Toxin, A & B, EIA [[id number]]  (Normal) Collected: 01/17/24 1239    Order Status: Completed Specimen: Stool Updated: 01/17/24 1411     Clostridium Difficile GDH Antigen Negative     Clostridium Difficile Toxin A/B Negative                    Medication List        CHANGE how you take these medications      OLANZapine 10 MG tablet  Commonly known as: ZyPREXA  Take 1 tablet (10 mg total) by mouth every evening.  What changed:   medication strength  when to take this            CONTINUE taking these medications      aspirin 81 MG Chew     benztropine 1 MG tablet  Commonly known  as: COGENTIN     gabapentin 300 MG capsule  Commonly known as: NEURONTIN  Take 1 capsule (300 mg total) by mouth nightly.     haloperidol decanoate 100 mg/mL injection  Commonly known as: HALDOL DECANOATE     INV lisinopril 5 MG Tab  Commonly known as: PRINIVIL,ZESTRIL  Take 1 tablet (5 mg total) by mouth once daily.     loratadine 10 mg tablet  Commonly known as: CLARITIN     metoprolol succinate 50 MG 24 hr tablet  Commonly known as: TOPROL-XL  Take 1 tablet (50 mg total) by mouth once daily.     ONE DAILY MULTIVITAMIN per tablet  Generic drug: multivitamin     rosuvastatin 20 MG tablet  Commonly known as: CRESTOR  Take 1 tablet (20 mg total) by mouth once daily.               Where to Get Your Medications        These medications were sent to [facility] [place] [id number] [facility] [address]  [address], [facility] [address]      Phone: [phone]   OLANZapine 10 MG tablet          Explained in detail to the patient about the discharge plan, medications, and follow-up visits. Pt understands and agrees with the treatment plan  Discharge Disposition: home w home health    Discharged Condition: stable  Diet- cardiac    Medications Per DC med rec  Activities as tolerated   Follow-up Information       Royce Gatica MD Follow up on 2/2/2024.    Specialty: Vascular Surgery  Why: @10:15  Contact information:  [place]  [facility] [address] [phone]                           For further questions contact hospitalist office    Discharge time 33 minutes    For worsening symptoms, chest pain, shortness of breath, increased abdominal pain, high grade fever, stroke or stroke like symptoms, immediately go to the nearest Emergency Room or call 911 as soon as possible.      Murray Bishop M.D.

## (undated) DEVICE — SUT 3-0 12-18IN SILK

## (undated) DEVICE — CATH IV 20GAX1.25 JELCO

## (undated) DEVICE — BOWL STERILE LARGE 32OZ

## (undated) DEVICE — CLIP LIGATING HEMOCLP SMALL

## (undated) DEVICE — SUT SILK 3-0 STRANDS 30IN

## (undated) DEVICE — CORD BIPOLAR 12 FOOT

## (undated) DEVICE — SUT VICRYL 3-0 27 SH

## (undated) DEVICE — BAG MEDI-PLAST DECANTER C-FLOW

## (undated) DEVICE — HEMOSTAT SURGICEL FIBRLR 2X4IN

## (undated) DEVICE — CLOSURE SKIN STERI STRIP 1/2X4

## (undated) DEVICE — SPONGE LAP STRL 18X18IN

## (undated) DEVICE — BENZOIN TINCTURE 0.66ML

## (undated) DEVICE — TIP SUCTION YANKAUER

## (undated) DEVICE — SOL NORMAL USPCA 0.9%

## (undated) DEVICE — SUT PROLENE 6-0 BV-1 30IN

## (undated) DEVICE — COVER PROBE US 5.5X58L NON LTX

## (undated) DEVICE — TAPE UMBILICAL 1/8X36IN WHITE

## (undated) DEVICE — CLIP LIGATING MEDIUM

## (undated) DEVICE — SUT VICRYL 2-0 36 CT-1

## (undated) DEVICE — ELECTRODE BLD EXT 6.50 ST MDFD

## (undated) DEVICE — COVER TABLE HVY DTY 60X90IN

## (undated) DEVICE — SUT PROLENE BL 4-0 RB-1 36IN

## (undated) DEVICE — SUT SILK 0 STRANDS 30IN BLK

## (undated) DEVICE — GLOVE PROTEXIS LTX MICRO 8

## (undated) DEVICE — SUT MCRYL PLUS 4-0 PS2 27IN

## (undated) DEVICE — DRAPE INCISE IOBAN 2 23X23IN

## (undated) DEVICE — SUT PDS PLUS 1 TP1 96IN

## (undated) DEVICE — Device

## (undated) DEVICE — SUT VICRYL 1 OB 36 CTX

## (undated) DEVICE — KIT C.A.T.S. FAST START

## (undated) DEVICE — TRAY CATH FOL SIL URIMTR 16FR

## (undated) DEVICE — LOOP VESSEL BLUE MINI 2/CARD

## (undated) DEVICE — SUT PROLENE 5-0 36IN C-1

## (undated) DEVICE — GLOVE PROTEXIS PI SYN SURG 6.5

## (undated) DEVICE — SYR IRRIGATION BULB STER 60ML

## (undated) DEVICE — APPLICATOR CHLORAPREP ORN 26ML

## (undated) DEVICE — DRESSING TELFA + BARR 4X6IN

## (undated) DEVICE — SUT SILK SH 2-0 30IN MP BLK

## (undated) DEVICE — SUT PDS PLUS MONO 1 CT 36IN

## (undated) DEVICE — DRESSING TELFA + RECT 6X10IN

## (undated) DEVICE — DRAPE SLUSH WARMER 66X44IN

## (undated) DEVICE — ELECTRODE EXTENDED BLADE

## (undated) DEVICE — CATH UROLOGICAL 18FR RED

## (undated) DEVICE — GLOVE PROTEXIS BLUE LATEX 8

## (undated) DEVICE — SUT 2-0 12-18IN SILK

## (undated) DEVICE — GLOVE PROTEXIS BLUE LATEX 6.5

## (undated) DEVICE — SUT 2/0 30IN SILK BLK BRAI

## (undated) DEVICE — MARKER WRITESITE SKIN CHLRAPRP

## (undated) DEVICE — CAUTERY HIGH TEMP 2IN FLEXIBLE

## (undated) DEVICE — ELECTRODE BLADE INSULATED 1 IN

## (undated) DEVICE — LOOP STERION MINI RED 8X406MM

## (undated) DEVICE — ELECTRODE PATIENT RETURN DISP

## (undated) DEVICE — SUT SILK 2-0 STRANDS 30IN

## (undated) DEVICE — SET SUCTION ANTICOAG .25IN

## (undated) DEVICE — KIT SURGIFLO HEMOSTATIC MATRIX

## (undated) DEVICE — ELECTRODE UTAHLOOP EXT 10CM

## (undated) DEVICE — SUT PROLENE 3-0 SH DA 36 BL